# Patient Record
Sex: FEMALE | Race: WHITE | NOT HISPANIC OR LATINO | Employment: FULL TIME | URBAN - METROPOLITAN AREA
[De-identification: names, ages, dates, MRNs, and addresses within clinical notes are randomized per-mention and may not be internally consistent; named-entity substitution may affect disease eponyms.]

---

## 2019-01-09 ENCOUNTER — TELEPHONE (OUTPATIENT)
Dept: INTERNAL MEDICINE | Facility: CLINIC | Age: 39
End: 2019-01-09

## 2019-01-09 ENCOUNTER — CLINICAL SUPPORT (OUTPATIENT)
Dept: INTERNAL MEDICINE | Facility: CLINIC | Age: 39
End: 2019-01-09

## 2019-01-09 ENCOUNTER — OFFICE VISIT (OUTPATIENT)
Dept: INTERNAL MEDICINE | Facility: CLINIC | Age: 39
End: 2019-01-09
Payer: COMMERCIAL

## 2019-01-09 VITALS
HEART RATE: 75 BPM | WEIGHT: 261.44 LBS | SYSTOLIC BLOOD PRESSURE: 122 MMHG | TEMPERATURE: 98 F | HEIGHT: 72 IN | DIASTOLIC BLOOD PRESSURE: 76 MMHG | BODY MASS INDEX: 35.41 KG/M2

## 2019-01-09 DIAGNOSIS — Z00.00 ANNUAL PHYSICAL EXAM: Primary | ICD-10-CM

## 2019-01-09 DIAGNOSIS — E10.9 CONTROLLED DIABETES MELLITUS TYPE 1 WITHOUT COMPLICATIONS: ICD-10-CM

## 2019-01-09 DIAGNOSIS — Z00.00 ROUTINE GENERAL MEDICAL EXAMINATION AT A HEALTH CARE FACILITY: Primary | ICD-10-CM

## 2019-01-09 DIAGNOSIS — E03.8 OTHER SPECIFIED HYPOTHYROIDISM: ICD-10-CM

## 2019-01-09 LAB
ALBUMIN SERPL BCP-MCNC: 3.8 G/DL
ALP SERPL-CCNC: 72 U/L
ALT SERPL W/O P-5'-P-CCNC: 13 U/L
ANION GAP SERPL CALC-SCNC: 9 MMOL/L
AST SERPL-CCNC: 14 U/L
BILIRUB SERPL-MCNC: 0.6 MG/DL
BUN SERPL-MCNC: 11 MG/DL
CALCIUM SERPL-MCNC: 9.6 MG/DL
CHLORIDE SERPL-SCNC: 108 MMOL/L
CHOLEST SERPL-MCNC: 178 MG/DL
CHOLEST/HDLC SERPL: 2.4 {RATIO}
CO2 SERPL-SCNC: 25 MMOL/L
CREAT SERPL-MCNC: 0.8 MG/DL
ERYTHROCYTE [DISTWIDTH] IN BLOOD BY AUTOMATED COUNT: 12.9 %
EST. GFR  (AFRICAN AMERICAN): >60 ML/MIN/1.73 M^2
EST. GFR  (NON AFRICAN AMERICAN): >60 ML/MIN/1.73 M^2
ESTIMATED AVG GLUCOSE: 137 MG/DL
GLUCOSE SERPL-MCNC: 85 MG/DL
HBA1C MFR BLD HPLC: 6.4 %
HCT VFR BLD AUTO: 42 %
HDLC SERPL-MCNC: 75 MG/DL
HDLC SERPL: 42.1 %
HGB BLD-MCNC: 13.2 G/DL
LDLC SERPL CALC-MCNC: 94.2 MG/DL
MCH RBC QN AUTO: 27.8 PG
MCHC RBC AUTO-ENTMCNC: 31.4 G/DL
MCV RBC AUTO: 89 FL
NONHDLC SERPL-MCNC: 103 MG/DL
PLATELET # BLD AUTO: 211 K/UL
PMV BLD AUTO: 12.9 FL
POTASSIUM SERPL-SCNC: 3.8 MMOL/L
PROT SERPL-MCNC: 7.5 G/DL
RBC # BLD AUTO: 4.74 M/UL
SODIUM SERPL-SCNC: 142 MMOL/L
T4 FREE SERPL-MCNC: 1.48 NG/DL
TRIGL SERPL-MCNC: 44 MG/DL
TSH SERPL DL<=0.005 MIU/L-ACNC: 0.39 UIU/ML
WBC # BLD AUTO: 5.55 K/UL

## 2019-01-09 PROCEDURE — 99999 PR PBB SHADOW E&M-NEW PATIENT-LVL III: CPT | Mod: PBBFAC,,, | Performed by: INTERNAL MEDICINE

## 2019-01-09 PROCEDURE — 83036 HEMOGLOBIN GLYCOSYLATED A1C: CPT

## 2019-01-09 PROCEDURE — 80053 COMPREHEN METABOLIC PANEL: CPT

## 2019-01-09 PROCEDURE — 99999 PR PBB SHADOW E&M-NEW PATIENT-LVL III: ICD-10-PCS | Mod: PBBFAC,,, | Performed by: INTERNAL MEDICINE

## 2019-01-09 PROCEDURE — 84439 ASSAY OF FREE THYROXINE: CPT

## 2019-01-09 PROCEDURE — 99385 PREV VISIT NEW AGE 18-39: CPT | Mod: S$GLB,,, | Performed by: INTERNAL MEDICINE

## 2019-01-09 PROCEDURE — 85027 COMPLETE CBC AUTOMATED: CPT

## 2019-01-09 PROCEDURE — 84443 ASSAY THYROID STIM HORMONE: CPT

## 2019-01-09 PROCEDURE — 80061 LIPID PANEL: CPT

## 2019-01-09 PROCEDURE — 99385 PR PREVENTIVE VISIT,NEW,18-39: ICD-10-PCS | Mod: S$GLB,,, | Performed by: INTERNAL MEDICINE

## 2019-01-09 RX ORDER — LEVOTHYROXINE SODIUM 150 UG/1
TABLET ORAL
Qty: 90 TABLET | Refills: 3
Start: 2019-01-09 | End: 2019-01-17

## 2019-01-09 NOTE — PROGRESS NOTES
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 Just finished fellowship for epilepsy in [place]. Will be starting here at [facility].     Type 1 diabetes - dx in . Currently on insulin pump. Well controlled.   Current a1c 6.4.   Eye - 3 times during pregnancy so up to date - in April.   Foot exam - during pcP EXAMS.     Hypothyroidism - synthroid 150mcg daily (reports levothyroxine was taken off the market at a time but has been on brand name since then).   H/o goiter w/ removal in [facility].     Had Pap in August in [place] and normal.     Past Medical History:  Past Medical History:   Diagnosis Date    Diabetes mellitus type I     Hypothyroidism        Past Surgical History:  Past Surgical History:   Procedure Laterality Date     SECTION      x2       Allergies:  Review of patient's allergies indicates:  No Known Allergies    Home Medications:  Prior to Admission medications    Medication Sig Start Date End Date Taking? Authorizing Provider   ACCU-CHEK LONNIE PLUS Strp  13  Yes Historical Provider, MD   NOVOLOG 100 unit/mL injection  13  Yes Historical Provider, MD   SYNTHROID 150 mcg tablet  13  Yes Historical Provider, MD       Family History:  Family History   Problem Relation Age of Onset    No Known Problems Mother     Cancer Father 79        colon CA    No Known Problems Brother     No Known Problems Daughter     No Known Problems Son        Social History:  Social History     Tobacco Use    Smoking status: Never Smoker    Smokeless tobacco: Never Used   Substance Use Topics    Alcohol use: No     Frequency: Never    Drug use: No       Review of Systems:  Review of Systems Comprehensive review of systems otherwise negative. See history/subjective section for more details.    Health Maintainence:   Reviewed.      PHYSICAL EXAM     /76 (BP Location: Left arm, Patient Position: Sitting, BP Method: Large (Manual))   Pulse 75   Temp 97.7 °F (36.5 °C)   Ht 6' (1.829 m)   Wt 118.6 kg (261 lb 7.5 oz)   LMP 10/09/2017   BMI 35.46 kg/m²     GEN - A+OX4, NAD   HEENT - PERRL, EOMI, OP clear. MMM.   Neck - No thyromegaly or cervical LAD. No thyroid masses felt.  CV - RRR, no m/r   Chest - CTAB, no wheezing or rhonchi  Abd - S/NT/ND/+BS. Insulin pump in place.  Ext - 2+BDP and radial pulses. No LE edema.   Neuro - 5/5 BUE and BLE strength. Sensation to light touch intact throughout. 2+ DTRs. Normal gait.   MSK - No spinal tenderness to palpation. Normal gait.   Skin - No rash.    LABS     Previous labs reviewed.    ASSESSMENT/PLAN     Thao Ybarra is a 38 y.o. female with  Thao was seen today for [facility].    Diagnoses and all orders for this visit:    Annual physical exam - pt UTD on her vaccines and HM.    Controlled diabetes mellitus type 1 without complications - cont insulin pump. Will email Dr. Saini to see if we can facilitate endo appt.  -     Ambulatory Referral to Endocrinology    Other specified hypothyroidism  -     TSH; Future; Expected date: 01/09/2019      RTC in 12 months, sooner if needed and depending on labs.    Juana Lizama MD  Department of Internal Medicine - [facility]  10:32 AM

## 2019-01-09 NOTE — LETTER
2019    Thao Ybarra  [address] [address]             [facility] - Internal Medicine  [address]  [facility] [address]  Phone: [phone]  Fax: [phone] Dear Dr. Ybarra:    Thank you for allowing me to serve you and perform your Executive Health exam on 2019.  This letter will serve a brief summary of the history, physical findings, and laboratory/studies performed and recommendations at that time.    Reason for Visit: Executive Health Preventive Physical Examination    Past Medical History:   Diagnosis Date    Diabetes mellitus type I     Hypothyroidism        Past Surgical History:   Procedure Laterality Date     SECTION      x2       Family History   Problem Relation Age of Onset    No Known Problems Mother     Cancer Father 79        colon CA    No Known Problems Brother     No Known Problems Daughter     No Known Problems Son        Social History     Socioeconomic History    Marital status:      Spouse name: Not on file    Number of children: Not on file    Years of education: Not on file    Highest education level: Not on file   Social Needs    Financial resource strain: Not on file    Food insecurity - worry: Not on file    Food insecurity - inability: Not on file    Transportation needs - medical: Not on file    Transportation needs - non-medical: Not on file   Occupational History    Occupation: EPILEPSY MD   Tobacco Use    Smoking status: Never Smoker    Smokeless tobacco: Never Used   Substance and Sexual Activity    Alcohol use: No     Frequency: Never    Drug use: No    Sexual activity: Not on file   Other Topics Concern    Not on file   Social History Narrative    Currently breastfeeding but weaning.         Review of patient's allergies indicates:  No Known Allergies      Current Outpatient Medications:     ACCU-CHEK LONNIE PLUS Strp, , Disp: , Rfl:     NOVOLOG 100 unit/mL injection, , Disp: , Rfl:     SYNTHROID 150  mcg tablet, , Disp: , Rfl:      Review of Systems  Review of Systems - Negative     Physical Exam:  General: General appearance: alert, well appearing, and in no distress.   Skin: Skin exam - normal coloration and turgor, no rashes, no suspicious skin lesions noted.  HEENT: Ears - bilateral TM's and external ear canals normal. , ENT exam reveals - ENT exam normal, no neck nodes or sinus tenderness.   Lungs: Chest: clear to auscultation, no wheezes, rales or rhonchi, symmetric air entry.   Heart: CVS exam: normal rate, regular rhythm, normal S1, S2, no murmurs, rubs, clicks or gallops.   Extremities: Exam of extremities: peripheral pulses normal, no pedal edema, no clubbing or cyanosis    Labs:  Results for orders placed or performed in visit on 01/09/19   Comprehensive metabolic panel   Result Value Ref Range    Sodium 142 136 - 145 mmol/L    Potassium 3.8 3.5 - 5.1 mmol/L    Chloride 108 95 - 110 mmol/L    CO2 25 23 - 29 mmol/L    Glucose 85 70 - 110 mg/dL    BUN, Bld 11 6 - 20 mg/dL    Creatinine 0.8 0.5 - 1.4 mg/dL    Calcium 9.6 8.7 - 10.5 mg/dL    Total Protein 7.5 6.0 - 8.4 g/dL    Albumin 3.8 3.5 - 5.2 g/dL    Total Bilirubin 0.6 0.1 - 1.0 mg/dL    Alkaline Phosphatase 72 55 - 135 U/L    AST 14 10 - 40 U/L    ALT 13 10 - 44 U/L    Anion Gap 9 8 - 16 mmol/L    eGFR if African American >60.0 >60 mL/min/1.73 m^2    eGFR if non African American >60.0 >60 mL/min/1.73 m^2   CBC Without Differential   Result Value Ref Range    WBC 5.55 3.90 - 12.70 K/uL    RBC 4.74 4.00 - 5.40 M/uL    Hemoglobin 13.2 12.0 - 16.0 g/dL    Hematocrit 42.0 37.0 - 48.5 %    MCV 89 82 - 98 fL    MCH 27.8 27.0 - 31.0 pg    MCHC 31.4 (L) 32.0 - 36.0 g/dL    RDW 12.9 11.5 - 14.5 %    Platelets 211 150 - 350 K/uL    MPV 12.9 9.2 - 12.9 fL   Lipid panel   Result Value Ref Range    Cholesterol 178 120 - 199 mg/dL    Triglycerides 44 30 - 150 mg/dL    HDL 75 40 - 75 mg/dL    LDL Cholesterol 94.2 63.0 - 159.0 mg/dL    HDL/Chol Ratio 42.1 20.0 -  50.0 %    Total Cholesterol/HDL Ratio 2.4 2.0 - 5.0    Non-HDL Cholesterol 103 mg/dL   Hemoglobin A1c   Result Value Ref Range    Hemoglobin A1C 6.4 (H) 4.0 - 5.6 %    Estimated Avg Glucose 137 (H) 68 - 131 mg/dL        Assessment/Recommendations:  Routine Health Maintenance    At this time, you appear to be in good medical condition.  Your diabetes is well controlled. I'll let you know if the thyroid test is abnormal.     If you have any questions or concerns, please don't hesitate to call.    Sincerely,        Juana Lizama MD

## 2019-01-09 NOTE — Clinical Note
Dr. Saini,I just saw Dr. Ybarra today and she'll be starting in neurology soon. She just moved from [place] and has DM1 and hypothyroidism. I know if she goes through the regular channels, it'll take a few months to get her in. Is it possible to facilitate an appointment with joseph? Thanks!Juana

## 2019-01-09 NOTE — TELEPHONE ENCOUNTER
Can you call to let Dr. Ybarra know that her TSH is borderline low, which means her synthroid dose may be too high. I recommend taking synthroid 150 daily except cut Sunday's dosage in half. Keep with this dosage until she gets in to see endocrinology.

## 2019-01-17 ENCOUNTER — OFFICE VISIT (OUTPATIENT)
Dept: ENDOCRINOLOGY | Facility: CLINIC | Age: 39
End: 2019-01-17
Payer: COMMERCIAL

## 2019-01-17 VITALS
WEIGHT: 259.81 LBS | HEART RATE: 78 BPM | BODY MASS INDEX: 35.19 KG/M2 | HEIGHT: 72 IN | DIASTOLIC BLOOD PRESSURE: 80 MMHG | SYSTOLIC BLOOD PRESSURE: 122 MMHG

## 2019-01-17 DIAGNOSIS — E03.9 HYPOTHYROIDISM (ACQUIRED): ICD-10-CM

## 2019-01-17 DIAGNOSIS — E10.9 CONTROLLED DIABETES MELLITUS TYPE 1 WITHOUT COMPLICATIONS: Primary | ICD-10-CM

## 2019-01-17 LAB
ALBUMIN/CREAT UR: 4 UG/MG
CREAT UR-MCNC: 200 MG/DL
MICROALBUMIN UR DL<=1MG/L-MCNC: 8 UG/ML

## 2019-01-17 PROCEDURE — 99204 OFFICE O/P NEW MOD 45 MIN: CPT | Mod: S$GLB,,, | Performed by: INTERNAL MEDICINE

## 2019-01-17 PROCEDURE — 99204 PR OFFICE/OUTPT VISIT, NEW, LEVL IV, 45-59 MIN: ICD-10-PCS | Mod: S$GLB,,, | Performed by: INTERNAL MEDICINE

## 2019-01-17 PROCEDURE — 3008F BODY MASS INDEX DOCD: CPT | Mod: CPTII,S$GLB,, | Performed by: INTERNAL MEDICINE

## 2019-01-17 PROCEDURE — 99999 PR PBB SHADOW E&M-EST. PATIENT-LVL III: CPT | Mod: PBBFAC,,, | Performed by: INTERNAL MEDICINE

## 2019-01-17 PROCEDURE — 3044F PR MOST RECENT HEMOGLOBIN A1C LEVEL <7.0%: ICD-10-PCS | Mod: CPTII,S$GLB,, | Performed by: INTERNAL MEDICINE

## 2019-01-17 PROCEDURE — 3044F HG A1C LEVEL LT 7.0%: CPT | Mod: CPTII,S$GLB,, | Performed by: INTERNAL MEDICINE

## 2019-01-17 PROCEDURE — 3008F PR BODY MASS INDEX (BMI) DOCUMENTED: ICD-10-PCS | Mod: CPTII,S$GLB,, | Performed by: INTERNAL MEDICINE

## 2019-01-17 PROCEDURE — 82043 UR ALBUMIN QUANTITATIVE: CPT

## 2019-01-17 PROCEDURE — 99999 PR PBB SHADOW E&M-EST. PATIENT-LVL III: ICD-10-PCS | Mod: PBBFAC,,, | Performed by: INTERNAL MEDICINE

## 2019-01-17 RX ORDER — INSULIN ASPART 100 [IU]/ML
INJECTION, SOLUTION INTRAVENOUS; SUBCUTANEOUS
Qty: 30 ML | Refills: 0
Start: 2019-01-17 | End: 2020-01-13 | Stop reason: SDUPTHER

## 2019-01-17 RX ORDER — LEVOTHYROXINE SODIUM 137 UG/1
TABLET ORAL
Qty: 86 TABLET | Refills: 3 | Status: SHIPPED | OUTPATIENT
Start: 2019-01-17 | End: 2019-10-09 | Stop reason: SDUPTHER

## 2019-01-17 NOTE — PROGRESS NOTES
Subjective:      Patient ID: Thao Ybarra is a 38 y.o. female.    Chief Complaint:  Diabetes      History of Present Illness  Dr. 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     Has active history of type 1 DM and hypothyroidism.     Type 1 diabetes first diagnosed in 2001. Has been on an insulin pump since 2005.     No family history of diabetes.     Diabetes Complications:  Denies neuropathy  No retinopathy - last eye exam 4/2017  No recent urine micro on file    Current Diabetes Regimen:   Medtronic 670G:  Basal settings:  0:00-06:00 0.8 u/hr  06:00-08:00 1.15 u/hr  08:00-16:30 0.975 u/hr  16:30-MN 0.8 u/hr    Carb ratio 1:8   ISF 1:50    Does not use bolus wizard, more comfortable with manual boluses. Does not use Auto-mode.     Changes site every 3-4 days    Reports full compliance with diabetes regimen.    Has Dexcom G5.  Reviewed Dexcom report:  Avg glucose 140  Time in range 77%  Hypoglycemia 0.4%    Denies any hypoglycemia.    Eats 3 regular meals daily, eats 2 snacks between meals (KIND bars, cheese).    Has had hypothyroidism since 1994. Mother with hypothyroidism.   No overt fatigue. Wt stable. Regular bowel movements. No cold intolerance.   Recent TSH suppressed so dose decreased to 137 mcg Mon-Sat with half tab on Sun only.       Review of Systems   Constitutional: Negative for unexpected weight change.   HENT: Negative for voice change.    Eyes: Negative for visual disturbance.   Respiratory: Negative for shortness of breath.    Cardiovascular: Negative for chest pain.   Gastrointestinal: Negative for abdominal pain.   Endocrine: Negative for cold intolerance.   Genitourinary: Negative for frequency.   Musculoskeletal: Negative for myalgias.   Skin: Negative for rash.       Objective:   Physical Exam   Constitutional: She is oriented to person, place, and time. She appears well-developed and well-nourished.   HENT:   Head: Normocephalic and atraumatic.   Right Ear: External ear normal.   Left  Ear: External ear normal.   Nose: Nose normal.   Neck: No tracheal deviation present. No thyromegaly present.   Cardiovascular: Normal rate, regular rhythm and normal heart sounds.   No edema   Pulmonary/Chest: Effort normal and breath sounds normal.   Abdominal: Soft. Bowel sounds are normal. There is no tenderness.   Musculoskeletal:   Normal gait, no cyanosis or clubbing   Neurological: She is alert and oriented to person, place, and time. She has normal reflexes.   Vibration sense intact   Skin: Skin is warm and dry. No rash noted.   No nodules, no ulcers   Psychiatric: She has a normal mood and affect. Judgment normal.   Vitals reviewed.  Feet no cuts or  scratches  Shoes appropriate  sensation intact to vibration and monofilament    Lab Review:   Results for BEVERLY ALVARADO (MRN [id number]) as of 1/17/2019 07:20   Ref. Range 1/9/2019 09:29   Sodium Latest Ref Range: 136 - 145 mmol/L 142   Potassium Latest Ref Range: 3.5 - 5.1 mmol/L 3.8   Chloride Latest Ref Range: 95 - 110 mmol/L 108   CO2 Latest Ref Range: 23 - 29 mmol/L 25   Anion Gap Latest Ref Range: 8 - 16 mmol/L 9   BUN, Bld Latest Ref Range: 6 - 20 mg/dL 11   Creatinine Latest Ref Range: 0.5 - 1.4 mg/dL 0.8   eGFR if non African American Latest Ref Range: >60 mL/min/1.73 m^2 >60.0   eGFR if African American Latest Ref Range: >60 mL/min/1.73 m^2 >60.0   Glucose Latest Ref Range: 70 - 110 mg/dL 85   Calcium Latest Ref Range: 8.7 - 10.5 mg/dL 9.6   Alkaline Phosphatase Latest Ref Range: 55 - 135 U/L 72   Total Protein Latest Ref Range: 6.0 - 8.4 g/dL 7.5   Albumin Latest Ref Range: 3.5 - 5.2 g/dL 3.8   Total Bilirubin Latest Ref Range: 0.1 - 1.0 mg/dL 0.6   AST Latest Ref Range: 10 - 40 U/L 14   ALT Latest Ref Range: 10 - 44 U/L 13   Triglycerides Latest Ref Range: 30 - 150 mg/dL 44   Cholesterol Latest Ref Range: 120 - 199 mg/dL 178   HDL Latest Ref Range: 40 - 75 mg/dL 75   HDL/Chol Ratio Latest Ref Range: 20.0 - 50.0 % 42.1   LDL Cholesterol Latest Ref  Range: 63.0 - 159.0 mg/dL 94.2   Non-HDL Cholesterol Latest Units: mg/dL 103   Total Cholesterol/HDL Ratio Latest Ref Range: 2.0 - 5.0  2.4   Hemoglobin A1C External Latest Ref Range: 4.0 - 5.6 % 6.4 (H)   Estimated Avg Glucose Latest Ref Range: 68 - 131 mg/dL 137 (H)   TSH Latest Ref Range: 0.400 - 4.000 uIU/mL 0.389 (L)   Free T4 Latest Ref Range: 0.71 - 1.51 ng/dL 1.48       Assessment:     1. Controlled diabetes mellitus type 1 without complications    2. Hypothyroidism (acquired)      Plan:     --Patient with well-controlled type 1 diabetes  --A1c goal <7.0  --Last A1c at goal  --Able to maintain reasonably tight control without frequent or severe hypoglycemia  --UTD with eye exam  --Urine micro today  --Will continue current pump settings  --Due to upgrade to Dexcom G6 soon  --She is interested in being seen in pump clinic going forward  --Last TSH slightly suppressed and thyroid hormone dose slightly decreased  --Continue levothyroxine 137 mcg Mon-Sat with a half tab on Sun only    Chu Saini M.D. Staff Endocrinology

## 2019-01-31 ENCOUNTER — TELEPHONE (OUTPATIENT)
Dept: ENDOCRINOLOGY | Facility: CLINIC | Age: 39
End: 2019-01-31

## 2019-02-01 ENCOUNTER — PATIENT OUTREACH (OUTPATIENT)
Dept: DIABETES | Facility: CLINIC | Age: 39
End: 2019-02-01

## 2019-02-01 NOTE — PROGRESS NOTES
MARY sent the CMN back because the supporting clinical indications were not checked off.  Faxed CMN back to MARY with the checks and Dr. Saini's intials and todays date.

## 2019-02-05 ENCOUNTER — TELEPHONE (OUTPATIENT)
Dept: ENDOCRINOLOGY | Facility: CLINIC | Age: 39
End: 2019-02-05

## 2019-09-18 ENCOUNTER — OFFICE VISIT (OUTPATIENT)
Dept: ENDOCRINOLOGY | Facility: CLINIC | Age: 39
End: 2019-09-18
Payer: COMMERCIAL

## 2019-09-18 ENCOUNTER — PATIENT OUTREACH (OUTPATIENT)
Dept: DIABETES | Facility: CLINIC | Age: 39
End: 2019-09-18

## 2019-09-18 VITALS
DIASTOLIC BLOOD PRESSURE: 74 MMHG | BODY MASS INDEX: 36.25 KG/M2 | WEIGHT: 267.63 LBS | HEIGHT: 72 IN | HEART RATE: 78 BPM | SYSTOLIC BLOOD PRESSURE: 118 MMHG

## 2019-09-18 DIAGNOSIS — E10.9 CONTROLLED DIABETES MELLITUS TYPE 1 WITHOUT COMPLICATIONS: Primary | ICD-10-CM

## 2019-09-18 DIAGNOSIS — E03.9 HYPOTHYROIDISM (ACQUIRED): ICD-10-CM

## 2019-09-18 DIAGNOSIS — Z96.41 INSULIN PUMP STATUS: ICD-10-CM

## 2019-09-18 PROCEDURE — 95251 CONT GLUC MNTR ANALYSIS I&R: CPT | Mod: S$GLB,,, | Performed by: STUDENT IN AN ORGANIZED HEALTH CARE EDUCATION/TRAINING PROGRAM

## 2019-09-18 PROCEDURE — 99214 OFFICE O/P EST MOD 30 MIN: CPT | Mod: 25,S$GLB,, | Performed by: INTERNAL MEDICINE

## 2019-09-18 PROCEDURE — 99214 PR OFFICE/OUTPT VISIT, EST, LEVL IV, 30-39 MIN: ICD-10-PCS | Mod: 25,S$GLB,, | Performed by: INTERNAL MEDICINE

## 2019-09-18 PROCEDURE — 99999 PR PBB SHADOW E&M-EST. PATIENT-LVL IV: ICD-10-PCS | Mod: PBBFAC,,,

## 2019-09-18 PROCEDURE — 3008F BODY MASS INDEX DOCD: CPT | Mod: CPTII,S$GLB,, | Performed by: INTERNAL MEDICINE

## 2019-09-18 PROCEDURE — 3008F PR BODY MASS INDEX (BMI) DOCUMENTED: ICD-10-PCS | Mod: CPTII,S$GLB,, | Performed by: INTERNAL MEDICINE

## 2019-09-18 PROCEDURE — 3044F HG A1C LEVEL LT 7.0%: CPT | Mod: CPTII,S$GLB,, | Performed by: INTERNAL MEDICINE

## 2019-09-18 PROCEDURE — 99999 PR PBB SHADOW E&M-EST. PATIENT-LVL IV: CPT | Mod: PBBFAC,,,

## 2019-09-18 PROCEDURE — 3044F PR MOST RECENT HEMOGLOBIN A1C LEVEL <7.0%: ICD-10-PCS | Mod: CPTII,S$GLB,, | Performed by: INTERNAL MEDICINE

## 2019-09-18 PROCEDURE — 95251 PR GLUCOSE MONITOR, 72 HOUR, PHYS INTERP: ICD-10-PCS | Mod: S$GLB,,, | Performed by: STUDENT IN AN ORGANIZED HEALTH CARE EDUCATION/TRAINING PROGRAM

## 2019-09-18 RX ORDER — INSULIN ASPART 100 [IU]/ML
INJECTION, SOLUTION INTRAVENOUS; SUBCUTANEOUS
Qty: 3 ML | Refills: 1 | Status: SHIPPED | OUTPATIENT
Start: 2019-09-18 | End: 2021-04-21

## 2019-09-18 RX ORDER — INSULIN GLARGINE 100 [IU]/ML
32 INJECTION, SOLUTION SUBCUTANEOUS DAILY
Qty: 3 ML | Refills: 1 | Status: SHIPPED | OUTPATIENT
Start: 2019-09-18 | End: 2022-05-23

## 2019-09-18 NOTE — PROGRESS NOTES
Pump Clinic Return Visit    Chief Complaint   Patient presents with    Diabetes        HPI:Thao Ybarra is a 39 y.o. female with a past medical history of type 1 diabetes mellitus on an insulin pump. She is here for a follow up after seeing Dr. Saini earlier this year. 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 She does not use the bolus wizard and does the calculations herself and she also feels comfortable doing this. She reports she boluses more than 3 times a day because she eats small portions 5-6 times a day. She reports she keeps into account insulin on board time. Current pump settings and CGM report as described below.    Current diabetes regimen:  Pump: Medtronic 670 G    Pump Settings  Basal Rate  12:00AM:     1.0 U/h  5:30AM:       1.35 U/h  9:30AM:       1.20 U/h  4:30PM:       1.00 U/h    Carb Ratio  12A: 1:8    ISF  12A:1:50    Target: 100  IAT: 4h    TDD 52U  Basal 52%; Bolus 48%  Sensor wear 93% of the time  GMI: 6.5%  [email or web address]    Lab Results   Component Value Date    HGBA1C 6.4 (H) 01/09/2019       Glucose Monitoring:  Meter/CGM: DexCom G6  Sensor and pump report downloaded and reviewed, see report in media tab    Pt is monitoring blood glucose readings 4 times a day.  Needs >100 strips per month related to fluctuations with blood glucose reading, A1c trends, and activity level.    Hypoglycemic Episodes:  No    Since last visit no severe hypoglycemic episodes requiring observer intervention: No    DKA: No    Screening / DM Complications:  Last Foot Exam: Within 1 year  Neuropathy: No  Last eye exam:  April 2018, no laser surgery or DR  CVD/MI: No    Lab Results   Component Value Date    TSH 0.389 (L) 01/09/2019     No results found for: TTGIGA    Review of Systems   Constitutional: Negative for unexpected weight change.   Eyes: Negative for visual disturbance.   Respiratory: Negative for shortness of breath.    Cardiovascular: Negative for chest  "pain.   Gastrointestinal: Negative for abdominal pain.   Musculoskeletal: Negative for arthralgias.   Skin: Negative for wound.   Allergic/Immunologic: Negative for food allergies.   Neurological: Negative for headaches.   Hematological: Does not bruise/bleed easily.       Vital Signs  /74 (BP Location: Left arm, Patient Position: Sitting, BP Method: Medium (Automatic))   Pulse 78   Ht 6' 1" (1.854 m)   Wt 121.4 kg (267 lb 10.2 oz)   LMP 09/15/2019   BMI 35.31 kg/m²     Physical Exam   Constitutional: She is oriented to person, place, and time. She appears well-developed and well-nourished.   HENT:   Head: Normocephalic and atraumatic.   Neck: Neck supple. No thyromegaly present.   Cardiovascular: Normal rate, regular rhythm and normal heart sounds.   Pulmonary/Chest: Effort normal. No respiratory distress.   Abdominal: Soft. There is no tenderness.   Neurological: She is alert and oriented to person, place, and time.   Skin: Skin is warm. No rash noted.   Psychiatric: She has a normal mood and affect. Her behavior is normal.         Diabetes Foot Exam:   Protective Sensation (w/ 10 gram monofilament):  Right: Intact  Left: Intact    Visual Inspection:  Normal -  Bilateral    Pedal Pulses:   Right: Present  Left: Present    Posterior tibialis:   Right:Present  Left: Present          Chemistry        Component Value Date/Time     01/09/2019 0929    K 3.8 01/09/2019 0929     01/09/2019 0929    CO2 25 01/09/2019 0929    BUN 11 01/09/2019 0929    CREATININE 0.8 01/09/2019 0929    GLU 85 01/09/2019 0929        Component Value Date/Time    CALCIUM 9.6 01/09/2019 0929    ALKPHOS 72 01/09/2019 0929    AST 14 01/09/2019 0929    ALT 13 01/09/2019 0929    BILITOT 0.6 01/09/2019 0929    ESTGFRAFRICA >60.0 01/09/2019 0929    EGFRNONAA >60.0 01/09/2019 0929           Lab Results   Component Value Date    CHOL 178 01/09/2019     Lab Results   Component Value Date    HDL 75 01/09/2019     Lab Results "   Component Value Date    LDLCALC 94.2 01/09/2019     Lab Results   Component Value Date    TRIG 44 01/09/2019     Lab Results   Component Value Date    CHOLHDL 42.1 01/09/2019           Assessment/Plan  Controlled diabetes mellitus type 1 without complications  Very well controlled with a GMI of 6.5% with BG at goal 71% of the time. She wants to continue same settings which is reasonable given adequate control. She wants to continue to calculate boluses herself. She was instructed to keep in mind insulin on board when using corrections.     Plan:  - Continue Medtronic 670 G with following settings:    Basal Rate  12:00AM:     1.0 U/h  5:30AM:       1.35 U/h  9:30AM:       1.20 U/h  4:30PM:       1.00 U/h     Carb Ratio  12A: 1:8     ISF  12A:1:50     - Will check A1c today  - Will send referral to Ophthalmology for annual eye exam  - RTC in 6 months    Hypothyroidism (acquired)  Levothyroxine was adjusted earlier in the year to 137 mcg daily Mon-Sat and half a tablet on Sundays. Will check thyroid function today and adjust as needed.    Plan:  - Check TSH today, adjust levothyroxine as needed        Glucagon kit: no, will Rx    FOLLOW UP  Follow up in about 6 months (around 3/18/2020).       Pump backup plan  If the insulin pump is non functional and discontinued for anticipated more than 20 hours, please give daily injections of:  Long acting insulin 32 units daily  Short acting insulin for meals according to carb ratios and sensitivity factor in the pump.    When the insulin pump is restarted, do not restart basal rates until at least 22 hours after the last long acting insulin injection. You can set a 0% temporary basal setting that will last until this time and use your pump to bolus for meals and correction.    For any technical insulin pump issues, please contact the insulin pump company; the toll free number is printed on the label on the back of the insulin pump.

## 2019-09-18 NOTE — ASSESSMENT & PLAN NOTE
Very well controlled with a GMI of 6.5% with BG at goal 71% of the time. She wants to continue same settings which is reasonable given adequate control. She wants to continue to calculate boluses herself. She was instructed to keep in mind insulin on board when using corrections.     Plan:  - Continue Medtronic 670 G with following settings:    Basal Rate  12:00AM:     1.0 U/h  5:30AM:       1.35 U/h  9:30AM:       1.20 U/h  4:30PM:       1.00 U/h     Carb Ratio  12A: 1:8     ISF  12A:1:50     - Will check A1c today  - Will send referral to Ophthalmology for annual eye exam  - RTC in 6 months

## 2019-09-18 NOTE — ASSESSMENT & PLAN NOTE
Levothyroxine was adjusted earlier in the year to 137 mcg daily Mon-Sat and half a tablet on Sundays. Will check thyroid function today and adjust as needed.    Plan:  - Check TSH today, adjust levothyroxine as needed

## 2019-09-19 ENCOUNTER — PATIENT MESSAGE (OUTPATIENT)
Dept: ENDOCRINOLOGY | Facility: CLINIC | Age: 39
End: 2019-09-19

## 2019-09-19 ENCOUNTER — LAB VISIT (OUTPATIENT)
Dept: LAB | Facility: HOSPITAL | Age: 39
End: 2019-09-19
Payer: COMMERCIAL

## 2019-09-19 DIAGNOSIS — E03.9 HYPOTHYROIDISM (ACQUIRED): ICD-10-CM

## 2019-09-19 LAB
ESTIMATED AVG GLUCOSE: 120 MG/DL (ref 68–131)
HBA1C MFR BLD HPLC: 5.8 % (ref 4–5.6)
TSH SERPL DL<=0.005 MIU/L-ACNC: 2.07 UIU/ML (ref 0.4–4)

## 2019-09-19 PROCEDURE — 83036 HEMOGLOBIN GLYCOSYLATED A1C: CPT

## 2019-09-19 PROCEDURE — 84443 ASSAY THYROID STIM HORMONE: CPT

## 2019-09-19 PROCEDURE — 36415 COLL VENOUS BLD VENIPUNCTURE: CPT

## 2019-09-22 NOTE — PROGRESS NOTES
I have reviewed and concur with Dr. Oh's history, physical, assessment, and plan.  I have personally interviewed and examined the patient.    T1DM with A1c at goal. Prefers to not use bolus wizard but very appropriately dosing insuln based on CR, ISF and takes into account insulin on board so reasonable to continue as she is doing  Repeat A1c today  Check TSH with titration of LT4 pending result    Caridad Aponte MD

## 2019-10-09 DIAGNOSIS — E10.9 CONTROLLED DIABETES MELLITUS TYPE 1 WITHOUT COMPLICATIONS: Primary | ICD-10-CM

## 2019-10-09 DIAGNOSIS — E03.9 HYPOTHYROIDISM (ACQUIRED): ICD-10-CM

## 2019-10-09 RX ORDER — LEVOTHYROXINE SODIUM 137 UG/1
TABLET ORAL
Qty: 86 TABLET | Refills: 3 | Status: SHIPPED | OUTPATIENT
Start: 2019-10-09 | End: 2020-04-13 | Stop reason: SDUPTHER

## 2019-10-14 NOTE — PROGRESS NOTES
Medtronic download reviewed with Arnulfo Martinez; general recommendations discussed with MDs and pt.

## 2019-11-15 DIAGNOSIS — E11.9 TYPE 2 DIABETES MELLITUS WITHOUT COMPLICATION, UNSPECIFIED WHETHER LONG TERM INSULIN USE: ICD-10-CM

## 2019-11-25 ENCOUNTER — OFFICE VISIT (OUTPATIENT)
Dept: PRIMARY CARE CLINIC | Facility: CLINIC | Age: 39
End: 2019-11-25
Payer: COMMERCIAL

## 2019-11-25 ENCOUNTER — LAB VISIT (OUTPATIENT)
Dept: LAB | Facility: HOSPITAL | Age: 39
End: 2019-11-25
Attending: FAMILY MEDICINE
Payer: COMMERCIAL

## 2019-11-25 VITALS
SYSTOLIC BLOOD PRESSURE: 110 MMHG | OXYGEN SATURATION: 99 % | HEART RATE: 73 BPM | DIASTOLIC BLOOD PRESSURE: 62 MMHG | HEIGHT: 72 IN | TEMPERATURE: 98 F | BODY MASS INDEX: 36.07 KG/M2 | WEIGHT: 266.31 LBS

## 2019-11-25 DIAGNOSIS — Z00.00 ROUTINE MEDICAL EXAM: ICD-10-CM

## 2019-11-25 DIAGNOSIS — F43.22 ADJUSTMENT REACTION WITH ANXIETY: ICD-10-CM

## 2019-11-25 DIAGNOSIS — Z12.83 SKIN CANCER SCREENING: ICD-10-CM

## 2019-11-25 DIAGNOSIS — E10.9 CONTROLLED DIABETES MELLITUS TYPE 1 WITHOUT COMPLICATIONS: ICD-10-CM

## 2019-11-25 DIAGNOSIS — Z00.00 ROUTINE MEDICAL EXAM: Primary | ICD-10-CM

## 2019-11-25 DIAGNOSIS — E03.9 HYPOTHYROIDISM (ACQUIRED): ICD-10-CM

## 2019-11-25 LAB
25(OH)D3+25(OH)D2 SERPL-MCNC: 27 NG/ML (ref 30–96)
ALBUMIN SERPL BCP-MCNC: 3.9 G/DL (ref 3.5–5.2)
ALP SERPL-CCNC: 56 U/L (ref 55–135)
ALT SERPL W/O P-5'-P-CCNC: 12 U/L (ref 10–44)
ANION GAP SERPL CALC-SCNC: 5 MMOL/L (ref 8–16)
AST SERPL-CCNC: 15 U/L (ref 10–40)
BASOPHILS # BLD AUTO: 0.03 K/UL (ref 0–0.2)
BASOPHILS NFR BLD: 0.7 % (ref 0–1.9)
BILIRUB SERPL-MCNC: 0.5 MG/DL (ref 0.1–1)
BUN SERPL-MCNC: 14 MG/DL (ref 6–20)
CALCIUM SERPL-MCNC: 9.1 MG/DL (ref 8.7–10.5)
CHLORIDE SERPL-SCNC: 108 MMOL/L (ref 95–110)
CHOLEST SERPL-MCNC: 167 MG/DL (ref 120–199)
CHOLEST/HDLC SERPL: 2.5 {RATIO} (ref 2–5)
CO2 SERPL-SCNC: 27 MMOL/L (ref 23–29)
CREAT SERPL-MCNC: 0.8 MG/DL (ref 0.5–1.4)
DIFFERENTIAL METHOD: ABNORMAL
EOSINOPHIL # BLD AUTO: 0.1 K/UL (ref 0–0.5)
EOSINOPHIL NFR BLD: 2.2 % (ref 0–8)
ERYTHROCYTE [DISTWIDTH] IN BLOOD BY AUTOMATED COUNT: 14.5 % (ref 11.5–14.5)
EST. GFR  (AFRICAN AMERICAN): >60 ML/MIN/1.73 M^2
EST. GFR  (NON AFRICAN AMERICAN): >60 ML/MIN/1.73 M^2
GLUCOSE SERPL-MCNC: 71 MG/DL (ref 70–110)
HCT VFR BLD AUTO: 40.3 % (ref 37–48.5)
HDLC SERPL-MCNC: 67 MG/DL (ref 40–75)
HDLC SERPL: 40.1 % (ref 20–50)
HGB BLD-MCNC: 11.4 G/DL (ref 12–16)
IMM GRANULOCYTES # BLD AUTO: 0.01 K/UL (ref 0–0.04)
IMM GRANULOCYTES NFR BLD AUTO: 0.2 % (ref 0–0.5)
LDLC SERPL CALC-MCNC: 92.8 MG/DL (ref 63–159)
LYMPHOCYTES # BLD AUTO: 1.4 K/UL (ref 1–4.8)
LYMPHOCYTES NFR BLD: 35.3 % (ref 18–48)
MCH RBC QN AUTO: 25.1 PG (ref 27–31)
MCHC RBC AUTO-ENTMCNC: 28.3 G/DL (ref 32–36)
MCV RBC AUTO: 89 FL (ref 82–98)
MONOCYTES # BLD AUTO: 0.4 K/UL (ref 0.3–1)
MONOCYTES NFR BLD: 9.1 % (ref 4–15)
NEUTROPHILS # BLD AUTO: 2.1 K/UL (ref 1.8–7.7)
NEUTROPHILS NFR BLD: 52.5 % (ref 38–73)
NONHDLC SERPL-MCNC: 100 MG/DL
NRBC BLD-RTO: 0 /100 WBC
PLATELET # BLD AUTO: 177 K/UL (ref 150–350)
PMV BLD AUTO: ABNORMAL FL (ref 9.2–12.9)
POTASSIUM SERPL-SCNC: 4.1 MMOL/L (ref 3.5–5.1)
PROT SERPL-MCNC: 7.1 G/DL (ref 6–8.4)
RBC # BLD AUTO: 4.54 M/UL (ref 4–5.4)
SODIUM SERPL-SCNC: 140 MMOL/L (ref 136–145)
TRIGL SERPL-MCNC: 36 MG/DL (ref 30–150)
VIT B12 SERPL-MCNC: 697 PG/ML (ref 210–950)
WBC # BLD AUTO: 4.05 K/UL (ref 3.9–12.7)

## 2019-11-25 PROCEDURE — 99395 PR PREVENTIVE VISIT,EST,18-39: ICD-10-PCS | Mod: S$GLB,,, | Performed by: FAMILY MEDICINE

## 2019-11-25 PROCEDURE — 80061 LIPID PANEL: CPT

## 2019-11-25 PROCEDURE — 3044F HG A1C LEVEL LT 7.0%: CPT | Mod: CPTII,S$GLB,, | Performed by: FAMILY MEDICINE

## 2019-11-25 PROCEDURE — 82306 VITAMIN D 25 HYDROXY: CPT

## 2019-11-25 PROCEDURE — 85025 COMPLETE CBC W/AUTO DIFF WBC: CPT

## 2019-11-25 PROCEDURE — 99395 PREV VISIT EST AGE 18-39: CPT | Mod: S$GLB,,, | Performed by: FAMILY MEDICINE

## 2019-11-25 PROCEDURE — 3044F PR MOST RECENT HEMOGLOBIN A1C LEVEL <7.0%: ICD-10-PCS | Mod: CPTII,S$GLB,, | Performed by: FAMILY MEDICINE

## 2019-11-25 PROCEDURE — 82607 VITAMIN B-12: CPT

## 2019-11-25 PROCEDURE — 99999 PR PBB SHADOW E&M-EST. PATIENT-LVL III: CPT | Mod: PBBFAC,,, | Performed by: FAMILY MEDICINE

## 2019-11-25 PROCEDURE — 80053 COMPREHEN METABOLIC PANEL: CPT

## 2019-11-25 PROCEDURE — 99999 PR PBB SHADOW E&M-EST. PATIENT-LVL III: ICD-10-PCS | Mod: PBBFAC,,, | Performed by: FAMILY MEDICINE

## 2019-11-25 PROCEDURE — 36415 COLL VENOUS BLD VENIPUNCTURE: CPT | Mod: PN

## 2019-11-25 RX ORDER — SERTRALINE HYDROCHLORIDE 50 MG/1
50 TABLET, FILM COATED ORAL DAILY
Qty: 90 TABLET | Refills: 3 | Status: SHIPPED | OUTPATIENT
Start: 2019-11-25 | End: 2020-11-23 | Stop reason: SDUPTHER

## 2019-11-25 NOTE — PROGRESS NOTES
Subjective:   Patient ID: Thao Ybarra is a 39 y.o. female.    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 Pt has type 1 diabetes mellitus, sees endocrinology and is on insulin pump and doing very well. Last HgA1c 2 mo ago was 5.8%.    Patient queried and denies any further complaints.      ALLERGIES AND MEDICATIONS: updated and reviewed.  Review of patient's allergies indicates:  No Known Allergies    Current Outpatient Medications:     ACCU-CHEK LONNIE PLUS Strp, , Disp: , Rfl:     blood-glucose meter (CONTOUR LINK MISC), by Misc.(Non-Drug; Combo Route) route., Disp: , Rfl:     glucagon, human recombinant, (GLUCAGON EMERGENCY KIT, HUMAN,) 1 mg SolR, Inject 1 mg into the muscle as needed., Disp: 1 each, Rfl: 1    insulin (LANTUS SOLOSTAR U-100 INSULIN) glargine 100 units/mL (3mL) SubQ pen, Inject 32 Units into the skin once daily. In case insulin pump fails, Disp: 3 mL, Rfl: 1    insulin aspart U-100 (NOVOLOG FLEXPEN U-100 INSULIN) 100 unit/mL (3 mL) InPn pen, Use based on current insulin to carb ratio only in case insulin pump fails. Max daily dose: 50 units, Disp: 3 mL, Rfl: 1    insulin aspart U-100 (NOVOLOG U-100 INSULIN ASPART) 100 unit/mL injection, For use in insulin pump, max  units per day, Disp: 30 mL, Rfl: 0    SYNTHROID 137 mcg Tab tablet, Take 1 tablet by mouth Mon-Sat and a half tablet on Sunday only, Disp: 86 tablet, Rfl: 3    sertraline (ZOLOFT) 50 MG tablet, Take 1 tablet (50 mg total) by mouth once daily., Disp: 90 tablet, Rfl: 3    Review of Systems   Constitutional: Negative for activity change and unexpected weight change.   HENT: Negative for hearing loss, rhinorrhea and trouble swallowing.    Eyes: Negative for discharge and visual disturbance.   Respiratory: Negative for chest tightness and wheezing.    Cardiovascular: Negative for chest pain and palpitations.    Gastrointestinal: Negative for blood in stool, constipation, diarrhea and vomiting.   Endocrine: Negative for polydipsia and polyuria.   Genitourinary: Negative for difficulty urinating, dysuria, hematuria and menstrual problem.   Musculoskeletal: Negative for arthralgias, joint swelling and neck pain.   Neurological: Negative for weakness and headaches.   Psychiatric/Behavioral: Negative for confusion and dysphoric mood.       Objective:     Vitals:    11/25/19 0753   BP: 110/62   Pulse: 73   Temp: 97.8 °F (36.6 °C)   TempSrc: Oral   SpO2: 99%   Weight: 120.8 kg (266 lb 5.1 oz)   Height: 6' (1.829 m)   PainSc: 0-No pain     Body mass index is 36.12 kg/m².    Physical Exam   Constitutional: She appears well-developed and well-nourished.   HENT:   Head: Normocephalic and atraumatic.   Right Ear: External ear normal.   Left Ear: External ear normal.   Mouth/Throat: Oropharynx is clear and moist. No oropharyngeal exudate.   Eyes: Conjunctivae are normal. Right eye exhibits no discharge. Left eye exhibits no discharge.   Neck: No JVD present. No thyromegaly present.   Cardiovascular: Normal rate, regular rhythm and normal heart sounds.   No murmur heard.  Pulmonary/Chest: Effort normal and breath sounds normal. No respiratory distress. She has no wheezes. She has no rales.   Abdominal: Soft. Bowel sounds are normal. She exhibits no distension. There is no tenderness.   Lymphadenopathy:     She has no cervical adenopathy.   Neurological: She is alert.   Skin: Skin is warm and dry.   Psychiatric: She has a normal mood and affect. Her behavior is normal.   Nursing note and vitals reviewed.      Assessment and Plan:   Thao was seen today for annual exam.    Diagnoses and all orders for this visit:    Routine medical exam  -     CBC auto differential; Future  -     Comprehensive metabolic panel; Future  -     Lipid panel; Future  -     Vitamin D; Future  -     Vitamin B12; Future    Skin cancer screening  -      Ambulatory referral to Dermatology    Controlled diabetes mellitus type 1 without complications    Hypothyroidism (acquired)    Adjustment reaction with anxiety    Other orders  -     sertraline (ZOLOFT) 50 MG tablet; Take 1 tablet (50 mg total) by mouth once daily.        Follow up in about 6 months (around 5/25/2020).    THIS NOTE WILL BE SHARED WITH THE PATIENT.

## 2019-12-30 ENCOUNTER — OFFICE VISIT (OUTPATIENT)
Dept: OPTOMETRY | Facility: CLINIC | Age: 39
End: 2019-12-30
Payer: COMMERCIAL

## 2019-12-30 DIAGNOSIS — H52.13 MYOPIA WITH ASTIGMATISM, BILATERAL: ICD-10-CM

## 2019-12-30 DIAGNOSIS — Z01.00 ROUTINE EYE EXAM: Primary | ICD-10-CM

## 2019-12-30 DIAGNOSIS — E10.9 TYPE 1 DIABETES MELLITUS WITHOUT RETINOPATHY: ICD-10-CM

## 2019-12-30 DIAGNOSIS — H52.203 MYOPIA WITH ASTIGMATISM, BILATERAL: ICD-10-CM

## 2019-12-30 PROCEDURE — 99999 PR PBB SHADOW E&M-EST. PATIENT-LVL II: ICD-10-PCS | Mod: PBBFAC,,, | Performed by: OPTOMETRIST

## 2019-12-30 PROCEDURE — 92004 COMPRE OPH EXAM NEW PT 1/>: CPT | Mod: S$GLB,,, | Performed by: OPTOMETRIST

## 2019-12-30 PROCEDURE — 92004 PR EYE EXAM, NEW PATIENT,COMPREHESV: ICD-10-PCS | Mod: S$GLB,,, | Performed by: OPTOMETRIST

## 2019-12-30 PROCEDURE — 92015 DETERMINE REFRACTIVE STATE: CPT | Mod: S$GLB,,, | Performed by: OPTOMETRIST

## 2019-12-30 PROCEDURE — 99999 PR PBB SHADOW E&M-EST. PATIENT-LVL II: CPT | Mod: PBBFAC,,, | Performed by: OPTOMETRIST

## 2019-12-30 PROCEDURE — 92015 PR REFRACTION: ICD-10-PCS | Mod: S$GLB,,, | Performed by: OPTOMETRIST

## 2019-12-30 NOTE — LETTER
December 31, 2019      Tyron Spencer MD  [address]  [facility] [address]           [facility]  [address]  [facility] [address]  Phone: [phone]  Fax: [phone]          Patient: Thao Ybarra   MR Number: [id number]   YOB: 1980   Date of Visit: 12/30/2019       Dear Dr. Tyron Spencer:    Thank you for referring Thao Ybarra to me for evaluation. Attached you will find relevant portions of my assessment and plan of care.    If you have questions, please do not hesitate to call me. I look forward to following Thao Ybarra along with you.    Sincerely,    Loulou Milan, OD    Enclosure  CC:  No Recipients    If you would like to receive this communication electronically, please contact [email or web address] or [phone] to request more information on [employer] Link access.    For providers and/or their staff who would like to refer a patient to [facility], please contact us through our one-stop-shop provider referral line, [facility], at [phone].    If you feel you have received this communication in error or would no longer like to receive these types of communications, please e-mail [email or web address]

## 2019-12-30 NOTE — PROGRESS NOTES
HPI     Annual Exam      Additional comments: New Patient               Comments     MAURY: April 2018 in [place]     Patient here for her yearly eye exam, she had moved away to [place] for a   while and she is now back in [place] and is reestablishing care here at   [facility]. She reports no visual acuity changes since her last exam. No pain   or discomfort.      Hemoglobin A1C       Date                     Value               Ref Range             Status                09/19/2019               5.8 (H)             4.0 - 5.6 %           Final              Comment:    ADA Screening Guidelines:  5.7-6.4%  Consistent with   prediabetes  >or=6.5%  Consistent with diabetes  High levels of fetal   hemoglobin interfere with the HbA1C  assay. Heterozygous hemoglobin   variants (HbS, HgC, etc)do  not significantly interfere with this assay.     However, presence of multiple variants may affect accuracy.         01/09/2019               6.4 (H)             4.0 - 5.6 %           Final              Comment:    ADA Screening Guidelines:  5.7-6.4%  Consistent with   prediabetes  >or=6.5%  Consistent with diabetes  High levels of fetal   hemoglobin interfere with the HbA1C  assay. Heterozygous hemoglobin   variants (HbS, HgC, etc)do  not significantly interfere with this assay.     However, presence of multiple variants may affect accuracy.    ----------            Last edited by Meliton Shannon on 12/30/2019  2:44 PM. (History)            Assessment /Plan     For exam results, see Encounter Report.    Routine eye exam    Myopia with astigmatism, bilateral    Type 1 diabetes mellitus without retinopathy          1-2.  Glasses rx given.  3.  No retinopathy--monitor yearly.  BS control.  Eye health normal OU.

## 2019-12-31 ENCOUNTER — INITIAL CONSULT (OUTPATIENT)
Dept: DERMATOLOGY | Facility: CLINIC | Age: 39
End: 2019-12-31
Payer: COMMERCIAL

## 2019-12-31 DIAGNOSIS — D22.9 MULTIPLE BENIGN NEVI: Primary | ICD-10-CM

## 2019-12-31 DIAGNOSIS — Z12.83 SCREENING EXAM FOR SKIN CANCER: ICD-10-CM

## 2019-12-31 DIAGNOSIS — B35.3 TINEA PEDIS OF LEFT FOOT: ICD-10-CM

## 2019-12-31 DIAGNOSIS — L70.0 ACNE VULGARIS: ICD-10-CM

## 2019-12-31 PROCEDURE — 99203 PR OFFICE/OUTPT VISIT, NEW, LEVL III, 30-44 MIN: ICD-10-PCS | Mod: S$GLB,,, | Performed by: DERMATOLOGY

## 2019-12-31 PROCEDURE — 99203 OFFICE O/P NEW LOW 30 MIN: CPT | Mod: S$GLB,,, | Performed by: DERMATOLOGY

## 2019-12-31 PROCEDURE — 99999 PR PBB SHADOW E&M-EST. PATIENT-LVL II: CPT | Mod: PBBFAC,,, | Performed by: DERMATOLOGY

## 2019-12-31 PROCEDURE — 99999 PR PBB SHADOW E&M-EST. PATIENT-LVL II: ICD-10-PCS | Mod: PBBFAC,,, | Performed by: DERMATOLOGY

## 2019-12-31 NOTE — PROGRESS NOTES
"  Subjective:       Patient ID:  Thao Ybarra is a 39 y.o. female who presents for   Chief Complaint   Patient presents with    Skin Check     tbse     Patient is a 38 yo woman present for tbse. Patient has no history of any skin cancers    Patient is here today for a "mole" check.   Pt has a history of  moderate sun exposure in the past.   Pt recalls several blistering sunburns in the past- no  Pt has history of tanning bed use- no  Pt has  had moles removed in the past- no  Pt has history of melanoma in first degree relatives-  no    Review of Systems   Skin: Positive for activity-related sunscreen use and wears hat. Negative for daily sunscreen use and recent sunburn.   Hematologic/Lymphatic: Does not bruise/bleed easily.        Objective:    Physical Exam   Constitutional: She appears well-developed and well-nourished.   Neurological: She is alert and oriented to person, place, and time.   Psychiatric: She has a normal mood and affect.   Skin:   Areas Examined (abnormalities noted in diagram):   Scalp / Hair Palpated and Inspected  Head / Face Inspection Performed  Neck Inspection Performed  Chest / Axilla Inspection Performed  Abdomen Inspection Performed  Genitals / Buttocks / Groin Inspection Performed  Back Inspection Performed  RUE Inspected  LUE Inspection Performed  RLE Inspected  LLE Inspection Performed  Nails and Digits Inspection Performed                   Diagram Legend     Erythematous scaling macule/papule c/w actinic keratosis       Vascular papule c/w angioma      Pigmented verrucoid papule/plaque c/w seborrheic keratosis      Yellow umbilicated papule c/w sebaceous hyperplasia      Irregularly shaped tan macule c/w lentigo     1-2 mm smooth white papules consistent with Milia      Movable subcutaneous cyst with punctum c/w epidermal inclusion cyst      Subcutaneous movable cyst c/w pilar cyst      Firm pink to brown papule c/w dermatofibroma      Pedunculated fleshy papule(s) c/w skin tag(s) "      Evenly pigmented macule c/w junctional nevus     Mildly variegated pigmented, slightly irregular-bordered macule c/w mildly atypical nevus      Flesh colored to evenly pigmented papule c/w intradermal nevus       Pink pearly papule/plaque c/w basal cell carcinoma      Erythematous hyperkeratotic cursted plaque c/w SCC      Surgical scar with no sign of skin cancer recurrence      Open and closed comedones      Inflammatory papules and pustules      Verrucoid papule consistent consistent with wart     Erythematous eczematous patches and plaques     Dystrophic onycholytic nail with subungual debris c/w onychomycosis     Umbilicated papule    Erythematous-base heme-crusted tan verrucoid plaque consistent with inflamed seborrheic keratosis     Erythematous Silvery Scaling Plaque c/w Psoriasis     See annotation      Assessment / Plan:        Multiple benign nevi  Discussed ABCDE's of nevi.  Monitor for new mole or moles that are becoming bigger, darker, irritated, or developing irregular borders.     Screening exam for skin cancer  Total body skin examination performed today including at least 12 points as noted in physical examination. No lesions suspicious for malignancy noted.    Acne vulgaris  otc BP wash or salicylic acid    Tinea pedis of left foot  The patient was instructed to start dilute vinegar soaks, with 1 part white household vinegar mixed with 4 parts lukewarm water, to the affected areas for 10-15 minutes three times a week.  otc antifungal cream           Follow up if symptoms worsen or fail to improve, for for TBSE.

## 2019-12-31 NOTE — LETTER
December 31, 2019      Danny Harrington MD  [address]  [facility] [address]           [facility] - Dermatology  [address] [facility] [address]  Phone: [phone]  Fax: [phone]          Patient: Thao Ybarra   MR Number: [id number]   YOB: 1980   Date of Visit: 12/31/2019       Dear Dr. Danny Harrington:    Thank you for referring Thao Ybarra to me for evaluation. Attached you will find relevant portions of my assessment and plan of care.    If you have questions, please do not hesitate to call me. I look forward to following Thao Ybarra along with you.    Sincerely,    Sofia Gaxiola MD    Enclosure  CC:  No Recipients    If you would like to receive this communication electronically, please contact [email or web address] or [phone] to request more information on [employer] Link access.    For providers and/or their staff who would like to refer a patient to [facility], please contact us through our one-stop-shop provider referral line, [facility], at [phone].    If you feel you have received this communication in error or would no longer like to receive these types of communications, please e-mail [email or web address]

## 2020-01-13 DIAGNOSIS — E10.9 CONTROLLED DIABETES MELLITUS TYPE 1 WITHOUT COMPLICATIONS: Primary | ICD-10-CM

## 2020-01-13 RX ORDER — INSULIN ASPART 100 [IU]/ML
INJECTION, SOLUTION INTRAVENOUS; SUBCUTANEOUS
Qty: 30 ML | Refills: 3 | Status: SHIPPED | OUTPATIENT
Start: 2020-01-13 | End: 2020-04-13 | Stop reason: SDUPTHER

## 2020-04-13 ENCOUNTER — TELEPHONE (OUTPATIENT)
Dept: ENDOCRINOLOGY | Facility: CLINIC | Age: 40
End: 2020-04-13

## 2020-04-13 ENCOUNTER — OFFICE VISIT (OUTPATIENT)
Dept: ENDOCRINOLOGY | Facility: CLINIC | Age: 40
End: 2020-04-13
Payer: COMMERCIAL

## 2020-04-13 DIAGNOSIS — E03.9 HYPOTHYROIDISM (ACQUIRED): ICD-10-CM

## 2020-04-13 DIAGNOSIS — E66.9 OBESITY (BMI 35.0-39.9 WITHOUT COMORBIDITY): ICD-10-CM

## 2020-04-13 DIAGNOSIS — E10.9 CONTROLLED DIABETES MELLITUS TYPE 1 WITHOUT COMPLICATIONS: Primary | ICD-10-CM

## 2020-04-13 PROCEDURE — 99214 PR OFFICE/OUTPT VISIT, EST, LEVL IV, 30-39 MIN: ICD-10-PCS | Mod: 95,,, | Performed by: INTERNAL MEDICINE

## 2020-04-13 PROCEDURE — 99214 OFFICE O/P EST MOD 30 MIN: CPT | Mod: 95,,, | Performed by: INTERNAL MEDICINE

## 2020-04-13 PROCEDURE — 3044F PR MOST RECENT HEMOGLOBIN A1C LEVEL <7.0%: ICD-10-PCS | Mod: CPTII,,, | Performed by: INTERNAL MEDICINE

## 2020-04-13 PROCEDURE — 3044F HG A1C LEVEL LT 7.0%: CPT | Mod: CPTII,,, | Performed by: INTERNAL MEDICINE

## 2020-04-13 RX ORDER — INSULIN ASPART 100 [IU]/ML
INJECTION, SOLUTION INTRAVENOUS; SUBCUTANEOUS
Qty: 10 VIAL | Refills: 3 | Status: SHIPPED | OUTPATIENT
Start: 2020-04-13 | End: 2021-04-21

## 2020-04-13 RX ORDER — LEVOTHYROXINE SODIUM 137 UG/1
137 TABLET ORAL
Qty: 90 TABLET | Refills: 3 | Status: SHIPPED | OUTPATIENT
Start: 2020-04-13 | End: 2021-03-19 | Stop reason: SDUPTHER

## 2020-04-13 NOTE — PROGRESS NOTES
Pump Clinic Return Visit    Chief Complaint   Patient presents with    Diabetes        HPI:Thao Ybarra is a 39 y.o. female with a past medical history of type 1 diabetes mellitus on an insulin pump.     The patient location is: home  The chief complaint leading to consultation is: T1DM  Visit type: audiovisual  Total time spent with patient: 30 min  Each patient to whom he or she provides medical services by telemedicine is:  (1) informed of the relationship between the physician and patient and the respective role of any other health care provider with respect to management of the patient; and (2) notified that he or she may decline to receive medical services by telemedicine and may withdraw from such care at any time.    Since her last visit she had her  has started doing the Atkins diet and had significant improvement in glycemic variability with this.  She feels this is a sustainable plan and feels overall better with less glycemic variability.    She has lost some weight with change to diet.  Not her primary goal but nice benefit    Has not had significant issues with hypoglycemia    Current diabetes regimen:  Pump: Medtronic 670 G    Pump Settings  Basal Rate  12:00AM:     0.9 U/h  6:00AM:       1.05 U/h  9:30AM:       1.0 U/h  4:30PM:       1.00 U/h    Does not use wizard but settings as below    Carb Ratio  12A: 1:8    ISF  12A:1:50    Target: 100  IAT: 4h      Lab Results   Component Value Date    HGBA1C 5.8 (H) 09/19/2019       Glucose Monitoring:  Meter/CGM: DexCom G6  Significant for 3% mild hypoglycemia, 78% time in range, 19% above goal    Pt is monitoring blood glucose readings 4 times a day.  Needs >100 strips per month related to fluctuations with blood glucose reading, A1c trends, and activity level.    Hypoglycemic Episodes:  No severe episodes    Since last visit no severe hypoglycemic episodes requiring observer intervention: No    DKA: No    Screening / DM Complications:  Last Foot  Exam: Within 1 year  Neuropathy: No  Last eye exam:  12/2019, no laser surgery or DR  CVD/MI: No    Lab Results   Component Value Date    TSH 2.068 09/19/2019     No results found for: TTGIGA    Review of Systems   Constitutional: Negative for unexpected weight change.   Eyes: Negative for visual disturbance.   Respiratory: Negative for shortness of breath.    Cardiovascular: Negative for chest pain.   Gastrointestinal: Negative for abdominal pain.   Musculoskeletal: Negative for arthralgias.   Skin: Negative for wound.   Allergic/Immunologic: Negative for food allergies.   Neurological: Negative for headaches.   Hematological: Does not bruise/bleed easily.         Chemistry        Component Value Date/Time     11/25/2019 0844    K 4.1 11/25/2019 0844     11/25/2019 0844    CO2 27 11/25/2019 0844    BUN 14 11/25/2019 0844    CREATININE 0.8 11/25/2019 0844    GLU 71 11/25/2019 0844        Component Value Date/Time    CALCIUM 9.1 11/25/2019 0844    ALKPHOS 56 11/25/2019 0844    AST 15 11/25/2019 0844    ALT 12 11/25/2019 0844    BILITOT 0.5 11/25/2019 0844    ESTGFRAFRICA >60.0 11/25/2019 0844    EGFRNONAA >60.0 11/25/2019 0844           Lab Results   Component Value Date    CHOL 167 11/25/2019    CHOL 178 01/09/2019     Lab Results   Component Value Date    HDL 67 11/25/2019    HDL 75 01/09/2019     Lab Results   Component Value Date    LDLCALC 92.8 11/25/2019    LDLCALC 94.2 01/09/2019     Lab Results   Component Value Date    TRIG 36 11/25/2019    TRIG 44 01/09/2019     Lab Results   Component Value Date    CHOLHDL 40.1 11/25/2019    CHOLHDL 42.1 01/09/2019           Assessment/Plan  Controlled diabetes mellitus type 1 without complications  Pump download not available today but CGM reviewed and significant for excellent control meeting glycemic targets  Very little glycemic variability  Will continue pump settings as she is doing  She has adequate supplies for both pump, insulin including insulin  pens  Will check A1c in the next few weeks  Continue annual eye exams    Hypothyroidism (acquired)  Clinically and biochemically euthyroid.  TSH with next labs  Continue levothyroxine 137 mcg daily with titration pending lab results    Obesity (BMI 35.0-39.9 without comorbidity)  Some recent weight loss with dietary change  Encouraged her to continue        Glucagon kit:  Yes    FOLLOW UP  Follow up in about 6 months (around 10/13/2020).       Pump backup plan  If the insulin pump is non functional and discontinued for anticipated more than 20 hours, please give daily injections of:  Long acting insulin 32 units daily  Short acting insulin for meals according to carb ratios and sensitivity factor in the pump.    When the insulin pump is restarted, do not restart basal rates until at least 22 hours after the last long acting insulin injection. You can set a 0% temporary basal setting that will last until this time and use your pump to bolus for meals and correction.    For any technical insulin pump issues, please contact the insulin pump company; the toll free number is printed on the label on the back of the insulin pump.

## 2020-04-14 PROBLEM — E66.9 OBESITY (BMI 35.0-39.9 WITHOUT COMORBIDITY): Status: ACTIVE | Noted: 2020-04-14

## 2020-04-14 NOTE — ASSESSMENT & PLAN NOTE
Pump download not available today but CGM reviewed and significant for excellent control meeting glycemic targets  Very little glycemic variability  Will continue pump settings as she is doing  She has adequate supplies for both pump, insulin including insulin pens  Will check A1c in the next few weeks  Continue annual eye exams

## 2020-04-14 NOTE — ASSESSMENT & PLAN NOTE
Clinically and biochemically euthyroid.  TSH with next labs  Continue levothyroxine 137 mcg daily with titration pending lab results

## 2020-04-21 DIAGNOSIS — Z01.84 ANTIBODY RESPONSE EXAMINATION: ICD-10-CM

## 2020-04-22 ENCOUNTER — LAB VISIT (OUTPATIENT)
Dept: LAB | Facility: HOSPITAL | Age: 40
End: 2020-04-22
Attending: INTERNAL MEDICINE
Payer: COMMERCIAL

## 2020-04-22 DIAGNOSIS — E03.9 HYPOTHYROIDISM (ACQUIRED): ICD-10-CM

## 2020-04-22 DIAGNOSIS — Z01.84 ANTIBODY RESPONSE EXAMINATION: ICD-10-CM

## 2020-04-22 DIAGNOSIS — E10.9 CONTROLLED DIABETES MELLITUS TYPE 1 WITHOUT COMPLICATIONS: ICD-10-CM

## 2020-04-22 LAB
ESTIMATED AVG GLUCOSE: 128 MG/DL (ref 68–131)
HBA1C MFR BLD HPLC: 6.1 % (ref 4–5.6)
SARS-COV-2 IGG SERPL QL IA: NEGATIVE
TSH SERPL DL<=0.005 MIU/L-ACNC: 1.85 UIU/ML (ref 0.4–4)

## 2020-04-22 PROCEDURE — 84443 ASSAY THYROID STIM HORMONE: CPT

## 2020-04-22 PROCEDURE — 86769 SARS-COV-2 COVID-19 ANTIBODY: CPT

## 2020-04-22 PROCEDURE — 36415 COLL VENOUS BLD VENIPUNCTURE: CPT

## 2020-04-22 PROCEDURE — 83036 HEMOGLOBIN GLYCOSYLATED A1C: CPT

## 2020-12-16 ENCOUNTER — IMMUNIZATION (OUTPATIENT)
Dept: INTERNAL MEDICINE | Facility: CLINIC | Age: 40
End: 2020-12-16
Payer: COMMERCIAL

## 2020-12-16 DIAGNOSIS — Z23 NEED FOR VACCINATION: ICD-10-CM

## 2020-12-16 PROCEDURE — 91300 COVID-19, MRNA, LNP-S, PF, 30 MCG/0.3 ML DOSE VACCINE: CPT | Mod: ,,, | Performed by: INTERNAL MEDICINE

## 2020-12-16 PROCEDURE — 0001A COVID-19, MRNA, LNP-S, PF, 30 MCG/0.3 ML DOSE VACCINE: CPT | Mod: CV19,,, | Performed by: INTERNAL MEDICINE

## 2020-12-16 PROCEDURE — 0001A COVID-19, MRNA, LNP-S, PF, 30 MCG/0.3 ML DOSE VACCINE: ICD-10-PCS | Mod: CV19,,, | Performed by: INTERNAL MEDICINE

## 2020-12-16 PROCEDURE — 91300 COVID-19, MRNA, LNP-S, PF, 30 MCG/0.3 ML DOSE VACCINE: ICD-10-PCS | Mod: ,,, | Performed by: INTERNAL MEDICINE

## 2021-01-07 ENCOUNTER — IMMUNIZATION (OUTPATIENT)
Dept: INTERNAL MEDICINE | Facility: CLINIC | Age: 41
End: 2021-01-07
Payer: COMMERCIAL

## 2021-01-07 DIAGNOSIS — Z23 NEED FOR VACCINATION: ICD-10-CM

## 2021-01-07 PROCEDURE — 0002A COVID-19, MRNA, LNP-S, PF, 30 MCG/0.3 ML DOSE VACCINE: CPT | Mod: PBBFAC | Performed by: INTERNAL MEDICINE

## 2021-01-07 PROCEDURE — 91300 COVID-19, MRNA, LNP-S, PF, 30 MCG/0.3 ML DOSE VACCINE: CPT | Mod: PBBFAC | Performed by: INTERNAL MEDICINE

## 2021-03-19 ENCOUNTER — OFFICE VISIT (OUTPATIENT)
Dept: PRIMARY CARE CLINIC | Facility: CLINIC | Age: 41
End: 2021-03-19
Payer: COMMERCIAL

## 2021-03-19 VITALS
DIASTOLIC BLOOD PRESSURE: 70 MMHG | WEIGHT: 250.69 LBS | HEART RATE: 72 BPM | SYSTOLIC BLOOD PRESSURE: 128 MMHG | HEIGHT: 72 IN | TEMPERATURE: 98 F | OXYGEN SATURATION: 98 % | BODY MASS INDEX: 33.95 KG/M2

## 2021-03-19 DIAGNOSIS — Z12.39 ENCOUNTER FOR SCREENING FOR MALIGNANT NEOPLASM OF BREAST, UNSPECIFIED SCREENING MODALITY: ICD-10-CM

## 2021-03-19 DIAGNOSIS — Z79.899 ON STATIN THERAPY: ICD-10-CM

## 2021-03-19 DIAGNOSIS — E10.9 CONTROLLED DIABETES MELLITUS TYPE 1 WITHOUT COMPLICATIONS: ICD-10-CM

## 2021-03-19 DIAGNOSIS — E03.9 HYPOTHYROIDISM (ACQUIRED): ICD-10-CM

## 2021-03-19 DIAGNOSIS — Z23 NEED FOR PNEUMOCOCCAL VACCINATION: ICD-10-CM

## 2021-03-19 DIAGNOSIS — Z00.00 ROUTINE MEDICAL EXAM: Primary | ICD-10-CM

## 2021-03-19 PROCEDURE — 1126F AMNT PAIN NOTED NONE PRSNT: CPT | Mod: S$GLB,,, | Performed by: FAMILY MEDICINE

## 2021-03-19 PROCEDURE — 90471 IMMUNIZATION ADMIN: CPT | Mod: S$GLB,,, | Performed by: FAMILY MEDICINE

## 2021-03-19 PROCEDURE — 90732 PPSV23 VACC 2 YRS+ SUBQ/IM: CPT | Mod: S$GLB,,, | Performed by: FAMILY MEDICINE

## 2021-03-19 PROCEDURE — 1126F PR PAIN SEVERITY QUANTIFIED, NO PAIN PRESENT: ICD-10-PCS | Mod: S$GLB,,, | Performed by: FAMILY MEDICINE

## 2021-03-19 PROCEDURE — 99396 PR PREVENTIVE VISIT,EST,40-64: ICD-10-PCS | Mod: 25,S$GLB,, | Performed by: FAMILY MEDICINE

## 2021-03-19 PROCEDURE — 99396 PREV VISIT EST AGE 40-64: CPT | Mod: 25,S$GLB,, | Performed by: FAMILY MEDICINE

## 2021-03-19 PROCEDURE — 3044F HG A1C LEVEL LT 7.0%: CPT | Mod: CPTII,S$GLB,, | Performed by: FAMILY MEDICINE

## 2021-03-19 PROCEDURE — 3008F BODY MASS INDEX DOCD: CPT | Mod: CPTII,S$GLB,, | Performed by: FAMILY MEDICINE

## 2021-03-19 PROCEDURE — 99999 PR PBB SHADOW E&M-EST. PATIENT-LVL IV: CPT | Mod: PBBFAC,,, | Performed by: FAMILY MEDICINE

## 2021-03-19 PROCEDURE — 3044F PR MOST RECENT HEMOGLOBIN A1C LEVEL <7.0%: ICD-10-PCS | Mod: CPTII,S$GLB,, | Performed by: FAMILY MEDICINE

## 2021-03-19 PROCEDURE — 90732 PNEUMOCOCCAL POLYSACCHARIDE VACCINE 23-VALENT =>2YO SQ IM: ICD-10-PCS | Mod: S$GLB,,, | Performed by: FAMILY MEDICINE

## 2021-03-19 PROCEDURE — 3008F PR BODY MASS INDEX (BMI) DOCUMENTED: ICD-10-PCS | Mod: CPTII,S$GLB,, | Performed by: FAMILY MEDICINE

## 2021-03-19 PROCEDURE — 90471 PNEUMOCOCCAL POLYSACCHARIDE VACCINE 23-VALENT =>2YO SQ IM: ICD-10-PCS | Mod: S$GLB,,, | Performed by: FAMILY MEDICINE

## 2021-03-19 PROCEDURE — 99999 PR PBB SHADOW E&M-EST. PATIENT-LVL IV: ICD-10-PCS | Mod: PBBFAC,,, | Performed by: FAMILY MEDICINE

## 2021-03-19 RX ORDER — ROSUVASTATIN CALCIUM 5 MG/1
5 TABLET, COATED ORAL DAILY
Qty: 90 TABLET | Refills: 3 | Status: SHIPPED | OUTPATIENT
Start: 2021-03-19 | End: 2022-03-19

## 2021-03-19 RX ORDER — LEVOTHYROXINE SODIUM 137 UG/1
137 TABLET ORAL
Qty: 90 TABLET | Refills: 3 | Status: SHIPPED | OUTPATIENT
Start: 2021-03-19 | End: 2022-05-09

## 2021-03-19 RX ORDER — SERTRALINE HYDROCHLORIDE 50 MG/1
50 TABLET, FILM COATED ORAL DAILY
Qty: 90 TABLET | Refills: 3 | Status: SHIPPED | OUTPATIENT
Start: 2021-03-19 | End: 2022-05-09

## 2021-03-22 ENCOUNTER — HOSPITAL ENCOUNTER (OUTPATIENT)
Dept: RADIOLOGY | Facility: HOSPITAL | Age: 41
Discharge: HOME | End: 2021-03-22
Attending: FAMILY MEDICINE
Payer: COMMERCIAL

## 2021-03-22 DIAGNOSIS — Z12.39 ENCOUNTER FOR SCREENING FOR MALIGNANT NEOPLASM OF BREAST, UNSPECIFIED SCREENING MODALITY: ICD-10-CM

## 2021-03-22 PROBLEM — E66.9 OBESITY (BMI 35.0-39.9 WITHOUT COMORBIDITY): Status: RESOLVED | Noted: 2020-04-14 | Resolved: 2021-03-22

## 2021-03-22 PROCEDURE — 77063 MAMMO DIGITAL SCREENING BILAT WITH TOMO: ICD-10-PCS | Mod: 26,,, | Performed by: RADIOLOGY

## 2021-03-22 PROCEDURE — 77067 MAMMO DIGITAL SCREENING BILAT WITH TOMO: ICD-10-PCS | Mod: 26,,, | Performed by: RADIOLOGY

## 2021-03-22 PROCEDURE — 77063 BREAST TOMOSYNTHESIS BI: CPT | Mod: 26,,, | Performed by: RADIOLOGY

## 2021-03-22 PROCEDURE — 77067 SCR MAMMO BI INCL CAD: CPT | Mod: 26,,, | Performed by: RADIOLOGY

## 2021-03-22 PROCEDURE — 77067 SCR MAMMO BI INCL CAD: CPT | Mod: TC,PN

## 2021-04-05 ENCOUNTER — PATIENT MESSAGE (OUTPATIENT)
Dept: ADMINISTRATIVE | Facility: HOSPITAL | Age: 41
End: 2021-04-05

## 2021-04-19 ENCOUNTER — PATIENT MESSAGE (OUTPATIENT)
Dept: PRIMARY CARE CLINIC | Facility: CLINIC | Age: 41
End: 2021-04-19

## 2021-04-20 ENCOUNTER — PATIENT MESSAGE (OUTPATIENT)
Dept: PRIMARY CARE CLINIC | Facility: CLINIC | Age: 41
End: 2021-04-20

## 2021-04-20 ENCOUNTER — PATIENT OUTREACH (OUTPATIENT)
Dept: ADMINISTRATIVE | Facility: OTHER | Age: 41
End: 2021-04-20

## 2021-04-20 DIAGNOSIS — E11.9 TYPE 2 DIABETES MELLITUS WITHOUT COMPLICATION, UNSPECIFIED WHETHER LONG TERM INSULIN USE: Primary | ICD-10-CM

## 2021-04-20 RX ORDER — CICLOPIROX 80 MG/ML
SOLUTION TOPICAL NIGHTLY
Qty: 6.6 ML | Refills: 11 | Status: SHIPPED | OUTPATIENT
Start: 2021-04-20 | End: 2022-05-23

## 2021-04-21 ENCOUNTER — OFFICE VISIT (OUTPATIENT)
Dept: ENDOCRINOLOGY | Facility: CLINIC | Age: 41
End: 2021-04-21
Payer: COMMERCIAL

## 2021-04-21 DIAGNOSIS — E10.9 CONTROLLED DIABETES MELLITUS TYPE 1 WITHOUT COMPLICATIONS: ICD-10-CM

## 2021-04-21 DIAGNOSIS — E03.9 HYPOTHYROIDISM (ACQUIRED): ICD-10-CM

## 2021-04-21 DIAGNOSIS — Z96.41 INSULIN PUMP STATUS: ICD-10-CM

## 2021-04-21 PROCEDURE — 99214 OFFICE O/P EST MOD 30 MIN: CPT | Mod: 95,,, | Performed by: INTERNAL MEDICINE

## 2021-04-21 PROCEDURE — 99214 PR OFFICE/OUTPT VISIT, EST, LEVL IV, 30-39 MIN: ICD-10-PCS | Mod: 95,,, | Performed by: INTERNAL MEDICINE

## 2021-04-21 PROCEDURE — 3044F PR MOST RECENT HEMOGLOBIN A1C LEVEL <7.0%: ICD-10-PCS | Mod: CPTII,,, | Performed by: INTERNAL MEDICINE

## 2021-04-21 PROCEDURE — 3044F HG A1C LEVEL LT 7.0%: CPT | Mod: CPTII,,, | Performed by: INTERNAL MEDICINE

## 2021-04-21 RX ORDER — INSULIN ASPART 100 [IU]/ML
INJECTION, SOLUTION INTRAVENOUS; SUBCUTANEOUS
Qty: 10 VIAL | Refills: 3 | Status: SHIPPED | OUTPATIENT
Start: 2021-04-21 | End: 2022-02-16 | Stop reason: SDUPTHER

## 2021-07-07 ENCOUNTER — PATIENT MESSAGE (OUTPATIENT)
Dept: ADMINISTRATIVE | Facility: HOSPITAL | Age: 41
End: 2021-07-07

## 2021-09-28 ENCOUNTER — IMMUNIZATION (OUTPATIENT)
Dept: INTERNAL MEDICINE | Facility: CLINIC | Age: 41
End: 2021-09-28
Payer: COMMERCIAL

## 2021-09-28 DIAGNOSIS — Z23 NEED FOR VACCINATION: Primary | ICD-10-CM

## 2021-09-28 PROCEDURE — 0003A COVID-19, MRNA, LNP-S, PF, 30 MCG/0.3 ML DOSE VACCINE: CPT | Mod: CV19,PBBFAC | Performed by: INTERNAL MEDICINE

## 2021-09-28 PROCEDURE — 91300 COVID-19, MRNA, LNP-S, PF, 30 MCG/0.3 ML DOSE VACCINE: CPT | Mod: PBBFAC | Performed by: INTERNAL MEDICINE

## 2021-10-05 ENCOUNTER — PATIENT MESSAGE (OUTPATIENT)
Dept: ADMINISTRATIVE | Facility: HOSPITAL | Age: 41
End: 2021-10-05

## 2021-12-01 ENCOUNTER — PATIENT MESSAGE (OUTPATIENT)
Dept: ENDOCRINOLOGY | Facility: CLINIC | Age: 41
End: 2021-12-01
Payer: COMMERCIAL

## 2021-12-01 ENCOUNTER — TELEPHONE (OUTPATIENT)
Dept: ENDOCRINOLOGY | Facility: CLINIC | Age: 41
End: 2021-12-01
Payer: COMMERCIAL

## 2021-12-01 RX ORDER — BLOOD-GLUCOSE TRANSMITTER
1 EACH MISCELLANEOUS
Qty: 1 EACH | Refills: 3 | Status: SHIPPED | OUTPATIENT
Start: 2021-12-01 | End: 2023-01-30 | Stop reason: SDUPTHER

## 2021-12-01 RX ORDER — BLOOD-GLUCOSE SENSOR
1 EACH MISCELLANEOUS
Qty: 9 EACH | Refills: 3 | Status: SHIPPED | OUTPATIENT
Start: 2021-12-01 | End: 2023-01-30 | Stop reason: SDUPTHER

## 2021-12-17 ENCOUNTER — OFFICE VISIT (OUTPATIENT)
Dept: OPTOMETRY | Facility: CLINIC | Age: 41
End: 2021-12-17
Payer: COMMERCIAL

## 2021-12-17 DIAGNOSIS — H52.203 MYOPIA WITH ASTIGMATISM, BILATERAL: ICD-10-CM

## 2021-12-17 DIAGNOSIS — E10.9 TYPE 1 DIABETES MELLITUS WITHOUT RETINOPATHY: ICD-10-CM

## 2021-12-17 DIAGNOSIS — H52.13 MYOPIA WITH ASTIGMATISM, BILATERAL: ICD-10-CM

## 2021-12-17 DIAGNOSIS — Z01.00 ROUTINE EYE EXAM: Primary | ICD-10-CM

## 2021-12-17 PROCEDURE — 92014 PR EYE EXAM, EST PATIENT,COMPREHESV: ICD-10-PCS | Mod: S$GLB,,, | Performed by: OPTOMETRIST

## 2021-12-17 PROCEDURE — 99999 PR PBB SHADOW E&M-EST. PATIENT-LVL III: CPT | Mod: PBBFAC,,, | Performed by: OPTOMETRIST

## 2021-12-17 PROCEDURE — 92014 COMPRE OPH EXAM EST PT 1/>: CPT | Mod: S$GLB,,, | Performed by: OPTOMETRIST

## 2021-12-17 PROCEDURE — 99999 PR PBB SHADOW E&M-EST. PATIENT-LVL III: ICD-10-PCS | Mod: PBBFAC,,, | Performed by: OPTOMETRIST

## 2021-12-17 PROCEDURE — 92015 DETERMINE REFRACTIVE STATE: CPT | Mod: S$GLB,,, | Performed by: OPTOMETRIST

## 2021-12-17 PROCEDURE — 92015 PR REFRACTION: ICD-10-PCS | Mod: S$GLB,,, | Performed by: OPTOMETRIST

## 2021-12-21 ENCOUNTER — PATIENT MESSAGE (OUTPATIENT)
Dept: ADMINISTRATIVE | Facility: HOSPITAL | Age: 41
End: 2021-12-21
Payer: COMMERCIAL

## 2022-01-18 ENCOUNTER — PATIENT MESSAGE (OUTPATIENT)
Dept: ADMINISTRATIVE | Facility: HOSPITAL | Age: 42
End: 2022-01-18
Payer: COMMERCIAL

## 2022-01-19 ENCOUNTER — PATIENT MESSAGE (OUTPATIENT)
Dept: ADMINISTRATIVE | Facility: HOSPITAL | Age: 42
End: 2022-01-19
Payer: COMMERCIAL

## 2022-02-11 ENCOUNTER — TELEPHONE (OUTPATIENT)
Dept: ENDOCRINOLOGY | Facility: CLINIC | Age: 42
End: 2022-02-11
Payer: COMMERCIAL

## 2022-02-16 ENCOUNTER — OFFICE VISIT (OUTPATIENT)
Dept: ENDOCRINOLOGY | Facility: CLINIC | Age: 42
End: 2022-02-16
Payer: COMMERCIAL

## 2022-02-16 ENCOUNTER — PATIENT MESSAGE (OUTPATIENT)
Dept: ENDOCRINOLOGY | Facility: CLINIC | Age: 42
End: 2022-02-16

## 2022-02-16 DIAGNOSIS — E10.9 CONTROLLED DIABETES MELLITUS TYPE 1 WITHOUT COMPLICATIONS: Primary | ICD-10-CM

## 2022-02-16 DIAGNOSIS — Z96.41 INSULIN PUMP STATUS: ICD-10-CM

## 2022-02-16 DIAGNOSIS — E03.9 HYPOTHYROIDISM (ACQUIRED): ICD-10-CM

## 2022-02-16 PROCEDURE — 1159F PR MEDICATION LIST DOCUMENTED IN MEDICAL RECORD: ICD-10-PCS | Mod: CPTII,95,, | Performed by: INTERNAL MEDICINE

## 2022-02-16 PROCEDURE — 3072F PR LOW RISK FOR RETINOPATHY: ICD-10-PCS | Mod: CPTII,95,, | Performed by: INTERNAL MEDICINE

## 2022-02-16 PROCEDURE — 99214 OFFICE O/P EST MOD 30 MIN: CPT | Mod: 95,,, | Performed by: INTERNAL MEDICINE

## 2022-02-16 PROCEDURE — 99214 PR OFFICE/OUTPT VISIT, EST, LEVL IV, 30-39 MIN: ICD-10-PCS | Mod: 95,,, | Performed by: INTERNAL MEDICINE

## 2022-02-16 PROCEDURE — 1160F RVW MEDS BY RX/DR IN RCRD: CPT | Mod: CPTII,95,, | Performed by: INTERNAL MEDICINE

## 2022-02-16 PROCEDURE — 1159F MED LIST DOCD IN RCRD: CPT | Mod: CPTII,95,, | Performed by: INTERNAL MEDICINE

## 2022-02-16 PROCEDURE — 3072F LOW RISK FOR RETINOPATHY: CPT | Mod: CPTII,95,, | Performed by: INTERNAL MEDICINE

## 2022-02-16 PROCEDURE — 1160F PR REVIEW ALL MEDS BY PRESCRIBER/CLIN PHARMACIST DOCUMENTED: ICD-10-PCS | Mod: CPTII,95,, | Performed by: INTERNAL MEDICINE

## 2022-02-16 RX ORDER — INSULIN ASPART 100 [IU]/ML
INJECTION, SOLUTION INTRAVENOUS; SUBCUTANEOUS
Qty: 10 EACH | Refills: 3 | Status: SHIPPED | OUTPATIENT
Start: 2022-02-16 | End: 2023-10-16 | Stop reason: SDUPTHER

## 2022-02-16 NOTE — ASSESSMENT & PLAN NOTE
Dexcom reviewed and significant for hyperglycemia with lunch and dinner followed by lows related to correcting/stacking  Due for pump upgrade and discussed options. She would like to change to TSlim with Control [employer], will send paperwork    Will make the following changes:  Basal Rate  12:00AM: 0.9 U/h  6:00AM:       1.0 U/h  9:30AM:       1.2 u/hr  12:30 PM 1.2  8: PM 1.1    Carb ratio 1:8  ISF 1:40 >120    She will try bolus wizard when gets new pump    Due for labs    In  Person visit next time for foot exam

## 2022-02-16 NOTE — Clinical Note
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

## 2022-02-16 NOTE — PROGRESS NOTES
Pump Clinic Return Visit    Chief Complaint   Patient presents with    Diabetes    Hypothyroidism       The patient location is: [place]  The chief complaint leading to consultation is: T1DM    Visit type: audiovisual    Face to Face time with patient: 25  35 minutes of total time spent on the encounter, which includes face to face time and non-face to face time preparing to see the patient (eg, review of tests), Obtaining and/or reviewing separately obtained history, Documenting clinical information in the electronic or other health record, Independently interpreting results (not separately reported) and communicating results to the patient/family/caregiver, or Care coordination (not separately reported).         Each patient to whom he or she provides medical services by telemedicine is:  (1) informed of the relationship between the physician and patient and the respective role of any other health care provider with respect to management of the patient; and (2) notified that he or she may decline to receive medical services by telemedicine and may withdraw from such care at any time.       HPI:Thao Ybarra is a 41 y.o. female with a past medical history of type 1 diabetes mellitus on an insulin pump.     Since last visit has been doing ok. Life has been busy and leads to variability in sugars  Some highs during day and then lows at night  Thinks due for pump upgrade now    Current diabetes regimen:  Pump: Medtronic 670 G    Pump Settings  Basal Rate  12:00AM: 0.9 U/h  6:00AM:  1.0 U/h  9:30AM: 1.2 U/h  12:30 PM 1.2  8:00 PM 1.1    Does not use wizard but settings as below    Carb Ratio  12A: 1:10    ISF  12A:1:50    Target: 100  IAT: 4h      Lab Results   Component Value Date    HGBA1C 6.1 (H) 03/22/2021       Glucose Monitoring:  Meter/CGM: Dexcom G6        Pt is monitoring blood glucose readings 4 times a day.  Needs >100 strips per month related to fluctuations with blood glucose reading, A1c trends, and activity  level.    Hypoglycemic Episodes:  No severe episodes    Since last visit no severe hypoglycemic episodes requiring observer intervention    DKA: No    Screening / DM Complications:    Nephropathy:  ACEi/ARB: not taking  Lab Results   Component Value Date    MICALBCREAT 4.0 01/17/2019       Last Lipid Panel:  Statin: Taking  Lab Results   Component Value Date    LDLCALC 92.8 11/25/2019       Last foot exam : 09/18/2019  Last eye exam : 12/17/2021;  no laser surgery or DR    Hypothyroidism  Taking LT4 137 mcg daily. Stable dose for many years  Lab Results   Component Value Date    TSH 1.833 03/22/2021       No results found for: TTGIGA    Aspirin: no        Current Outpatient Medications:     blood sugar diagnostic Strp, To check BG 4 times daily, to use with insurance preferred meter., Disp: 200 strip, Rfl: 3    blood-glucose meter (CONTOUR LINK MISC), by Misc.(Non-Drug; Combo Route) route., Disp: , Rfl:     blood-glucose sensor (DEXCOM G6 SENSOR) Crystal, 1 Device by Misc.(Non-Drug; Combo Route) route every 10 days., Disp: 9 each, Rfl: 3    blood-glucose transmitter (DEXCOM G6 TRANSMITTER) Crystal, 1 Device by Misc.(Non-Drug; Combo Route) route every 3 (three) months., Disp: 1 each, Rfl: 3    ciclopirox (PENLAC) 8 % Soln, Apply topically nightly. Apply greater than 8 hr before washing; clean toenail with alcohol Q 7 days.  Maximum therapy of 48 weeks, Disp: 6.6 mL, Rfl: 11    glucagon, human recombinant, (GLUCAGON EMERGENCY KIT, HUMAN,) 1 mg SolR, Inject 1 mg into the muscle as needed., Disp: 1 each, Rfl: 1    insulin (LANTUS SOLOSTAR U-100 INSULIN) glargine 100 units/mL (3mL) SubQ pen, Inject 32 Units into the skin once daily. In case insulin pump fails, Disp: 3 mL, Rfl: 1    insulin aspart U-100 (NOVOLOG U-100 INSULIN ASPART) 100 unit/mL injection, For use in insulin pump, max  units per day, Disp: 10 each, Rfl: 3    levothyroxine (SYNTHROID) 137 MCG Tab tablet, Take 1 tablet (137 mcg total) by mouth  before breakfast., Disp: 90 tablet, Rfl: 3    rosuvastatin (CRESTOR) 5 MG tablet, Take 1 tablet (5 mg total) by mouth once daily., Disp: 90 tablet, Rfl: 3    sertraline (ZOLOFT) 50 MG tablet, Take 1 tablet (50 mg total) by mouth once daily., Disp: 90 tablet, Rfl: 3        ROS as above      Chemistry        Component Value Date/Time     03/22/2021 1130    K 4.6 03/22/2021 1130     03/22/2021 1130    CO2 25 03/22/2021 1130    BUN 10 03/22/2021 1130    CREATININE 0.9 03/22/2021 1130     (H) 03/22/2021 1130        Component Value Date/Time    CALCIUM 9.1 03/22/2021 1130    ALKPHOS 48 (L) 03/22/2021 1130    AST 23 03/22/2021 1130    ALT 17 03/22/2021 1130    BILITOT 0.4 03/22/2021 1130    ESTGFRAFRICA >60.0 03/22/2021 1130    EGFRNONAA >60.0 03/22/2021 1130           Lab Results   Component Value Date    CHOL 167 11/25/2019    CHOL 178 01/09/2019     Lab Results   Component Value Date    HDL 67 11/25/2019    HDL 75 01/09/2019     Lab Results   Component Value Date    LDLCALC 92.8 11/25/2019    LDLCALC 94.2 01/09/2019     Lab Results   Component Value Date    TRIG 36 11/25/2019    TRIG 44 01/09/2019     Lab Results   Component Value Date    CHOLHDL 40.1 11/25/2019    CHOLHDL 42.1 01/09/2019           Assessment/Plan  Controlled diabetes mellitus type 1 without complications  Dexcom reviewed and significant for hyperglycemia with lunch and dinner followed by lows related to correcting/stacking  Due for pump upgrade and discussed options. She would like to change to TSlim with Control IQ, will send paperwork    Will make the following changes:  Basal Rate  12:00AM: 0.9 U/h  6:00AM:       1.0 U/h  9:30AM:       1.2 u/hr  12:30 PM 1.2  8: PM 1.1    Carb ratio 1:8  ISF 1:40 >120    She will try bolus wizard when gets new pump    Due for labs    In  Person visit next time for foot exam    Hypothyroidism (acquired)  Check TSH with next labs  Cont LT4 137 mcg daily with titration pending labs    Insulin pump  status  See above      LT4 refill after labs    Glucagon kit:  Yes    FOLLOW UP  No follow-ups on file.       Pump backup plan    If the insulin pump is non functional and discontinued for anticipated more than 20 hours, please give daily injections of:  Long acting insulin: lantus 32 units daily  Short acting insulin:  novolog with carb ratio of 1 unit per 8 grams and correction 1 unit per 40 above 120    When the insulin pump is restarted, do not restart basal rates until at least 22 hours after the last long acting insulin injection. You can set a 0% temporary basal setting that will last until this time and use your pump to bolus for meals and correction.    For any technical insulin pump issues, please contact the insulin pump company; the toll free number is printed on the label on the back of the insulin pump.

## 2022-02-16 NOTE — PATIENT INSTRUCTIONS
Send me new Dexcom Share code    Please change carb ratio to 1:8 and correction to 1:40    I will send paperwork for TSlim pump. Let me know once you get it and I will set you up to see DM education    Labs next few weeks (fasting so we can get lipids)

## 2022-02-22 ENCOUNTER — LAB VISIT (OUTPATIENT)
Dept: LAB | Facility: HOSPITAL | Age: 42
End: 2022-02-22
Attending: INTERNAL MEDICINE
Payer: COMMERCIAL

## 2022-02-22 DIAGNOSIS — E03.9 HYPOTHYROIDISM (ACQUIRED): ICD-10-CM

## 2022-02-22 DIAGNOSIS — E10.9 CONTROLLED DIABETES MELLITUS TYPE 1 WITHOUT COMPLICATIONS: ICD-10-CM

## 2022-02-22 LAB
ALBUMIN SERPL BCP-MCNC: 3.7 G/DL (ref 3.5–5.2)
ALP SERPL-CCNC: 57 U/L (ref 55–135)
ALT SERPL W/O P-5'-P-CCNC: 18 U/L (ref 10–44)
ANION GAP SERPL CALC-SCNC: 10 MMOL/L (ref 8–16)
AST SERPL-CCNC: 21 U/L (ref 10–40)
BILIRUB SERPL-MCNC: 0.5 MG/DL (ref 0.1–1)
BUN SERPL-MCNC: 10 MG/DL (ref 6–20)
CALCIUM SERPL-MCNC: 9.3 MG/DL (ref 8.7–10.5)
CHLORIDE SERPL-SCNC: 105 MMOL/L (ref 95–110)
CHOLEST SERPL-MCNC: 135 MG/DL (ref 120–199)
CHOLEST/HDLC SERPL: 2 {RATIO} (ref 2–5)
CO2 SERPL-SCNC: 28 MMOL/L (ref 23–29)
CREAT SERPL-MCNC: 0.7 MG/DL (ref 0.5–1.4)
EST. GFR  (AFRICAN AMERICAN): >60 ML/MIN/1.73 M^2
EST. GFR  (NON AFRICAN AMERICAN): >60 ML/MIN/1.73 M^2
ESTIMATED AVG GLUCOSE: 131 MG/DL (ref 68–131)
GLUCOSE SERPL-MCNC: 128 MG/DL (ref 70–110)
HBA1C MFR BLD: 6.2 % (ref 4–5.6)
HDLC SERPL-MCNC: 67 MG/DL (ref 40–75)
HDLC SERPL: 49.6 % (ref 20–50)
LDLC SERPL CALC-MCNC: 58 MG/DL (ref 63–159)
NONHDLC SERPL-MCNC: 68 MG/DL
POTASSIUM SERPL-SCNC: 4.2 MMOL/L (ref 3.5–5.1)
PROT SERPL-MCNC: 6.8 G/DL (ref 6–8.4)
SODIUM SERPL-SCNC: 143 MMOL/L (ref 136–145)
TRIGL SERPL-MCNC: 50 MG/DL (ref 30–150)
TSH SERPL DL<=0.005 MIU/L-ACNC: 2.39 UIU/ML (ref 0.4–4)

## 2022-02-22 PROCEDURE — 83036 HEMOGLOBIN GLYCOSYLATED A1C: CPT | Performed by: INTERNAL MEDICINE

## 2022-02-22 PROCEDURE — 84443 ASSAY THYROID STIM HORMONE: CPT | Performed by: INTERNAL MEDICINE

## 2022-02-22 PROCEDURE — 36415 COLL VENOUS BLD VENIPUNCTURE: CPT | Performed by: INTERNAL MEDICINE

## 2022-02-22 PROCEDURE — 80053 COMPREHEN METABOLIC PANEL: CPT | Performed by: INTERNAL MEDICINE

## 2022-02-22 PROCEDURE — 80061 LIPID PANEL: CPT | Performed by: INTERNAL MEDICINE

## 2022-03-16 NOTE — TELEPHONE ENCOUNTER
Care Due:                  Date            Visit Type   Department     Provider  --------------------------------------------------------------------------------                                MYCHART                              FOLLOWUP/OF  [facility]  Last Visit: 03-      FICE VISIT   PETER Harrington  Next Visit: None Scheduled  None         None Found                                                            Last  Test          Frequency    Reason                     Performed    Due Date  --------------------------------------------------------------------------------    Office Visit  12 months..  levothyroxine,             03- 03-                             rosuvastatin, sertraline.    Powered by [employer]. Reference number: [id number].   3/16/2022 12:04:57 AM CDT

## 2022-03-19 RX ORDER — ROSUVASTATIN CALCIUM 5 MG/1
TABLET, COATED ORAL
Qty: 90 TABLET | Refills: 0 | Status: SHIPPED | OUTPATIENT
Start: 2022-03-19 | End: 2022-05-23 | Stop reason: SDUPTHER

## 2022-03-19 NOTE — TELEPHONE ENCOUNTER
Refill Authorization Note   Thao Ybarra  is requesting a refill authorization.  Brief Assessment and Rationale for Refill:  Approve    -Medication-Related Problems Identified: Requires appointment  Medication Therapy Plan:       Medication Reconciliation Completed: No   Comments:   --->Care Gap information included below if applicable.       Requested Prescriptions   Pending Prescriptions Disp Refills    rosuvastatin (CRESTOR) 5 MG tablet [Pharmacy Med Name: ROSUVASTATIN CALCIUM 5 MG TAB] 90 tablet 0     Sig: TAKE 1 TABLET BY MOUTH EVERY DAY       Cardiovascular:  Antilipid - Statins Passed - 3/19/2022 11:34 AM        Passed - Patient is at least 18 years old        Passed - Negative Pregnancy Status Check        Passed - Valid encounter within last 15 months     Recent Visits  Date Type Provider Dept   03/19/21 Office Visit Danny Harrington MD [facility]   Showing recent visits within past 720 days and meeting all other requirements  Future Appointments  No visits were found meeting these conditions.  Showing future appointments within next 150 days and meeting all other requirements      Future Appointments              In 2 months MD [facility] - Ob/Gyn 5th Fl, [facility]    In 3 months [facility]MP [facility], [facility] - Endo Diabetes 6th Fl, [facility]                Passed - ALT is 131 or below and within 360 days     ALT   Date Value Ref Range Status   02/22/2022 18 10 - 44 U/L Final   03/22/2021 17 10 - 44 U/L Final   11/25/2019 12 10 - 44 U/L Final              Passed - AST is 119 or below and within 360 days     AST   Date Value Ref Range Status   02/22/2022 21 10 - 40 U/L Final   03/22/2021 23 10 - 40 U/L Final   11/25/2019 15 10 - 40 U/L Final              Passed - Total Cholesterol within 360 days     Lab Results   Component Value Date    CHOL 135 02/22/2022    CHOL 167 11/25/2019    CHOL 178 01/09/2019              Passed - LDL within 360 days     LDL Cholesterol   Date Value Ref  Range Status   02/22/2022 58.0 (L) 63.0 - 159.0 mg/dL Final     Comment:     The National Cholesterol Education Program (NCEP) has set the  following guidelines (reference values) for LDL Cholesterol:  Optimal.......................<130 mg/dL  Borderline High...............130-159 mg/dL  High..........................160-189 mg/dL  Very High.....................>190 mg/dL              Passed - HDL within 360 days     HDL   Date Value Ref Range Status   02/22/2022 67 40 - 75 mg/dL Final     Comment:     The National Cholesterol Education Program (NCEP) has set the  following guidelines (reference values) for HDL Cholesterol:  Low...............<40 mg/dL  Optimal...........>60 mg/dL              Passed - Triglycerides within 360 days     Lab Results   Component Value Date    TRIG 50 02/22/2022    TRIG 36 11/25/2019    TRIG 44 01/09/2019                  Appointments  past 12m or future 3m with PCP    Date Provider   Last Visit   3/19/2021 Danny Harrington MD   Next Visit   Visit date not found Danny Harrington MD   ED visits in past 90 days: 0     Note composed:11:35 AM 03/19/2022

## 2022-03-23 DIAGNOSIS — E10.9 CONTROLLED DIABETES MELLITUS TYPE 1 WITHOUT COMPLICATIONS: Primary | ICD-10-CM

## 2022-03-29 ENCOUNTER — CLINICAL SUPPORT (OUTPATIENT)
Dept: DIABETES | Facility: CLINIC | Age: 42
End: 2022-03-29
Payer: COMMERCIAL

## 2022-03-29 VITALS — BODY MASS INDEX: 34.68 KG/M2 | WEIGHT: 255.75 LBS

## 2022-03-29 DIAGNOSIS — E10.9 CONTROLLED DIABETES MELLITUS TYPE 1 WITHOUT COMPLICATIONS: ICD-10-CM

## 2022-03-29 PROCEDURE — 99999 PR PBB SHADOW E&M-EST. PATIENT-LVL II: ICD-10-PCS | Mod: PBBFAC,,,

## 2022-03-29 PROCEDURE — G0108 PR DIAB MANAGE TRN  PER INDIV: ICD-10-PCS | Mod: S$GLB,,, | Performed by: INTERNAL MEDICINE

## 2022-03-29 PROCEDURE — 99999 PR PBB SHADOW E&M-EST. PATIENT-LVL II: CPT | Mod: PBBFAC,,,

## 2022-03-29 PROCEDURE — G0108 DIAB MANAGE TRN  PER INDIV: HCPCS | Mod: S$GLB,,, | Performed by: INTERNAL MEDICINE

## 2022-03-29 NOTE — PROGRESS NOTES
Diabetes Care Specialist Progress Note  Author: Hue García RN, CDE  Date: 3/29/2022    Program Intake  Reason for Diabetes Program Visit:: Intervention  Type of Intervention:: Individual  Individual: Device Training  Device Training: Insulin Pump Upgrade (Medtronic to t slim training)  Current diabetes risk level:: moderate  In the last 12 months, have you:: none    Lab Results   Component Value Date    HGBA1C 6.2 (H) 02/22/2022     Additional Social History    Support  Does anyone support you with your diabetes care?: yes  Who supports you?: spouse  Who takes you to your medical appointments?: self  Does the current support meet the patient's needs?: Yes  Is Support an area impacting ability to self-manage diabetes?: No    Access to Mass Media & Technology  Does the patient have access to any of the following devices or technologies?: Smart phone, Tablet, Home computer, Internet Access  Media or technology needs impacting ability to self-manage diabetes?: No    Cognitive/Behavioral Health  Alert and Oriented: Yes  Difficulty Thinking: No  Requires Prompting: No  Requires assistance for routine expression?: No  Cognitive or behavioral barriers impacting ability to self-manage diabetes?: No    Communication  Language preference: English  Hearing Problems: No  Vision Problems: No    Health Literacy  Preferred Learning Method: Face to Face, Hands On, Reading Materials  How often do you need to have someone help you read instructions, pamphlets, or written material from your doctor or pharmacy?: Never  Health literacy needs impacting ability to self-manage diabetes?: No      Diabetes Self-Management Skills Assessment    Diabetes Disease Process/Treatment Options  Patient/caregiver able to state what happens when someone has diabetes.: yes  Patient/caregiver knows what type of diabetes they have.: yes  Diabetes Type : Type I  Diabetes Disease Process/Treatment Options: Skills Assessment Completed: Yes  Assessment  indicates:: Adequate understanding  Area of need?: No    Nutrition/Healthy Eating  Method of carbohydrate measurement:: carb counting/reading labels  Patient can identify foods that impact blood sugar.: yes  Patient-identified foods:: fruit/fruit juice, milk, soda, starchy vegetables (corn, peas, beans), starches (bread, pasta, rice, cereal), sweets, yogurt  Nutrition/Healthy Eating Skills Assessment Completed:: Yes  Assessment indicates:: Adequate understanding  Area of need?: No    Physical Activity/Exercise  Patient's daily activity level:: constantly moving  Patient formally exercises outside of work.: yes  Intensity: Low  Patient can identify reasons why exercise/physical activity is important in diabetes management.: yes  Identified reasons:: helps insulin work better  Physical Activity/Exercise Skills Assessment Completed: : Yes  Assessment indicates:: Adequate understanding  Area of need?: No    Medications  Patient is able to describe current diabetes management routine.: yes  Diabetes management routine:: insulin pump  Patient is able to identify current diabetes medications, dosages, and appropriate timing of medications.: yes  Patient understands the purpose of the medications taken for diabetes.: yes  Patient reports problems or concerns with current medication regimen.: no  Medication Skills Assessment Completed:: Yes  Assessment indicates:: Adequate understanding  Area of need?: No    Home Blood Glucose Monitoring  Patient states that blood sugar is checked at home daily.: yes  Monitoring Method:: personal continuous glucose monitor  Personal CGM type:: Dexcom G6  Patient is able to use personal CGM appropriately.: yes  CGM Report reviewed?: yes  Home Blood Glucose Monitoring Skills Assessment Completed: : Yes  Assessment indicates:: Adequate understanding  Area of need?: No    Acute Complications  Patient is able to identify types of acute complications: Yes  Patient Identified:: Hypoglycemia  Acute  Complications Skills Assessment Completed: : Yes  Assessment indicates:: Adequate understanding  Area of need?: No    Chronic Complications  Patient can identify major chronic complications of diabetes.: yes  Chronic Complications Skills Assessment Completed: : Yes  Assessment indicates:: Adequate understanding  Area of need?: No    Psychosocial/Coping  Patient can identify ways of coping with chronic disease.: yes  Patient-stated ways of coping with chronic disease:: support from loved ones  Psychosocial/Coping Skills Assessment Completed: : Yes  Assessment indicates:: Adequate understanding      Assessment Summary and Plan    Based on today's diabetes care assessment, the following areas of need were identified:      Social 3/29/2022   Support No   Access to [employer] No   Cognitive/Behavioral Health No   Health Literacy No              Diabetes Self-Management Skills 3/29/2022   Diabetes Disease Process/Treatment Options No   Nutrition/Healthy Eating No   Physical Activity/Exercise No   Medication No   Home Blood Glucose Monitoring No   Acute Complications No   Chronic Complications No          Today's interventions were provided through individual discussion, instruction, and written materials were provided.      Patient verbalized understanding of instruction and written materials.  Pt was able to return back demonstration of instructions today. Patient understood key points, needs reinforcement and further instruction.     Diabetes Self-Management Care Plan:    Today's Diabetes Self-Management Care Plan was developed with Thao's input. Thao has agreed to work toward the following goal(s) to improve his/her overall diabetes control.      Care Plan: Diabetes Management   Updates made since 2/27/2022 12:00 AM      Problem: Medications       Long-Range Goal: Patient Agrees to take Diabetes Medication(s) in insulin pump as prescribed.    Start Date: 3/29/2022   Priority: High   Barriers: No Barriers  Identified   Note:    TANDEM T-SLIM:X2 with Control IQ (Upgrade from previous pump)    Patient here today to upgrade pump software of her T-Slim:X2 insulin pump to include Dexcom G6 integration with Control IQ features. Pump training was provided per Tandem Training checklist. She currently transitioning from a [id number]     Details of Control IQ feature were covered with the patient:   - uses predicted CGM values to adjust delivery rates  - increases, decreases, or stops basal insulin delivery when sensor glucose predicted to be above/below pre-defined thresholds  - automatically delivers correction boluses up to once every 60 min     Reviewed all Control IQ icons and visual indicators including: control IQ zeferino icon, pump status icon, suspend bar, and activity icon.      Discussed activity features: sleep and exercise. Instructed how to set up sleep schedules based on current sleep habits.     Control IQ turned ON today in clinic.      Patient demonstrated the ability to fill and load t-slim cartridge, fill tubing, insert infusion set and fill canula adequately per sterile technique.  Patient inserted the infusion set with aseptic technique to abdomin.   Reviewed site selection and rotation. Change cartridge, infusion set, and site every three days.   Discussed storage of insulin and back-up plan if pump is broken or fails.     Reviewed  treatment of hypoglycemia and hyperglycemia, and troubleshooting of pump.     Programming insulin pump settings from previous pump [id number]  Dexcom transmitter ID [id number]  Code [id number] entered into pump.    INITIAL SETTINGS per current pump :     Basal rate: 12 am 0.8 units/hr; 7 am 1.2 units/hr; 9:30 am 1.15 units/hr; 8 pm 1.1 units/hr     Bolus settings :  ISF: 40  CHO RATIO: 8  Blood glucose goal: 110mg/dL  Active insulin time: 5 hrs     Max Bolus 20 units   Max basalP 3.00 units/hr or preset in pump to = twice the basal profile but will not exceed 15 units   Auto off: off      Tandem T-slim 24 hour support line provided.    T-slim mobile adrien was set up and pump.  Pump sync'ed with clinic account    Understanding was verbalized.          Task: Discussed guidelines for preventing, detecting and treating hypoglycemia and hyperglycemia and reviewed the importance of meal and medication timing with diabetes mediations for prevention of hypoglycemia and maximum drug benefit. Completed 3/29/2022          Follow Up Plan     Plan: review download on clinic account on Friday 4/1/2022    Today's care plan and follow up schedule was discussed with patient.  Thao verbalized understanding of the care plan, goals, and agrees to follow up plan.        The patient was encouraged to communicate with his/her health care provider/physician and care team regarding his/her condition(s) and treatment.  I provided the patient with my contact information today and encouraged to contact me via phone or [facility] Patient Portal as needed.     Length of Visit   Total Time: 90 Minutes

## 2022-04-20 DIAGNOSIS — Z12.31 OTHER SCREENING MAMMOGRAM: ICD-10-CM

## 2022-05-08 DIAGNOSIS — E03.9 HYPOTHYROIDISM (ACQUIRED): ICD-10-CM

## 2022-05-08 NOTE — TELEPHONE ENCOUNTER
No new care gaps identified.  [facility] Embedded Care Gaps. Reference number: [id number]. 5/08/2022   7:23:08 AM CDT

## 2022-05-09 RX ORDER — SERTRALINE HYDROCHLORIDE 50 MG/1
TABLET, FILM COATED ORAL
Qty: 90 TABLET | Refills: 0 | Status: SHIPPED | OUTPATIENT
Start: 2022-05-09 | End: 2022-05-23 | Stop reason: SDUPTHER

## 2022-05-09 RX ORDER — LEVOTHYROXINE SODIUM 137 UG/1
137 TABLET ORAL
Qty: 90 TABLET | Refills: 0 | Status: SHIPPED | OUTPATIENT
Start: 2022-05-09 | End: 2022-08-04

## 2022-05-09 NOTE — TELEPHONE ENCOUNTER
Refill Authorization Note   Thao Ybarra  is requesting a refill authorization.  Brief Assessment and Rationale for Refill:  Approve     Medication Therapy Plan:       Medication Reconciliation Completed: No   Comments:     No Care Gaps recommended.     Note composed:12:02 PM 05/09/2022

## 2022-05-19 ENCOUNTER — OFFICE VISIT (OUTPATIENT)
Dept: OBSTETRICS AND GYNECOLOGY | Facility: CLINIC | Age: 42
End: 2022-05-19
Payer: COMMERCIAL

## 2022-05-19 VITALS
DIASTOLIC BLOOD PRESSURE: 72 MMHG | SYSTOLIC BLOOD PRESSURE: 122 MMHG | HEIGHT: 72 IN | BODY MASS INDEX: 32.88 KG/M2 | WEIGHT: 242.75 LBS

## 2022-05-19 DIAGNOSIS — Z01.419 WELL WOMAN EXAM WITH ROUTINE GYNECOLOGICAL EXAM: Primary | ICD-10-CM

## 2022-05-19 DIAGNOSIS — Z80.0 FAMILY HISTORY OF COLON CANCER IN FATHER: ICD-10-CM

## 2022-05-19 PROCEDURE — 3044F PR MOST RECENT HEMOGLOBIN A1C LEVEL <7.0%: ICD-10-PCS | Mod: CPTII,S$GLB,, | Performed by: OBSTETRICS & GYNECOLOGY

## 2022-05-19 PROCEDURE — 3044F HG A1C LEVEL LT 7.0%: CPT | Mod: CPTII,S$GLB,, | Performed by: OBSTETRICS & GYNECOLOGY

## 2022-05-19 PROCEDURE — 3008F PR BODY MASS INDEX (BMI) DOCUMENTED: ICD-10-PCS | Mod: CPTII,S$GLB,, | Performed by: OBSTETRICS & GYNECOLOGY

## 2022-05-19 PROCEDURE — 3074F SYST BP LT 130 MM HG: CPT | Mod: CPTII,S$GLB,, | Performed by: OBSTETRICS & GYNECOLOGY

## 2022-05-19 PROCEDURE — 88175 CYTOPATH C/V AUTO FLUID REDO: CPT | Performed by: OBSTETRICS & GYNECOLOGY

## 2022-05-19 PROCEDURE — 3072F PR LOW RISK FOR RETINOPATHY: ICD-10-PCS | Mod: CPTII,S$GLB,, | Performed by: OBSTETRICS & GYNECOLOGY

## 2022-05-19 PROCEDURE — 87624 HPV HI-RISK TYP POOLED RSLT: CPT | Performed by: OBSTETRICS & GYNECOLOGY

## 2022-05-19 PROCEDURE — 3078F PR MOST RECENT DIASTOLIC BLOOD PRESSURE < 80 MM HG: ICD-10-PCS | Mod: CPTII,S$GLB,, | Performed by: OBSTETRICS & GYNECOLOGY

## 2022-05-19 PROCEDURE — 99999 PR PBB SHADOW E&M-EST. PATIENT-LVL III: ICD-10-PCS | Mod: PBBFAC,,, | Performed by: OBSTETRICS & GYNECOLOGY

## 2022-05-19 PROCEDURE — 1159F PR MEDICATION LIST DOCUMENTED IN MEDICAL RECORD: ICD-10-PCS | Mod: CPTII,S$GLB,, | Performed by: OBSTETRICS & GYNECOLOGY

## 2022-05-19 PROCEDURE — 1159F MED LIST DOCD IN RCRD: CPT | Mod: CPTII,S$GLB,, | Performed by: OBSTETRICS & GYNECOLOGY

## 2022-05-19 PROCEDURE — 3008F BODY MASS INDEX DOCD: CPT | Mod: CPTII,S$GLB,, | Performed by: OBSTETRICS & GYNECOLOGY

## 2022-05-19 PROCEDURE — 99999 PR PBB SHADOW E&M-EST. PATIENT-LVL III: CPT | Mod: PBBFAC,,, | Performed by: OBSTETRICS & GYNECOLOGY

## 2022-05-19 PROCEDURE — 3072F LOW RISK FOR RETINOPATHY: CPT | Mod: CPTII,S$GLB,, | Performed by: OBSTETRICS & GYNECOLOGY

## 2022-05-19 PROCEDURE — 3078F DIAST BP <80 MM HG: CPT | Mod: CPTII,S$GLB,, | Performed by: OBSTETRICS & GYNECOLOGY

## 2022-05-19 PROCEDURE — 3074F PR MOST RECENT SYSTOLIC BLOOD PRESSURE < 130 MM HG: ICD-10-PCS | Mod: CPTII,S$GLB,, | Performed by: OBSTETRICS & GYNECOLOGY

## 2022-05-19 PROCEDURE — 99386 PR PREVENTIVE VISIT,NEW,40-64: ICD-10-PCS | Mod: S$GLB,,, | Performed by: OBSTETRICS & GYNECOLOGY

## 2022-05-19 PROCEDURE — 99386 PREV VISIT NEW AGE 40-64: CPT | Mod: S$GLB,,, | Performed by: OBSTETRICS & GYNECOLOGY

## 2022-05-19 NOTE — PROGRESS NOTES
History & Physical  Gynecology      SUBJECTIVE:     Chief Complaint: Annual Exam       History of Present Illness:  Annual Exam-Premenopausal  Patient presents for annual exam. The patient has no complaints today. Menstrual cycles are monthly, normal.  The patient is sexually active. GYN screening history: last pap: approximate date 2022 and was normal. The patient participates in regular exercise: yes.  Smoking status:  no    Contraception: none; had to have IVF for pregnancy    FH:  Breast cancer: neg  Colon cancer: father- 70's  Ovarian cancer: neg    Review of patient's allergies indicates:  No Known Allergies    Past Medical History:   Diagnosis Date    Diabetes mellitus type I     Hypothyroidism      Past Surgical History:   Procedure Laterality Date     SECTION      x2     OB History        2    Para   2    Term   2            AB        Living           SAB        IAB        Ectopic        Multiple        Live Births                   Family History   Problem Relation Age of Onset    No Known Problems Mother     Cancer Father 79        colon CA    No Known Problems Brother     No Known Problems Daughter     No Known Problems Son     Diabetes Neg Hx     Ovarian cancer Neg Hx     Colon cancer Neg Hx     Breast cancer Neg Hx      Social History     Tobacco Use    Smoking status: Never Smoker    Smokeless tobacco: Never Used   Substance Use Topics    Alcohol use: No    Drug use: No       Current Outpatient Medications   Medication Sig    blood sugar diagnostic Strp To check BG 4 times daily, to use with insurance preferred meter.    blood-glucose meter (CONTOUR LINK MISC) by Misc.(Non-Drug; Combo Route) route.    blood-glucose sensor (DEXCOM G6 SENSOR) Crystal 1 Device by Misc.(Non-Drug; Combo Route) route every 10 days.    blood-glucose transmitter (DEXCOM G6 TRANSMITTER) Crystal 1 Device by Misc.(Non-Drug; Combo Route) route every 3 (three) months.    ciclopirox (PENLAC) 8  % Soln Apply topically nightly. Apply greater than 8 hr before washing; clean toenail with alcohol Q 7 days.  Maximum therapy of 48 weeks    insulin aspart U-100 (NOVOLOG U-100 INSULIN ASPART) 100 unit/mL injection For use in insulin pump, max  units per day    levothyroxine (SYNTHROID) 137 MCG Tab tablet TAKE 1 TABLET (137 MCG TOTAL) BY MOUTH BEFORE BREAKFAST.    rosuvastatin (CRESTOR) 5 MG tablet TAKE 1 TABLET BY MOUTH EVERY DAY    sertraline (ZOLOFT) 50 MG tablet TAKE 1 TABLET BY MOUTH EVERY DAY    glucagon, human recombinant, (GLUCAGON EMERGENCY KIT, HUMAN,) 1 mg SolR Inject 1 mg into the muscle as needed.    insulin (LANTUS SOLOSTAR U-100 INSULIN) glargine 100 units/mL (3mL) SubQ pen Inject 32 Units into the skin once daily. In case insulin pump fails     No current facility-administered medications for this visit.         Review of Systems:  Review of Systems   Constitutional: Negative for appetite change, fever and unexpected weight change.   Respiratory: Negative for shortness of breath.    Cardiovascular: Negative for chest pain.   Gastrointestinal: Negative for nausea and vomiting.   Genitourinary: Negative for menorrhagia, menstrual problem, pelvic pain, vaginal bleeding, vaginal discharge and vaginal pain.   Integumentary:  Negative for breast mass.   Breast: Negative for lump and mass       OBJECTIVE:     Physical Exam:  Physical Exam  Vitals and nursing note reviewed.   Constitutional:       Appearance: She is well-developed.   Neck:      Thyroid: No thyromegaly.      Trachea: No tracheal deviation.   Cardiovascular:      Rate and Rhythm: Normal rate and regular rhythm.      Heart sounds: Normal heart sounds.   Pulmonary:      Effort: Pulmonary effort is normal.      Breath sounds: Normal breath sounds.   Chest:   Breasts: Breasts are symmetrical.      Right: No inverted nipple, mass, nipple discharge, skin change or tenderness.      Left: No inverted nipple, mass, nipple discharge, skin  change or tenderness.       Abdominal:      Palpations: Abdomen is soft.   Genitourinary:     General: Normal vulva.      Labia:         Right: No rash, tenderness, lesion or injury.         Left: No rash, tenderness, lesion or injury.       Urethra: No prolapse, urethral pain, urethral swelling or urethral lesion.      Vagina: Normal. No signs of injury and foreign body. No vaginal discharge, erythema, tenderness or bleeding.      Cervix: No cervical motion tenderness, discharge or friability.      Uterus: Not deviated, not enlarged, not fixed and not tender.       Adnexa:         Right: No mass, tenderness or fullness.          Left: No mass, tenderness or fullness.        Rectum: No anal fissure or external hemorrhoid.      Comments: Urethral meatus: normal size, anterior vaginal wall with no prolapse, no lesions  Bladder: no fullness, masses or tenderness  Cervix: nabothian cyst present  Musculoskeletal:      Cervical back: Normal range of motion and neck supple.   Neurological:      Mental Status: She is alert and oriented to person, place, and time.   Psychiatric:         Behavior: Behavior normal.         Thought Content: Thought content normal.         Judgment: Judgment normal.         Chaperoned by: Sonali    ASSESSMENT:       ICD-10-CM ICD-9-CM    1. Well woman exam with routine gynecological exam  Z01.419 V72.31 Liquid-Based Pap Smear, Screening      HPV High Risk Genotypes, PCR   2. Family history of colon cancer in father  Z80.0 V16.0 Case Request Endoscopy: COLONOSCOPY          Plan:      Thao was seen today for annual exam.    Diagnoses and all orders for this visit:    Well woman exam with routine gynecological exam  -     Liquid-Based Pap Smear, Screening  -     HPV High Risk Genotypes, PCR    Family history of colon cancer in father  -     Case Request Endoscopy: COLONOSCOPY        Orders Placed This Encounter   Procedures    HPV High Risk Genotypes, PCR       Well Woman:  - Pap smear and hpv  today  - Birth control: none desired  - GC/CT:n/a  - Mammogram: scheduled  - Smoking cessation: n/a  - Labs: with pcp   - Vaccines: s/p hpv vaccine  - Exercise counseling  - recommended colonoscopy given FH; ordered, scheduled    Follow up in  one year for annual or prn.    Lara Cespedes

## 2022-05-23 ENCOUNTER — LAB VISIT (OUTPATIENT)
Dept: LAB | Facility: HOSPITAL | Age: 42
End: 2022-05-23
Attending: FAMILY MEDICINE
Payer: COMMERCIAL

## 2022-05-23 ENCOUNTER — OFFICE VISIT (OUTPATIENT)
Dept: PRIMARY CARE CLINIC | Facility: CLINIC | Age: 42
End: 2022-05-23
Payer: COMMERCIAL

## 2022-05-23 VITALS
TEMPERATURE: 98 F | OXYGEN SATURATION: 98 % | DIASTOLIC BLOOD PRESSURE: 68 MMHG | HEIGHT: 72 IN | HEART RATE: 65 BPM | WEIGHT: 246.5 LBS | SYSTOLIC BLOOD PRESSURE: 118 MMHG | BODY MASS INDEX: 33.39 KG/M2

## 2022-05-23 DIAGNOSIS — Z96.41 INSULIN PUMP STATUS: ICD-10-CM

## 2022-05-23 DIAGNOSIS — E55.9 VITAMIN D DEFICIENCY: ICD-10-CM

## 2022-05-23 DIAGNOSIS — Z12.83 SKIN CANCER SCREENING: ICD-10-CM

## 2022-05-23 DIAGNOSIS — Z00.00 GENERAL MEDICAL EXAM: ICD-10-CM

## 2022-05-23 DIAGNOSIS — E03.9 HYPOTHYROIDISM (ACQUIRED): ICD-10-CM

## 2022-05-23 DIAGNOSIS — Z00.00 GENERAL MEDICAL EXAM: Primary | ICD-10-CM

## 2022-05-23 DIAGNOSIS — E10.9 CONTROLLED DIABETES MELLITUS TYPE 1 WITHOUT COMPLICATIONS: ICD-10-CM

## 2022-05-23 LAB
25(OH)D3+25(OH)D2 SERPL-MCNC: 13 NG/ML (ref 30–96)
ALBUMIN SERPL BCP-MCNC: 3.7 G/DL (ref 3.5–5.2)
ALP SERPL-CCNC: 90 U/L (ref 55–135)
ALT SERPL W/O P-5'-P-CCNC: 214 U/L (ref 10–44)
ANION GAP SERPL CALC-SCNC: 9 MMOL/L (ref 8–16)
AST SERPL-CCNC: 165 U/L (ref 10–40)
BILIRUB SERPL-MCNC: 0.5 MG/DL (ref 0.1–1)
BUN SERPL-MCNC: 8 MG/DL (ref 6–20)
CALCIUM SERPL-MCNC: 9.3 MG/DL (ref 8.7–10.5)
CHLORIDE SERPL-SCNC: 108 MMOL/L (ref 95–110)
CO2 SERPL-SCNC: 26 MMOL/L (ref 23–29)
CREAT SERPL-MCNC: 0.7 MG/DL (ref 0.5–1.4)
EST. GFR  (AFRICAN AMERICAN): >60 ML/MIN/1.73 M^2
EST. GFR  (NON AFRICAN AMERICAN): >60 ML/MIN/1.73 M^2
ESTIMATED AVG GLUCOSE: 123 MG/DL (ref 68–131)
FERRITIN SERPL-MCNC: 10 NG/ML (ref 20–300)
FOLATE SERPL-MCNC: 6.4 NG/ML (ref 4–24)
GLUCOSE SERPL-MCNC: 61 MG/DL (ref 70–110)
HBA1C MFR BLD: 5.9 % (ref 4–5.6)
HPV HR 12 DNA SPEC QL NAA+PROBE: NEGATIVE
HPV16 AG SPEC QL: NEGATIVE
HPV18 DNA SPEC QL NAA+PROBE: NEGATIVE
IRON SERPL-MCNC: 16 UG/DL (ref 30–160)
POTASSIUM SERPL-SCNC: 4.7 MMOL/L (ref 3.5–5.1)
PROT SERPL-MCNC: 6.5 G/DL (ref 6–8.4)
SATURATED IRON: 4 % (ref 20–50)
SODIUM SERPL-SCNC: 143 MMOL/L (ref 136–145)
TOTAL IRON BINDING CAPACITY: 450 UG/DL (ref 250–450)
TRANSFERRIN SERPL-MCNC: 304 MG/DL (ref 200–375)
VIT B12 SERPL-MCNC: 1722 PG/ML (ref 210–950)

## 2022-05-23 PROCEDURE — 1160F PR REVIEW ALL MEDS BY PRESCRIBER/CLIN PHARMACIST DOCUMENTED: ICD-10-PCS | Mod: CPTII,S$GLB,, | Performed by: FAMILY MEDICINE

## 2022-05-23 PROCEDURE — 80053 COMPREHEN METABOLIC PANEL: CPT | Performed by: FAMILY MEDICINE

## 2022-05-23 PROCEDURE — 1159F PR MEDICATION LIST DOCUMENTED IN MEDICAL RECORD: ICD-10-PCS | Mod: CPTII,S$GLB,, | Performed by: FAMILY MEDICINE

## 2022-05-23 PROCEDURE — 99999 PR PBB SHADOW E&M-EST. PATIENT-LVL IV: CPT | Mod: PBBFAC,,, | Performed by: FAMILY MEDICINE

## 2022-05-23 PROCEDURE — 82306 VITAMIN D 25 HYDROXY: CPT | Performed by: FAMILY MEDICINE

## 2022-05-23 PROCEDURE — 3044F PR MOST RECENT HEMOGLOBIN A1C LEVEL <7.0%: ICD-10-PCS | Mod: CPTII,S$GLB,, | Performed by: FAMILY MEDICINE

## 2022-05-23 PROCEDURE — 3078F DIAST BP <80 MM HG: CPT | Mod: CPTII,S$GLB,, | Performed by: FAMILY MEDICINE

## 2022-05-23 PROCEDURE — 84466 ASSAY OF TRANSFERRIN: CPT | Performed by: FAMILY MEDICINE

## 2022-05-23 PROCEDURE — 82607 VITAMIN B-12: CPT | Performed by: FAMILY MEDICINE

## 2022-05-23 PROCEDURE — 82728 ASSAY OF FERRITIN: CPT | Performed by: FAMILY MEDICINE

## 2022-05-23 PROCEDURE — 3074F SYST BP LT 130 MM HG: CPT | Mod: CPTII,S$GLB,, | Performed by: FAMILY MEDICINE

## 2022-05-23 PROCEDURE — 3008F BODY MASS INDEX DOCD: CPT | Mod: CPTII,S$GLB,, | Performed by: FAMILY MEDICINE

## 2022-05-23 PROCEDURE — 85025 COMPLETE CBC W/AUTO DIFF WBC: CPT | Performed by: FAMILY MEDICINE

## 2022-05-23 PROCEDURE — 3072F PR LOW RISK FOR RETINOPATHY: ICD-10-PCS | Mod: CPTII,S$GLB,, | Performed by: FAMILY MEDICINE

## 2022-05-23 PROCEDURE — 3072F LOW RISK FOR RETINOPATHY: CPT | Mod: CPTII,S$GLB,, | Performed by: FAMILY MEDICINE

## 2022-05-23 PROCEDURE — 99999 PR PBB SHADOW E&M-EST. PATIENT-LVL IV: ICD-10-PCS | Mod: PBBFAC,,, | Performed by: FAMILY MEDICINE

## 2022-05-23 PROCEDURE — 1160F RVW MEDS BY RX/DR IN RCRD: CPT | Mod: CPTII,S$GLB,, | Performed by: FAMILY MEDICINE

## 2022-05-23 PROCEDURE — 1159F MED LIST DOCD IN RCRD: CPT | Mod: CPTII,S$GLB,, | Performed by: FAMILY MEDICINE

## 2022-05-23 PROCEDURE — 3074F PR MOST RECENT SYSTOLIC BLOOD PRESSURE < 130 MM HG: ICD-10-PCS | Mod: CPTII,S$GLB,, | Performed by: FAMILY MEDICINE

## 2022-05-23 PROCEDURE — 83036 HEMOGLOBIN GLYCOSYLATED A1C: CPT | Performed by: FAMILY MEDICINE

## 2022-05-23 PROCEDURE — 99396 PR PREVENTIVE VISIT,EST,40-64: ICD-10-PCS | Mod: S$GLB,,, | Performed by: FAMILY MEDICINE

## 2022-05-23 PROCEDURE — 36415 COLL VENOUS BLD VENIPUNCTURE: CPT | Mod: PN | Performed by: FAMILY MEDICINE

## 2022-05-23 PROCEDURE — 99396 PREV VISIT EST AGE 40-64: CPT | Mod: S$GLB,,, | Performed by: FAMILY MEDICINE

## 2022-05-23 PROCEDURE — 3044F HG A1C LEVEL LT 7.0%: CPT | Mod: CPTII,S$GLB,, | Performed by: FAMILY MEDICINE

## 2022-05-23 PROCEDURE — 3008F PR BODY MASS INDEX (BMI) DOCUMENTED: ICD-10-PCS | Mod: CPTII,S$GLB,, | Performed by: FAMILY MEDICINE

## 2022-05-23 PROCEDURE — 82746 ASSAY OF FOLIC ACID SERUM: CPT | Performed by: FAMILY MEDICINE

## 2022-05-23 PROCEDURE — 3078F PR MOST RECENT DIASTOLIC BLOOD PRESSURE < 80 MM HG: ICD-10-PCS | Mod: CPTII,S$GLB,, | Performed by: FAMILY MEDICINE

## 2022-05-23 RX ORDER — ROSUVASTATIN CALCIUM 5 MG/1
5 TABLET, COATED ORAL DAILY
Qty: 90 TABLET | Refills: 3 | Status: SHIPPED | OUTPATIENT
Start: 2022-05-23 | End: 2023-05-22

## 2022-05-23 RX ORDER — SERTRALINE HYDROCHLORIDE 50 MG/1
50 TABLET, FILM COATED ORAL DAILY
Qty: 90 TABLET | Refills: 3 | Status: SHIPPED | OUTPATIENT
Start: 2022-05-23 | End: 2023-07-06 | Stop reason: SDUPTHER

## 2022-05-23 NOTE — PROGRESS NOTES
Subjective:   Patient ID: Thao Ybarra is a 41 y.o. female.    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  Has an insulin pump      Of note, regarding family history--  Dad with colon ca at age 70    Patient without complaints today      Patient queried and denies any further complaints.    ALLERGIES AND MEDICATIONS: updated and reviewed.  Review of patient's allergies indicates:  No Known Allergies    Current Outpatient Medications:     blood sugar diagnostic Strp, To check BG 4 times daily, to use with insurance preferred meter., Disp: 200 strip, Rfl: 3    blood-glucose meter (CONTOUR LINK MISC), by Misc.(Non-Drug; Combo Route) route., Disp: , Rfl:     blood-glucose sensor (DEXCOM G6 SENSOR) Crystal, 1 Device by Misc.(Non-Drug; Combo Route) route every 10 days., Disp: 9 each, Rfl: 3    blood-glucose transmitter (DEXCOM G6 TRANSMITTER) Crystal, 1 Device by Misc.(Non-Drug; Combo Route) route every 3 (three) months., Disp: 1 each, Rfl: 3    insulin (LANTUS SOLOSTAR U-100 INSULIN) glargine 100 units/mL (3mL) SubQ pen, Inject 32 Units into the skin once daily. In case insulin pump fails, Disp: 3 mL, Rfl: 1    insulin aspart U-100 (NOVOLOG U-100 INSULIN ASPART) 100 unit/mL injection, For use in insulin pump, max  units per day, Disp: 10 each, Rfl: 3    levothyroxine (SYNTHROID) 137 MCG Tab tablet, TAKE 1 TABLET (137 MCG TOTAL) BY MOUTH BEFORE BREAKFAST., Disp: 90 tablet, Rfl: 0    glucagon, human recombinant, (GLUCAGON EMERGENCY KIT, HUMAN,) 1 mg SolR, Inject 1 mg into the muscle as needed., Disp: 1 each, Rfl: 1    rosuvastatin (CRESTOR) 5 MG tablet, Take 1 tablet (5 mg total) by mouth once daily., Disp: 90 tablet, Rfl: 3    sertraline (ZOLOFT) 50 MG tablet, Take 1 tablet (50 mg total) by mouth once daily., Disp: 90 tablet, Rfl: 3    Review of Systems    Constitutional: Negative for activity change, appetite change, chills, diaphoresis, fatigue, fever and unexpected weight change.   HENT: Negative for congestion, ear discharge, ear pain, facial swelling, hearing loss, nosebleeds, postnasal drip, rhinorrhea, sinus pressure, sneezing, sore throat, tinnitus, trouble swallowing and voice change.    Eyes: Negative for photophobia, pain, discharge, redness, itching and visual disturbance.   Respiratory: Negative for cough, chest tightness, shortness of breath and wheezing.    Cardiovascular: Negative for chest pain, palpitations and leg swelling.   Gastrointestinal: Negative for abdominal distention, abdominal pain, anal bleeding, blood in stool, constipation, diarrhea, nausea, rectal pain and vomiting.   Endocrine: Negative for cold intolerance, heat intolerance, polydipsia, polyphagia and polyuria.   Genitourinary: Negative for difficulty urinating, dysuria and flank pain.   Musculoskeletal: Negative for arthralgias, back pain, joint swelling, myalgias and neck pain.   Skin: Negative for rash.   Neurological: Negative for dizziness, tremors, seizures, syncope, speech difficulty, weakness, light-headedness, numbness and headaches.   Psychiatric/Behavioral: Negative for behavioral problems, confusion, decreased concentration, dysphoric mood, sleep disturbance and suicidal ideas. The patient is not nervous/anxious and is not hyperactive.        Lab Results   Component Value Date    WBC 4.48 05/23/2022    HGB 10.3 (L) 05/23/2022    HCT 37.8 05/23/2022    MCV 83 05/23/2022     05/23/2022         CMP  Sodium   Date Value Ref Range Status   05/23/2022 143 136 - 145 mmol/L Final     Potassium   Date Value Ref Range Status   05/23/2022 4.7 3.5 - 5.1 mmol/L Final     Chloride   Date Value Ref Range Status   05/23/2022 108 95 - 110 mmol/L Final     CO2   Date Value Ref Range Status   05/23/2022 26 23 - 29 mmol/L Final     Glucose   Date Value Ref Range Status   05/23/2022  61 (L) 70 - 110 mg/dL Final     BUN   Date Value Ref Range Status   05/23/2022 8 6 - 20 mg/dL Final     Creatinine   Date Value Ref Range Status   05/23/2022 0.7 0.5 - 1.4 mg/dL Final     Calcium   Date Value Ref Range Status   05/23/2022 9.3 8.7 - 10.5 mg/dL Final     Total Protein   Date Value Ref Range Status   05/25/2022 7.1 6.0 - 8.4 g/dL Final     Albumin   Date Value Ref Range Status   05/25/2022 3.8 3.5 - 5.2 g/dL Final     Total Bilirubin   Date Value Ref Range Status   05/25/2022 0.9 0.1 - 1.0 mg/dL Final     Comment:     For infants and newborns, interpretation of results should be based  on gestational age, weight and in agreement with clinical  observations.    Premature Infant recommended reference ranges:  Up to 24 hours.............<8.0 mg/dL  Up to 48 hours............<12.0 mg/dL  3-5 days..................<15.0 mg/dL  6-29 days.................<15.0 mg/dL       Alkaline Phosphatase   Date Value Ref Range Status   05/25/2022 98 55 - 135 U/L Final     AST   Date Value Ref Range Status   05/25/2022 215 (H) 10 - 40 U/L Final     ALT   Date Value Ref Range Status   05/25/2022 246 (H) 10 - 44 U/L Final     Anion Gap   Date Value Ref Range Status   05/23/2022 9 8 - 16 mmol/L Final     eGFR if    Date Value Ref Range Status   05/23/2022 >60.0 >60 mL/min/1.73 m^2 Final     eGFR if non    Date Value Ref Range Status   05/23/2022 >60.0 >60 mL/min/1.73 m^2 Final     Comment:     Calculation used to obtain the estimated glomerular filtration  rate (eGFR) is the CKD-EPI equation.           Lab Results   Component Value Date    HGBA1C 5.9 (H) 05/23/2022        Objective:     Vitals:    05/23/22 1412   BP: 118/68   Pulse: 65   Temp: 98.1 °F (36.7 °C)   TempSrc: Oral   SpO2: 98%   Weight: 111.8 kg (246 lb 7.6 oz)   Height: 6' (1.829 m)   PainSc: 0-No pain     Body mass index is 33.43 kg/m².    Physical Exam  Vitals and nursing note reviewed.   Constitutional:       General: She is  not in acute distress.     Appearance: Normal appearance. She is well-developed. She is not toxic-appearing or diaphoretic.   HENT:      Head: Normocephalic and atraumatic.      Right Ear: Hearing, tympanic membrane, ear canal and external ear normal. No tenderness.      Left Ear: Hearing, tympanic membrane, ear canal and external ear normal. No tenderness.   Eyes:      General: Lids are normal. No scleral icterus.        Right eye: No discharge.         Left eye: No discharge.      Extraocular Movements:      Right eye: Normal extraocular motion.      Left eye: Normal extraocular motion.      Conjunctiva/sclera: Conjunctivae normal.      Right eye: Right conjunctiva is not injected.      Left eye: Left conjunctiva is not injected.   Neck:      Thyroid: No thyromegaly.      Vascular: No carotid bruit or JVD.      Trachea: No tracheal deviation.   Cardiovascular:      Rate and Rhythm: Normal rate and regular rhythm.      Pulses: Normal pulses.      Heart sounds: Normal heart sounds. No murmur heard.    No friction rub.   Pulmonary:      Effort: Pulmonary effort is normal. No accessory muscle usage or respiratory distress.      Breath sounds: Normal breath sounds. No wheezing, rhonchi or rales.   Abdominal:      General: Bowel sounds are normal. There is no distension or abdominal bruit.      Palpations: Abdomen is soft. There is no mass or pulsatile mass.      Tenderness: There is no abdominal tenderness. There is no guarding or rebound. Negative signs include Jackson's sign and McBurney's sign.   Musculoskeletal:      Cervical back: Normal range of motion and neck supple. No edema.   Lymphadenopathy:      Head:      Right side of head: No submandibular, preauricular or posterior auricular adenopathy.      Left side of head: No submandibular, preauricular or posterior auricular adenopathy.      Cervical: No cervical adenopathy.   Skin:     General: Skin is warm and dry.      Findings: No ecchymosis or erythema. Rash  is not urticarial.      Nails: There is no clubbing.   Neurological:      Mental Status: She is alert.      GCS: GCS eye subscore is 4. GCS verbal subscore is 5. GCS motor subscore is 6.   Psychiatric:         Mood and Affect: Mood normal. Mood is not anxious or depressed. Affect is not angry or inappropriate.         Speech: Speech normal.         Behavior: Behavior normal. Behavior is cooperative.         Thought Content: Thought content normal.         Assessment and Plan:   Thao was seen today for annual exam.    Diagnoses and all orders for this visit:    General medical exam  -     CBC Auto Differential; Future  -     Folate; Future  -     Ferritin; Future  -     Iron and TIBC; Future  -     Vitamin B12; Future  -     Hemoglobin A1C; Future  -     Comprehensive Metabolic Panel; Future    Skin cancer screening  -     Ambulatory referral/consult to Dermatology; Future    Vitamin D deficiency  -     Vitamin D; Future    Insulin pump status    Hypothyroidism (acquired)    Controlled diabetes mellitus type 1 without complications    BMI 33.0-33.9,adult    Other orders  -     sertraline (ZOLOFT) 50 MG tablet; Take 1 tablet (50 mg total) by mouth once daily.  -     rosuvastatin (CRESTOR) 5 MG tablet; Take 1 tablet (5 mg total) by mouth once daily.        No follow-ups on file.    THIS NOTE WILL BE SHARED WITH THE PATIENT.

## 2022-05-24 ENCOUNTER — PATIENT MESSAGE (OUTPATIENT)
Dept: PRIMARY CARE CLINIC | Facility: CLINIC | Age: 42
End: 2022-05-24
Payer: COMMERCIAL

## 2022-05-24 DIAGNOSIS — R79.89 ELEVATED LFTS: Primary | ICD-10-CM

## 2022-05-24 LAB
BASOPHILS # BLD AUTO: 0.04 K/UL (ref 0–0.2)
BASOPHILS NFR BLD: 0.9 % (ref 0–1.9)
DIFFERENTIAL METHOD: ABNORMAL
EOSINOPHIL # BLD AUTO: 0.1 K/UL (ref 0–0.5)
EOSINOPHIL NFR BLD: 1.8 % (ref 0–8)
ERYTHROCYTE [DISTWIDTH] IN BLOOD BY AUTOMATED COUNT: 16 % (ref 11.5–14.5)
FINAL PATHOLOGIC DIAGNOSIS: NORMAL
HCT VFR BLD AUTO: 37.8 % (ref 37–48.5)
HGB BLD-MCNC: 10.3 G/DL (ref 12–16)
IMM GRANULOCYTES # BLD AUTO: 0 K/UL (ref 0–0.04)
IMM GRANULOCYTES NFR BLD AUTO: 0 % (ref 0–0.5)
LYMPHOCYTES # BLD AUTO: 1.5 K/UL (ref 1–4.8)
LYMPHOCYTES NFR BLD: 32.4 % (ref 18–48)
Lab: NORMAL
MCH RBC QN AUTO: 22.7 PG (ref 27–31)
MCHC RBC AUTO-ENTMCNC: 27.2 G/DL (ref 32–36)
MCV RBC AUTO: 83 FL (ref 82–98)
MONOCYTES # BLD AUTO: 0.5 K/UL (ref 0.3–1)
MONOCYTES NFR BLD: 10.9 % (ref 4–15)
NEUTROPHILS # BLD AUTO: 2.4 K/UL (ref 1.8–7.7)
NEUTROPHILS NFR BLD: 54 % (ref 38–73)
NRBC BLD-RTO: 0 /100 WBC
PLATELET # BLD AUTO: 172 K/UL (ref 150–450)
PMV BLD AUTO: ABNORMAL FL (ref 9.2–12.9)
RBC # BLD AUTO: 4.54 M/UL (ref 4–5.4)
WBC # BLD AUTO: 4.48 K/UL (ref 3.9–12.7)

## 2022-05-25 ENCOUNTER — LAB VISIT (OUTPATIENT)
Dept: LAB | Facility: HOSPITAL | Age: 42
End: 2022-05-25
Attending: FAMILY MEDICINE
Payer: COMMERCIAL

## 2022-05-25 DIAGNOSIS — R79.89 ELEVATED LFTS: ICD-10-CM

## 2022-05-25 LAB
ALBUMIN SERPL BCP-MCNC: 3.8 G/DL (ref 3.5–5.2)
ALP SERPL-CCNC: 98 U/L (ref 55–135)
ALT SERPL W/O P-5'-P-CCNC: 246 U/L (ref 10–44)
AST SERPL-CCNC: 215 U/L (ref 10–40)
BILIRUB DIRECT SERPL-MCNC: 0.4 MG/DL (ref 0.1–0.3)
BILIRUB SERPL-MCNC: 0.9 MG/DL (ref 0.1–1)
PROT SERPL-MCNC: 7.1 G/DL (ref 6–8.4)

## 2022-05-25 PROCEDURE — 86790 VIRUS ANTIBODY NOS: CPT | Performed by: FAMILY MEDICINE

## 2022-05-25 PROCEDURE — 86709 HEPATITIS A IGM ANTIBODY: CPT | Performed by: FAMILY MEDICINE

## 2022-05-25 PROCEDURE — 87340 HEPATITIS B SURFACE AG IA: CPT | Performed by: FAMILY MEDICINE

## 2022-05-25 PROCEDURE — 86706 HEP B SURFACE ANTIBODY: CPT | Performed by: FAMILY MEDICINE

## 2022-05-25 PROCEDURE — 86803 HEPATITIS C AB TEST: CPT | Performed by: FAMILY MEDICINE

## 2022-05-25 PROCEDURE — 86704 HEP B CORE ANTIBODY TOTAL: CPT | Performed by: FAMILY MEDICINE

## 2022-05-25 PROCEDURE — 36415 COLL VENOUS BLD VENIPUNCTURE: CPT | Performed by: FAMILY MEDICINE

## 2022-05-25 PROCEDURE — 80076 HEPATIC FUNCTION PANEL: CPT | Performed by: FAMILY MEDICINE

## 2022-05-26 LAB
HAV IGM SERPL QL IA: NEGATIVE
HBV CORE AB SERPL QL IA: NEGATIVE
HBV SURFACE AB SER-ACNC: NORMAL M[IU]/ML
HBV SURFACE AG SERPL QL IA: NEGATIVE
HCV AB SERPL QL IA: NEGATIVE

## 2022-05-27 PROBLEM — E55.9 VITAMIN D DEFICIENCY: Status: ACTIVE | Noted: 2022-05-27

## 2022-05-30 ENCOUNTER — PATIENT MESSAGE (OUTPATIENT)
Dept: ADMINISTRATIVE | Facility: HOSPITAL | Age: 42
End: 2022-05-30
Payer: COMMERCIAL

## 2022-06-01 LAB — HEV IGM SER QL: NOT DETECTED

## 2022-06-03 DIAGNOSIS — E55.9 VITAMIN D DEFICIENCY: Primary | ICD-10-CM

## 2022-06-03 DIAGNOSIS — R79.89 ELEVATED LFTS: ICD-10-CM

## 2022-06-03 RX ORDER — ERGOCALCIFEROL 1.25 MG/1
50000 CAPSULE ORAL
Qty: 8 CAPSULE | Refills: 0 | Status: SHIPPED | OUTPATIENT
Start: 2022-06-03 | End: 2022-07-23

## 2022-06-06 ENCOUNTER — APPOINTMENT (OUTPATIENT)
Dept: RADIOLOGY | Facility: OTHER | Age: 42
End: 2022-06-06
Attending: FAMILY MEDICINE
Payer: COMMERCIAL

## 2022-06-06 VITALS — WEIGHT: 246.5 LBS | HEIGHT: 72 IN | BODY MASS INDEX: 33.39 KG/M2

## 2022-06-06 DIAGNOSIS — Z12.31 OTHER SCREENING MAMMOGRAM: ICD-10-CM

## 2022-06-06 PROCEDURE — 77063 BREAST TOMOSYNTHESIS BI: CPT | Mod: TC,PN

## 2022-06-06 PROCEDURE — 77067 SCR MAMMO BI INCL CAD: CPT | Mod: 26,,, | Performed by: INTERNAL MEDICINE

## 2022-06-06 PROCEDURE — 77063 MAMMO DIGITAL SCREENING BILAT WITH TOMO: ICD-10-PCS | Mod: 26,,, | Performed by: INTERNAL MEDICINE

## 2022-06-06 PROCEDURE — 77063 BREAST TOMOSYNTHESIS BI: CPT | Mod: 26,,, | Performed by: INTERNAL MEDICINE

## 2022-06-06 PROCEDURE — 77067 MAMMO DIGITAL SCREENING BILAT WITH TOMO: ICD-10-PCS | Mod: 26,,, | Performed by: INTERNAL MEDICINE

## 2022-06-06 RX ORDER — ERGOCALCIFEROL 1.25 MG/1
50000 CAPSULE ORAL
Qty: 8 CAPSULE | Refills: 0 | Status: SHIPPED | OUTPATIENT
Start: 2022-06-06 | End: 2023-07-06

## 2022-06-14 ENCOUNTER — LAB VISIT (OUTPATIENT)
Dept: LAB | Facility: HOSPITAL | Age: 42
End: 2022-06-14
Attending: FAMILY MEDICINE
Payer: COMMERCIAL

## 2022-06-14 DIAGNOSIS — R79.89 ELEVATED LFTS: ICD-10-CM

## 2022-06-14 LAB
ALBUMIN SERPL BCP-MCNC: 3.6 G/DL (ref 3.5–5.2)
ALP SERPL-CCNC: 74 U/L (ref 55–135)
ALT SERPL W/O P-5'-P-CCNC: 32 U/L (ref 10–44)
ANION GAP SERPL CALC-SCNC: 9 MMOL/L (ref 8–16)
AST SERPL-CCNC: 23 U/L (ref 10–40)
BILIRUB SERPL-MCNC: 0.8 MG/DL (ref 0.1–1)
BUN SERPL-MCNC: 10 MG/DL (ref 6–20)
CALCIUM SERPL-MCNC: 9.2 MG/DL (ref 8.7–10.5)
CHLORIDE SERPL-SCNC: 103 MMOL/L (ref 95–110)
CO2 SERPL-SCNC: 26 MMOL/L (ref 23–29)
CREAT SERPL-MCNC: 0.7 MG/DL (ref 0.5–1.4)
EST. GFR  (AFRICAN AMERICAN): >60 ML/MIN/1.73 M^2
EST. GFR  (NON AFRICAN AMERICAN): >60 ML/MIN/1.73 M^2
GLUCOSE SERPL-MCNC: 146 MG/DL (ref 70–110)
POTASSIUM SERPL-SCNC: 3.9 MMOL/L (ref 3.5–5.1)
PROT SERPL-MCNC: 6.4 G/DL (ref 6–8.4)
SODIUM SERPL-SCNC: 138 MMOL/L (ref 136–145)

## 2022-06-14 PROCEDURE — 36415 COLL VENOUS BLD VENIPUNCTURE: CPT | Performed by: FAMILY MEDICINE

## 2022-06-14 PROCEDURE — 80053 COMPREHEN METABOLIC PANEL: CPT | Performed by: FAMILY MEDICINE

## 2022-08-04 DIAGNOSIS — E03.9 HYPOTHYROIDISM (ACQUIRED): ICD-10-CM

## 2022-08-04 RX ORDER — LEVOTHYROXINE SODIUM 137 UG/1
TABLET ORAL
Qty: 90 TABLET | Refills: 2 | Status: SHIPPED | OUTPATIENT
Start: 2022-08-04 | End: 2023-04-28

## 2022-08-04 NOTE — TELEPHONE ENCOUNTER
Refill Decision Note   Thao Ybarra  is requesting a refill authorization.  Brief Assessment and Rationale for Refill:  Approve     Medication Therapy Plan:       Medication Reconciliation Completed: No   Comments:     No Care Gaps recommended.     Note composed:12:40 PM 08/04/2022

## 2022-08-04 NOTE — TELEPHONE ENCOUNTER
No new care gaps identified.  [facility] Embedded Care Gaps. Reference number: [id number]. 8/04/2022   12:05:12 AM CDT

## 2022-08-18 ENCOUNTER — TELEPHONE (OUTPATIENT)
Dept: ENDOSCOPY | Facility: HOSPITAL | Age: 42
End: 2022-08-18
Payer: COMMERCIAL

## 2022-08-18 NOTE — TELEPHONE ENCOUNTER
Called pt to schedule colonoscopy, no answer. Left voicemail for pt to call the Endoscopy Scheduling Dept. [phone]

## 2022-08-19 ENCOUNTER — PATIENT MESSAGE (OUTPATIENT)
Dept: ENDOSCOPY | Facility: HOSPITAL | Age: 42
End: 2022-08-19
Payer: COMMERCIAL

## 2022-08-19 DIAGNOSIS — Z12.11 SPECIAL SCREENING FOR MALIGNANT NEOPLASMS, COLON: Primary | ICD-10-CM

## 2022-08-19 RX ORDER — POLYETHYLENE GLYCOL 3350, SODIUM SULFATE ANHYDROUS, SODIUM BICARBONATE, SODIUM CHLORIDE, POTASSIUM CHLORIDE 236; 22.74; 6.74; 5.86; 2.97 G/4L; G/4L; G/4L; G/4L; G/4L
4 POWDER, FOR SOLUTION ORAL ONCE
Qty: 4000 ML | Refills: 0 | Status: SHIPPED | OUTPATIENT
Start: 2022-08-19 | End: 2022-08-19

## 2022-08-21 DIAGNOSIS — E55.9 VITAMIN D DEFICIENCY, UNSPECIFIED: ICD-10-CM

## 2022-08-22 RX ORDER — ERGOCALCIFEROL 1.25 MG/1
CAPSULE ORAL
Qty: 8 CAPSULE | Refills: 0 | OUTPATIENT
Start: 2022-08-22

## 2022-09-19 ENCOUNTER — PATIENT MESSAGE (OUTPATIENT)
Dept: RESEARCH | Facility: HOSPITAL | Age: 42
End: 2022-09-19
Payer: COMMERCIAL

## 2022-09-26 ENCOUNTER — ANESTHESIA (OUTPATIENT)
Dept: ENDOSCOPY | Facility: HOSPITAL | Age: 42
End: 2022-09-26
Payer: COMMERCIAL

## 2022-09-26 ENCOUNTER — HOSPITAL ENCOUNTER (OUTPATIENT)
Facility: HOSPITAL | Age: 42
Discharge: HOME | End: 2022-09-26
Attending: INTERNAL MEDICINE | Admitting: INTERNAL MEDICINE
Payer: COMMERCIAL

## 2022-09-26 ENCOUNTER — ANESTHESIA EVENT (OUTPATIENT)
Dept: ENDOSCOPY | Facility: HOSPITAL | Age: 42
End: 2022-09-26

## 2022-09-26 VITALS
SYSTOLIC BLOOD PRESSURE: 116 MMHG | WEIGHT: 220 LBS | BODY MASS INDEX: 29.8 KG/M2 | OXYGEN SATURATION: 96 % | HEIGHT: 72 IN | HEART RATE: 57 BPM | RESPIRATION RATE: 16 BRPM | TEMPERATURE: 98 F | DIASTOLIC BLOOD PRESSURE: 69 MMHG

## 2022-09-26 DIAGNOSIS — Z80.0 FAMILY HISTORY OF COLON CANCER: ICD-10-CM

## 2022-09-26 DIAGNOSIS — Z12.11 COLON CANCER SCREENING: Primary | ICD-10-CM

## 2022-09-26 LAB
B-HCG UR QL: NEGATIVE
CTP QC/QA: YES

## 2022-09-26 PROCEDURE — 63600175 PHARM REV CODE 636 W HCPCS: Performed by: NURSE ANESTHETIST, CERTIFIED REGISTERED

## 2022-09-26 PROCEDURE — 25000003 PHARM REV CODE 250: Performed by: NURSE ANESTHETIST, CERTIFIED REGISTERED

## 2022-09-26 PROCEDURE — 45385 COLONOSCOPY W/LESION REMOVAL: CPT | Mod: 33,,, | Performed by: INTERNAL MEDICINE

## 2022-09-26 PROCEDURE — 37000008 HC ANESTHESIA 1ST 15 MINUTES: Performed by: INTERNAL MEDICINE

## 2022-09-26 PROCEDURE — 25000003 PHARM REV CODE 250: Performed by: INTERNAL MEDICINE

## 2022-09-26 PROCEDURE — 81025 URINE PREGNANCY TEST: CPT | Performed by: INTERNAL MEDICINE

## 2022-09-26 PROCEDURE — 37000009 HC ANESTHESIA EA ADD 15 MINS: Performed by: INTERNAL MEDICINE

## 2022-09-26 PROCEDURE — 27201089 HC SNARE, DISP (ANY): Performed by: INTERNAL MEDICINE

## 2022-09-26 PROCEDURE — 45385 PR COLONOSCOPY,REMV LESN,SNARE: ICD-10-PCS | Mod: 33,,, | Performed by: INTERNAL MEDICINE

## 2022-09-26 PROCEDURE — E9220 PRA ENDO ANESTHESIA: HCPCS | Mod: 33,,, | Performed by: NURSE ANESTHETIST, CERTIFIED REGISTERED

## 2022-09-26 PROCEDURE — 88305 TISSUE EXAM BY PATHOLOGIST: CPT | Performed by: STUDENT IN AN ORGANIZED HEALTH CARE EDUCATION/TRAINING PROGRAM

## 2022-09-26 PROCEDURE — 88305 TISSUE EXAM BY PATHOLOGIST: ICD-10-PCS | Mod: 26,,, | Performed by: STUDENT IN AN ORGANIZED HEALTH CARE EDUCATION/TRAINING PROGRAM

## 2022-09-26 PROCEDURE — 45385 COLONOSCOPY W/LESION REMOVAL: CPT | Mod: PT | Performed by: INTERNAL MEDICINE

## 2022-09-26 PROCEDURE — 88305 TISSUE EXAM BY PATHOLOGIST: CPT | Mod: 26,,, | Performed by: STUDENT IN AN ORGANIZED HEALTH CARE EDUCATION/TRAINING PROGRAM

## 2022-09-26 PROCEDURE — E9220 PRA ENDO ANESTHESIA: ICD-10-PCS | Mod: 33,,, | Performed by: NURSE ANESTHETIST, CERTIFIED REGISTERED

## 2022-09-26 RX ORDER — SODIUM CHLORIDE 9 MG/ML
INJECTION, SOLUTION INTRAVENOUS CONTINUOUS
Status: DISCONTINUED | OUTPATIENT
Start: 2022-09-26 | End: 2022-09-26 | Stop reason: HOSPADM

## 2022-09-26 RX ORDER — PROPOFOL 10 MG/ML
VIAL (ML) INTRAVENOUS CONTINUOUS PRN
Status: DISCONTINUED | OUTPATIENT
Start: 2022-09-26 | End: 2022-09-26

## 2022-09-26 RX ORDER — LIDOCAINE HYDROCHLORIDE 20 MG/ML
INJECTION INTRAVENOUS
Status: DISCONTINUED | OUTPATIENT
Start: 2022-09-26 | End: 2022-09-26

## 2022-09-26 RX ORDER — PROPOFOL 10 MG/ML
VIAL (ML) INTRAVENOUS
Status: DISCONTINUED | OUTPATIENT
Start: 2022-09-26 | End: 2022-09-26

## 2022-09-26 RX ADMIN — SODIUM CHLORIDE: 0.9 INJECTION, SOLUTION INTRAVENOUS at 12:09

## 2022-09-26 RX ADMIN — LIDOCAINE HYDROCHLORIDE 50 MG: 20 INJECTION INTRAVENOUS at 01:09

## 2022-09-26 RX ADMIN — PROPOFOL 70 MG: 10 INJECTION, EMULSION INTRAVENOUS at 01:09

## 2022-09-26 RX ADMIN — SODIUM CHLORIDE: 0.9 INJECTION, SOLUTION INTRAVENOUS at 01:09

## 2022-09-26 RX ADMIN — PROPOFOL 150 MCG/KG/MIN: 10 INJECTION, EMULSION INTRAVENOUS at 01:09

## 2022-09-26 RX ADMIN — PROPOFOL 30 MG: 10 INJECTION, EMULSION INTRAVENOUS at 01:09

## 2022-09-26 RX ADMIN — PROPOFOL 40 MG: 10 INJECTION, EMULSION INTRAVENOUS at 01:09

## 2022-09-26 NOTE — ANESTHESIA POSTPROCEDURE EVALUATION
Anesthesia Post Evaluation    Patient: Thao Ybarra    Procedure(s) Performed: Procedure(s) (LRB):  COLONOSCOPY (N/A)    Final Anesthesia Type: general      Patient location during evaluation: PACU  Patient participation: Yes- Able to Participate  Level of consciousness: awake  Post-procedure vital signs: reviewed and stable  Pain management: adequate  Airway patency: patent    PONV status at discharge: No PONV  Anesthetic complications: no      Cardiovascular status: blood pressure returned to baseline  Respiratory status: unassisted  Hydration status: euvolemic  Follow-up not needed.          Vitals Value Taken Time   /69 09/26/22 1401   Temp 36.4 °C (97.5 °F) 09/26/22 1339   Pulse 57 09/26/22 1401   Resp 16 09/26/22 1401   SpO2 96 % 09/26/22 1401         No case tracking events are documented in the log.      Pain/Ashley Score: Ashley Score: 10 (9/26/2022  2:01 PM)

## 2022-09-26 NOTE — H&P
Short Stay Endoscopy History and Physical    PCP - Danny Harrington MD    Procedure - Colonoscopy  Sedation: GA  ASA - per anesthesia  Mallampati - per anesthesia  History of Anesthesia problems - no  Family history Anesthesia problems -  no     HPI:  This is a 42 y.o. female here for evaluation of : Screening for CRC (Father with colon cancer)    Reflux - no  Dysphagia - no  Abdominal pain - no  Diarrhea - no    ROS:  Constitutional: No fevers, chills, No weight loss  ENT: No allergies  CV: No chest pain  Pulm: No cough, No shortness of breath  Ophtho: No vision changes  GI: see HPI  Medical History:  has a past medical history of Diabetes mellitus type I and Hypothyroidism.    Surgical History:  has a past surgical history that includes  section.    Family History: family history includes Cancer (age of onset: 79) in her father; No Known Problems in her brother, daughter, mother, and son.. Otherwise no colon cancer, inflammatory bowel disease, or GI malignancies.    Social History:  reports that she has never smoked. She has never used smokeless tobacco. She reports that she does not drink alcohol and does not use drugs.    Review of patient's allergies indicates:  No Known Allergies    Medications:   Medications Prior to Admission   Medication Sig Dispense Refill Last Dose    blood sugar diagnostic Strp To check BG 4 times daily, to use with insurance preferred meter. 200 strip 3 2022    blood-glucose meter (CONTOUR LINK MISC) by Misc.(Non-Drug; Combo Route) route.   2022    blood-glucose sensor (DEXCOM G6 SENSOR) Crystal 1 Device by Misc.(Non-Drug; Combo Route) route every 10 days. 9 each 3 2022    blood-glucose transmitter (DEXCOM G6 TRANSMITTER) Crystal 1 Device by Misc.(Non-Drug; Combo Route) route every 3 (three) months. 1 each 3 2022    ergocalciferol (ERGOCALCIFEROL) 50,000 unit Cap Take 1 capsule (50,000 Units total) by mouth every 7 days. For 8 weeks only then start over-the-counter  vitamin-D 5000 international units p.o. daily . 8 capsule 0 Past Week    insulin aspart U-100 (NOVOLOG U-100 INSULIN ASPART) 100 unit/mL injection For use in insulin pump, max  units per day 10 each 3 9/25/2022    levothyroxine (SYNTHROID) 137 MCG Tab tablet TAKE 1 TABLET BY MOUTH BEFORE BREAKFAST. 90 tablet 2 9/25/2022    rosuvastatin (CRESTOR) 5 MG tablet Take 1 tablet (5 mg total) by mouth once daily. 90 tablet 3 9/25/2022    sertraline (ZOLOFT) 50 MG tablet Take 1 tablet (50 mg total) by mouth once daily. 90 tablet 3 9/25/2022    glucagon, human recombinant, (GLUCAGON EMERGENCY KIT, HUMAN,) 1 mg SolR Inject 1 mg into the muscle as needed. 1 each 1     insulin (LANTUS SOLOSTAR U-100 INSULIN) glargine 100 units/mL (3mL) SubQ pen Inject 32 Units into the skin once daily. In case insulin pump fails 3 mL 1        Objective Findings:    Vital Signs: Per nursing notes.    Physical Exam:  General Appearance: Well appearing in no acute distress  Head:   Normocephalic, without obvious abnormality  Eyes:    No scleral icterus  Airway: Open  Neck: No restriction in mobility  Lungs: CTA bilaterally in anterior and posterior fields, no wheezes, no crackles.  Heart:  Regular rate and rhythm, S1, S2 normal, no murmurs heard  Abdomen: Soft, non tender, non distended      Labs:  Lab Results   Component Value Date    WBC 4.48 05/23/2022    HGB 10.3 (L) 05/23/2022    HCT 37.8 05/23/2022     05/23/2022    CHOL 135 02/22/2022    TRIG 50 02/22/2022    HDL 67 02/22/2022    ALT 32 06/14/2022    AST 23 06/14/2022     06/14/2022    K 3.9 06/14/2022     06/14/2022    CREATININE 0.7 06/14/2022    BUN 10 06/14/2022    CO2 26 06/14/2022    TSH 2.391 02/22/2022    HGBA1C 5.9 (H) 05/23/2022         I have explained the risks and benefits of endoscopy procedures to the patient including but not limited to bleeding, perforation, infection, and death.    Thank you so much for allowing me to participate in the care of  Thao García MD

## 2022-09-26 NOTE — ANESTHESIA PREPROCEDURE EVALUATION
2022  Thao Ybarra is a 42 y.o., female.    Active Problem List with Overview Notes    Diagnosis Date Noted    Vitamin D deficiency 2022    BMI 33.0-33.9,adult 2022    Insulin pump status 2021    Controlled diabetes mellitus type 1 without complications 2019    Hypothyroidism (acquired) 2019     Past Surgical History:   Procedure Laterality Date     SECTION      x2       Pre-op Assessment    I have reviewed the Patient Summary Reports.    I have reviewed the NPO Status.   I have reviewed the Medications.     Review of Systems  Anesthesia Hx:  No problems with previous Anesthesia    Social:  Non-Smoker, Social Alcohol Use    Hematology/Oncology:  Hematology Normal   Oncology Normal     EENT/Dental:EENT/Dental Normal   Cardiovascular:  Cardiovascular Normal   Denies Angina.  Denies WHEAT.    Pulmonary:  Pulmonary Normal    Renal/:  Renal/ Normal     Hepatic/GI:  Hepatic/GI Normal    Musculoskeletal:  Musculoskeletal Normal    Neurological:  Neurology Normal    Endocrine:   Diabetes, well controlled, type 1 Hypothyroidism    Dermatological:  Skin Normal    Psych:  Psychiatric Normal           Physical Exam  General: Well nourished, Cooperative, Alert and Oriented    Airway:  Mallampati: II   Mouth Opening: Normal  TM Distance: Normal  Tongue: Normal  Neck ROM: Normal ROM    Dental:  Intact    Chest/Lungs:  Clear to auscultation, Normal Respiratory Rate    Heart:  Rate: Normal  Rhythm: Regular Rhythm        Anesthesia Plan  Type of Anesthesia, risks & benefits discussed:    Anesthesia Type: Gen Natural Airway  Intra-op Monitoring Plan: Standard ASA Monitors  Post Op Pain Control Plan: multimodal analgesia  Induction:  IV  Informed Consent: Informed consent signed with the Patient and all parties understand the risks and agree with anesthesia plan.  All questions  answered.   ASA Score: 2  Day of Surgery Review of History & Physical: H&P Update referred to the surgeon/provider.    Ready For Surgery From Anesthesia Perspective.     .

## 2022-09-26 NOTE — PROVATION PATIENT INSTRUCTIONS
Discharge Summary/Instructions after an Endoscopic Procedure  Patient Name: Thao Ybarra  Patient MRN: [id number]  Patient YOB: 1980 Monday, September 26, 2022  Conner García MD  Dear patient,  As a result of recent federal legislation (The Federal Cures Act), you may   receive lab or pathology results from your procedure in your [facility]   account before your physician is able to contact you. Your physician or   their representative will relay the results to you with their   recommendations at their soonest availability.  Thank you,  RESTRICTIONS:  During your procedure today, you received medications for sedation.  These   medications may affect your judgment, balance and coordination.  Therefore,   for 24 hours, you have the following restrictions:   - DO NOT drive a car, operate machinery, make legal/financial decisions,   sign important papers or drink alcohol.    ACTIVITY:  Today: no heavy lifting, straining or running due to procedural   sedation/anesthesia.  The following day: return to full activity including work.  DIET:  Eat and drink normally unless instructed otherwise.     TREATMENT FOR COMMON SIDE EFFECTS:  - Mild abdominal pain, nausea, belching, bloating or excessive gas:  rest,   eat lightly and use a heating pad.  - Sore Throat: treat with throat lozenges and/or gargle with warm salt   water.  - Because air was used during the procedure, expelling large amounts of air   from your rectum or belching is normal.  - If a bowel prep was taken, you may not have a bowel movement for 1-3 days.    This is normal.  SYMPTOMS TO WATCH FOR AND REPORT TO YOUR PHYSICIAN:  1. Abdominal pain or bloating, other than gas cramps.  2. Chest pain.  3. Back pain.  4. Signs of infection such as: chills or fever occurring within 24 hours   after the procedure.  5. Rectal bleeding, which would show as bright red, maroon, or black stools.   (A tablespoon of blood from the rectum is not serious, especially  if   hemorrhoids are present.)  6. Vomiting.  7. Weakness or dizziness.  GO DIRECTLY TO THE NEAREST EMERGENCY ROOM IF YOU HAVE ANY OF THE FOLLOWING:      Difficulty breathing              Chills and/or fever over 101 F   Persistent vomiting and/or vomiting blood   Severe abdominal pain   Severe chest pain   Black, tarry stools   Bleeding- more than one tablespoon   Any other symptom or condition that you feel may need urgent attention  Your doctor recommends these additional instructions:  If any biopsies were taken, your doctors clinic will contact you in 1 to 2   weeks with any results.  - Patient has a contact number available for emergencies.  The signs and   symptoms of potential delayed complications were discussed with the   patient.  Return to normal activities tomorrow.  Written discharge   instructions were provided to the patient.   - Discharge patient to home.   - Resume previous diet.   - Continue present medications.   - Await pathology results.   - Repeat colonoscopy in 5 years for surveillance.   For questions, problems or results please call your physician - Conner García MD at Work:  [phone].  [facility], EMERGENCY ROOM PHONE NUMBER: [phone]  IF A COMPLICATION OR EMERGENCY SITUATION ARISES AND YOU ARE UNABLE TO REACH   YOUR PHYSICIAN - GO DIRECTLY TO THE EMERGENCY ROOM.  Conner García MD  9/26/2022 1:36:00 PM  This report has been verified and signed electronically.  Dear patient,  As a result of recent federal legislation (The Federal Cures Act), you may   receive lab or pathology results from your procedure in your [facility]   account before your physician is able to contact you. Your physician or   their representative will relay the results to you with their   recommendations at their soonest availability.  Thank you,  PROVATION

## 2022-09-28 ENCOUNTER — OFFICE VISIT (OUTPATIENT)
Dept: ENDOCRINOLOGY | Facility: CLINIC | Age: 42
End: 2022-09-28
Payer: COMMERCIAL

## 2022-09-28 DIAGNOSIS — Z96.41 INSULIN PUMP STATUS: ICD-10-CM

## 2022-09-28 DIAGNOSIS — E10.9 CONTROLLED DIABETES MELLITUS TYPE 1 WITHOUT COMPLICATIONS: Primary | ICD-10-CM

## 2022-09-28 DIAGNOSIS — E03.9 HYPOTHYROIDISM (ACQUIRED): ICD-10-CM

## 2022-09-28 DIAGNOSIS — E78.5 HYPERLIPIDEMIA, UNSPECIFIED HYPERLIPIDEMIA TYPE: ICD-10-CM

## 2022-09-28 LAB
FINAL PATHOLOGIC DIAGNOSIS: NORMAL
GROSS: NORMAL
Lab: NORMAL

## 2022-09-28 PROCEDURE — 99214 OFFICE O/P EST MOD 30 MIN: CPT | Mod: 95,,, | Performed by: INTERNAL MEDICINE

## 2022-09-28 PROCEDURE — 3072F LOW RISK FOR RETINOPATHY: CPT | Mod: CPTII,95,, | Performed by: INTERNAL MEDICINE

## 2022-09-28 PROCEDURE — 1159F MED LIST DOCD IN RCRD: CPT | Mod: CPTII,95,, | Performed by: INTERNAL MEDICINE

## 2022-09-28 PROCEDURE — 1159F PR MEDICATION LIST DOCUMENTED IN MEDICAL RECORD: ICD-10-PCS | Mod: CPTII,95,, | Performed by: INTERNAL MEDICINE

## 2022-09-28 PROCEDURE — 3044F PR MOST RECENT HEMOGLOBIN A1C LEVEL <7.0%: ICD-10-PCS | Mod: CPTII,95,, | Performed by: INTERNAL MEDICINE

## 2022-09-28 PROCEDURE — 3044F HG A1C LEVEL LT 7.0%: CPT | Mod: CPTII,95,, | Performed by: INTERNAL MEDICINE

## 2022-09-28 PROCEDURE — 3072F PR LOW RISK FOR RETINOPATHY: ICD-10-PCS | Mod: CPTII,95,, | Performed by: INTERNAL MEDICINE

## 2022-09-28 PROCEDURE — 1160F RVW MEDS BY RX/DR IN RCRD: CPT | Mod: CPTII,95,, | Performed by: INTERNAL MEDICINE

## 2022-09-28 PROCEDURE — 99214 PR OFFICE/OUTPT VISIT, EST, LEVL IV, 30-39 MIN: ICD-10-PCS | Mod: 95,,, | Performed by: INTERNAL MEDICINE

## 2022-09-28 PROCEDURE — 1160F PR REVIEW ALL MEDS BY PRESCRIBER/CLIN PHARMACIST DOCUMENTED: ICD-10-PCS | Mod: CPTII,95,, | Performed by: INTERNAL MEDICINE

## 2022-09-28 NOTE — PROGRESS NOTES
Pump Clinic Return Visit    Chief Complaint   Patient presents with    Diabetes         The patient location is: [place]  The chief complaint leading to consultation is: T1DM    Visit type: audiovisual    Face to Face time with patient: 30  40 minutes of total time spent on the encounter, which includes face to face time and non-face to face time preparing to see the patient (eg, review of tests), Obtaining and/or reviewing separately obtained history, Documenting clinical information in the electronic or other health record, Independently interpreting results (not separately reported) and communicating results to the patient/family/caregiver, or Care coordination (not separately reported).     Each patient to whom he or she provides medical services by telemedicine is:  (1) informed of the relationship between the physician and patient and the respective role of any other health care provider with respect to management of the patient; and (2) notified that he or she may decline to receive medical services by telemedicine and may withdraw from such care at any time.       HPI:Thao Ybarra is a 42 y.o. female with a past medical history of type 1 diabetes mellitus on an insulin pump.       Since last visit has changed to Tslim with Control IQ. Has been a little bit of rough transition getting used to new system. Having to learn to trust pump, do less correcting of lows but think she is getting hang of it    Not using sleep mode    Last data from June but opened during visit so can get newer data    Not using bolus wizard much as found does not give enough insulin    Current diabetes regimen:  Pump: Tslim Control IQ    Pump Settings  Midnight 0.800 u/hr 1u:40 mg/dL 1u:8.0 g 110 mg/dL  7:00 AM 1.200 u/hr 1u:40 mg/dL 1u:8.0 g 110 mg/dL  9:30 AM 1.150 u/hr 1u:40 mg/dL 1u:8.0 g 110 mg/dL  8:00 PM 1.100 u/hr 1u:40 mg/dL 1u:8.0 g 110 mg/dL  Calculated Total Daily Basal 25.1 units  Duration of Insulin: 5:00 hours  Carbohydrates:  On    Using manual boluses usually 1:10  Correction 1:40-50      Lab Results   Component Value Date    HGBA1C 5.9 (H) 05/23/2022       Glucose Monitoring:  Meter/CGM: Dexcom G6        Hypoglycemic Episodes:  No severe episodes    Since last visit no severe hypoglycemic episodes requiring observer intervention    DKA: No    Screening / DM Complications:    Nephropathy:  ACEi/ARB: not taking  Lab Results   Component Value Date    MICALBCREAT 4.0 01/17/2019       Last Lipid Panel:  Statin: Taking  Lab Results   Component Value Date    LDLCALC 58.0 (L) 02/22/2022       Last foot exam Most Recent Foot Exam Date: Not Found  Last eye exam : 12/17/2021;  no laser surgery or DR    Hypothyroidism  Taking LT4 137 mcg daily. Stable dose for many years  Lab Results   Component Value Date    TSH 2.391 02/22/2022       No results found for: TTGIGA    Aspirin: no        Current Outpatient Medications:     blood sugar diagnostic Strp, To check BG 4 times daily, to use with insurance preferred meter., Disp: 200 strip, Rfl: 3    blood-glucose meter (CONTOUR LINK MISC), by Misc.(Non-Drug; Combo Route) route., Disp: , Rfl:     blood-glucose sensor (DEXCOM G6 SENSOR) Crystal, 1 Device by Misc.(Non-Drug; Combo Route) route every 10 days., Disp: 9 each, Rfl: 3    blood-glucose transmitter (DEXCOM G6 TRANSMITTER) Crystal, 1 Device by Misc.(Non-Drug; Combo Route) route every 3 (three) months., Disp: 1 each, Rfl: 3    ergocalciferol (ERGOCALCIFEROL) 50,000 unit Cap, Take 1 capsule (50,000 Units total) by mouth every 7 days. For 8 weeks only then start over-the-counter vitamin-D 5000 international units p.o. daily ., Disp: 8 capsule, Rfl: 0    glucagon, human recombinant, (GLUCAGON EMERGENCY KIT, HUMAN,) 1 mg SolR, Inject 1 mg into the muscle as needed., Disp: 1 each, Rfl: 1    insulin (LANTUS SOLOSTAR U-100 INSULIN) glargine 100 units/mL (3mL) SubQ pen, Inject 32 Units into the skin once daily. In case insulin pump fails, Disp: 3 mL, Rfl: 1     insulin aspart U-100 (NOVOLOG U-100 INSULIN ASPART) 100 unit/mL injection, For use in insulin pump, max  units per day, Disp: 10 each, Rfl: 3    levothyroxine (SYNTHROID) 137 MCG Tab tablet, TAKE 1 TABLET BY MOUTH BEFORE BREAKFAST., Disp: 90 tablet, Rfl: 2    rosuvastatin (CRESTOR) 5 MG tablet, Take 1 tablet (5 mg total) by mouth once daily., Disp: 90 tablet, Rfl: 3    sertraline (ZOLOFT) 50 MG tablet, Take 1 tablet (50 mg total) by mouth once daily., Disp: 90 tablet, Rfl: 3        ROS as above      Chemistry        Component Value Date/Time     06/14/2022 0846    K 3.9 06/14/2022 0846     06/14/2022 0846    CO2 26 06/14/2022 0846    BUN 10 06/14/2022 0846    CREATININE 0.7 06/14/2022 0846     (H) 06/14/2022 0846        Component Value Date/Time    CALCIUM 9.2 06/14/2022 0846    ALKPHOS 74 06/14/2022 0846    AST 23 06/14/2022 0846    ALT 32 06/14/2022 0846    BILITOT 0.8 06/14/2022 0846    ESTGFRAFRICA >60.0 06/14/2022 0846    EGFRNONAA >60.0 06/14/2022 0846           Lab Results   Component Value Date    CHOL 135 02/22/2022    CHOL 167 11/25/2019    CHOL 178 01/09/2019     Lab Results   Component Value Date    HDL 67 02/22/2022    HDL 67 11/25/2019    HDL 75 01/09/2019     Lab Results   Component Value Date    LDLCALC 58.0 (L) 02/22/2022    LDLCALC 92.8 11/25/2019    LDLCALC 94.2 01/09/2019     Lab Results   Component Value Date    TRIG 50 02/22/2022    TRIG 36 11/25/2019    TRIG 44 01/09/2019     Lab Results   Component Value Date    CHOLHDL 49.6 02/22/2022    CHOLHDL 40.1 11/25/2019    CHOLHDL 42.1 01/09/2019           Assessment/Plan  Controlled diabetes mellitus type 1 without complications  Limited data to review today but will look for pump data when uploads  Reviewed Dexcom and with variability in pattern which suggests excess basal leading to lows, late prandial dose with high and then subsequent low  Once pump download available will review and suggest changes  Will set sleep  schedule and try to let pump correct overnight. Reviewed use of exercise mode as well  May need stronger CR as well with closed loop system  Update A1c    Hypothyroidism (acquired)  Clinically and biochemically euthyroid.  Cont LT4 137 mcg daily with titration pending labs    Insulin pump status  See above    HLD (hyperlipidemia)  On statin per ADA guidelines  LDL at goal    Glucagon kit:  Yes    FOLLOW UP  No follow-ups on file.       Pump backup plan    If the insulin pump is non functional and discontinued for anticipated more than 20 hours, please give daily injections of:  Long acting insulin: lantus 32 units daily  Short acting insulin:  novolog with carb ratio of 1 unit per 8 grams and correction 1 unit per 40 above 120    When the insulin pump is restarted, do not restart basal rates until at least 22 hours after the last long acting insulin injection. You can set a 0% temporary basal setting that will last until this time and use your pump to bolus for meals and correction.    For any technical insulin pump issues, please contact the insulin pump company; the toll free number is printed on the label on the back of the insulin pump.

## 2022-09-28 NOTE — ASSESSMENT & PLAN NOTE
Limited data to review today but will look for pump data when uploads  Reviewed Dexcom and with variability in pattern which suggests excess basal leading to lows, late prandial dose with high and then subsequent low  Once pump download available will review and suggest changes  Will set sleep schedule and try to let pump correct overnight. Reviewed use of exercise mode as well  May need stronger CR as well with closed loop system  Update A1c

## 2022-09-29 ENCOUNTER — PATIENT MESSAGE (OUTPATIENT)
Dept: ENDOCRINOLOGY | Facility: CLINIC | Age: 42
End: 2022-09-29
Payer: COMMERCIAL

## 2022-09-30 ENCOUNTER — TELEPHONE (OUTPATIENT)
Dept: GASTROENTEROLOGY | Facility: CLINIC | Age: 42
End: 2022-09-30
Payer: COMMERCIAL

## 2022-10-04 ENCOUNTER — IMMUNIZATION (OUTPATIENT)
Dept: PRIMARY CARE CLINIC | Facility: CLINIC | Age: 42
End: 2022-10-04
Payer: COMMERCIAL

## 2022-10-04 DIAGNOSIS — Z23 NEED FOR VACCINATION: Primary | ICD-10-CM

## 2022-10-04 PROCEDURE — 91312 COVID-19, MRNA, LNP-S, BIVALENT BOOSTER, PF, 30 MCG/0.3 ML DOSE: CPT | Mod: PBBFAC | Performed by: INTERNAL MEDICINE

## 2022-10-04 PROCEDURE — 0124A COVID-19, MRNA, LNP-S, BIVALENT BOOSTER, PF, 30 MCG/0.3 ML DOSE: CPT | Mod: CV19,PBBFAC | Performed by: INTERNAL MEDICINE

## 2022-10-10 ENCOUNTER — PATIENT MESSAGE (OUTPATIENT)
Dept: ENDOCRINOLOGY | Facility: CLINIC | Age: 42
End: 2022-10-10
Payer: COMMERCIAL

## 2023-01-30 ENCOUNTER — PATIENT MESSAGE (OUTPATIENT)
Dept: ENDOCRINOLOGY | Facility: CLINIC | Age: 43
End: 2023-01-30
Payer: COMMERCIAL

## 2023-01-30 DIAGNOSIS — E10.9 CONTROLLED DIABETES MELLITUS TYPE 1 WITHOUT COMPLICATIONS: Primary | ICD-10-CM

## 2023-01-30 RX ORDER — BLOOD-GLUCOSE SENSOR
1 EACH MISCELLANEOUS
Qty: 9 EACH | Refills: 3 | Status: SHIPPED | OUTPATIENT
Start: 2023-01-30 | End: 2023-10-16 | Stop reason: SDUPTHER

## 2023-01-30 RX ORDER — BLOOD-GLUCOSE TRANSMITTER
1 EACH MISCELLANEOUS
Qty: 1 EACH | Refills: 3 | Status: SHIPPED | OUTPATIENT
Start: 2023-01-30 | End: 2023-10-16 | Stop reason: SDUPTHER

## 2023-02-02 ENCOUNTER — TELEPHONE (OUTPATIENT)
Dept: PHARMACY | Facility: CLINIC | Age: 43
End: 2023-02-02
Payer: COMMERCIAL

## 2023-02-21 ENCOUNTER — PATIENT MESSAGE (OUTPATIENT)
Dept: ADMINISTRATIVE | Facility: HOSPITAL | Age: 43
End: 2023-02-21
Payer: COMMERCIAL

## 2023-04-03 ENCOUNTER — PATIENT MESSAGE (OUTPATIENT)
Dept: ADMINISTRATIVE | Facility: HOSPITAL | Age: 43
End: 2023-04-03
Payer: COMMERCIAL

## 2023-05-21 NOTE — TELEPHONE ENCOUNTER
Refill Routing Note   Medication(s) are not appropriate for processing by [facility] for the following reason(s):      Required labs outdated    [facility] action(s):  Defer None identified            Appointments  past 12m or future 3m with PCP    Date Provider   Last Visit   5/23/2022 Danny Harrington MD   Next Visit   Visit date not found Danny Harrington MD   ED visits in past 90 days: 0        Note composed:1:21 PM 05/21/2023

## 2023-05-21 NOTE — TELEPHONE ENCOUNTER
No care due was identified.  Health [facility] Embedded Care Due Messages. Reference number: [id number].   5/21/2023 7:10:27 AM CDT

## 2023-05-22 RX ORDER — ROSUVASTATIN CALCIUM 5 MG/1
TABLET, COATED ORAL
Qty: 90 TABLET | Refills: 0 | Status: SHIPPED | OUTPATIENT
Start: 2023-05-22 | End: 2023-07-06 | Stop reason: SDUPTHER

## 2023-05-23 ENCOUNTER — PATIENT MESSAGE (OUTPATIENT)
Dept: ADMINISTRATIVE | Facility: HOSPITAL | Age: 43
End: 2023-05-23
Payer: COMMERCIAL

## 2023-06-22 ENCOUNTER — PATIENT OUTREACH (OUTPATIENT)
Dept: ADMINISTRATIVE | Facility: HOSPITAL | Age: 43
End: 2023-06-22
Payer: COMMERCIAL

## 2023-06-22 DIAGNOSIS — Z12.31 OTHER SCREENING MAMMOGRAM: ICD-10-CM

## 2023-06-22 NOTE — PROGRESS NOTES
Health Maintenance Due   Topic Date Due    HIV Screening  Never done    Diabetes Urine Screening  01/17/2020    Foot Exam  09/18/2020    TETANUS VACCINE  05/10/2021    Pneumococcal Vaccines (Age 0-64) (2 - PCV) 03/19/2022    Hemoglobin A1c  11/23/2022    Eye Exam  12/17/2022    Lipid Panel  02/22/2023    Mammogram  06/06/2023        Chart review done.   HM updated.   Immunizations reviewed & updated.   Care Everywhere updated.

## 2023-06-27 ENCOUNTER — PATIENT MESSAGE (OUTPATIENT)
Dept: ADMINISTRATIVE | Facility: HOSPITAL | Age: 43
End: 2023-06-27
Payer: COMMERCIAL

## 2023-07-06 ENCOUNTER — OFFICE VISIT (OUTPATIENT)
Dept: PRIMARY CARE CLINIC | Facility: CLINIC | Age: 43
End: 2023-07-06
Payer: COMMERCIAL

## 2023-07-06 ENCOUNTER — LAB VISIT (OUTPATIENT)
Dept: LAB | Facility: HOSPITAL | Age: 43
End: 2023-07-06
Attending: FAMILY MEDICINE
Payer: COMMERCIAL

## 2023-07-06 VITALS
OXYGEN SATURATION: 98 % | HEIGHT: 72 IN | HEART RATE: 67 BPM | WEIGHT: 211 LBS | RESPIRATION RATE: 20 BRPM | SYSTOLIC BLOOD PRESSURE: 114 MMHG | DIASTOLIC BLOOD PRESSURE: 70 MMHG | BODY MASS INDEX: 28.58 KG/M2 | TEMPERATURE: 98 F

## 2023-07-06 DIAGNOSIS — Z00.00 ROUTINE MEDICAL EXAM: ICD-10-CM

## 2023-07-06 DIAGNOSIS — E55.9 VITAMIN D DEFICIENCY: ICD-10-CM

## 2023-07-06 DIAGNOSIS — D64.9 ANEMIA, UNSPECIFIED TYPE: ICD-10-CM

## 2023-07-06 DIAGNOSIS — E03.9 HYPOTHYROIDISM (ACQUIRED): ICD-10-CM

## 2023-07-06 DIAGNOSIS — Z12.83 SKIN CANCER SCREENING: ICD-10-CM

## 2023-07-06 DIAGNOSIS — E78.2 MIXED HYPERLIPIDEMIA: ICD-10-CM

## 2023-07-06 DIAGNOSIS — E10.9 CONTROLLED DIABETES MELLITUS TYPE 1 WITHOUT COMPLICATIONS: ICD-10-CM

## 2023-07-06 DIAGNOSIS — Z00.00 ROUTINE MEDICAL EXAM: Primary | ICD-10-CM

## 2023-07-06 LAB
25(OH)D3+25(OH)D2 SERPL-MCNC: 20 NG/ML (ref 30–96)
ALBUMIN SERPL BCP-MCNC: 3.7 G/DL (ref 3.5–5.2)
ALP SERPL-CCNC: 72 U/L (ref 55–135)
ALT SERPL W/O P-5'-P-CCNC: 19 U/L (ref 10–44)
ANION GAP SERPL CALC-SCNC: 9 MMOL/L (ref 8–16)
AST SERPL-CCNC: 24 U/L (ref 10–40)
BILIRUB SERPL-MCNC: 0.4 MG/DL (ref 0.1–1)
BUN SERPL-MCNC: 5 MG/DL (ref 6–20)
CALCIUM SERPL-MCNC: 9 MG/DL (ref 8.7–10.5)
CHLORIDE SERPL-SCNC: 110 MMOL/L (ref 95–110)
CHOLEST SERPL-MCNC: 131 MG/DL (ref 120–199)
CHOLEST/HDLC SERPL: 2.2 {RATIO} (ref 2–5)
CO2 SERPL-SCNC: 24 MMOL/L (ref 23–29)
CREAT SERPL-MCNC: 0.8 MG/DL (ref 0.5–1.4)
ERYTHROCYTE [DISTWIDTH] IN BLOOD BY AUTOMATED COUNT: 15.9 % (ref 11.5–14.5)
EST. GFR  (NO RACE VARIABLE): >60 ML/MIN/1.73 M^2
ESTIMATED AVG GLUCOSE: 120 MG/DL (ref 68–131)
FERRITIN SERPL-MCNC: 9 NG/ML (ref 20–300)
FOLATE SERPL-MCNC: 3.8 NG/ML (ref 4–24)
GLUCOSE SERPL-MCNC: 123 MG/DL (ref 70–110)
HBA1C MFR BLD: 5.8 % (ref 4–5.6)
HCT VFR BLD AUTO: 35.2 % (ref 37–48.5)
HDLC SERPL-MCNC: 60 MG/DL (ref 40–75)
HDLC SERPL: 45.8 % (ref 20–50)
HGB BLD-MCNC: 10.3 G/DL (ref 12–16)
HIV 1+2 AB+HIV1 P24 AG SERPL QL IA: NORMAL
IRON SERPL-MCNC: 31 UG/DL (ref 30–160)
LDLC SERPL CALC-MCNC: 64.2 MG/DL (ref 63–159)
MCH RBC QN AUTO: 24.3 PG (ref 27–31)
MCHC RBC AUTO-ENTMCNC: 29.3 G/DL (ref 32–36)
MCV RBC AUTO: 83 FL (ref 82–98)
NONHDLC SERPL-MCNC: 71 MG/DL
PLATELET # BLD AUTO: 175 K/UL (ref 150–450)
PMV BLD AUTO: ABNORMAL FL (ref 9.2–12.9)
POTASSIUM SERPL-SCNC: 4 MMOL/L (ref 3.5–5.1)
PROT SERPL-MCNC: 6.5 G/DL (ref 6–8.4)
RBC # BLD AUTO: 4.23 M/UL (ref 4–5.4)
SATURATED IRON: 9 % (ref 20–50)
SODIUM SERPL-SCNC: 143 MMOL/L (ref 136–145)
T4 FREE SERPL-MCNC: 1.13 NG/DL (ref 0.71–1.51)
TOTAL IRON BINDING CAPACITY: 351 UG/DL (ref 250–450)
TRANSFERRIN SERPL-MCNC: 237 MG/DL (ref 200–375)
TRIGL SERPL-MCNC: 34 MG/DL (ref 30–150)
TSH SERPL DL<=0.005 MIU/L-ACNC: 0.11 UIU/ML (ref 0.4–4)
WBC # BLD AUTO: 3.47 K/UL (ref 3.9–12.7)

## 2023-07-06 PROCEDURE — 3008F PR BODY MASS INDEX (BMI) DOCUMENTED: ICD-10-PCS | Mod: CPTII,S$GLB,, | Performed by: FAMILY MEDICINE

## 2023-07-06 PROCEDURE — 80053 COMPREHEN METABOLIC PANEL: CPT | Performed by: FAMILY MEDICINE

## 2023-07-06 PROCEDURE — 3078F DIAST BP <80 MM HG: CPT | Mod: CPTII,S$GLB,, | Performed by: FAMILY MEDICINE

## 2023-07-06 PROCEDURE — 1160F PR REVIEW ALL MEDS BY PRESCRIBER/CLIN PHARMACIST DOCUMENTED: ICD-10-PCS | Mod: CPTII,S$GLB,, | Performed by: FAMILY MEDICINE

## 2023-07-06 PROCEDURE — 3061F NEG MICROALBUMINURIA REV: CPT | Mod: CPTII,S$GLB,, | Performed by: FAMILY MEDICINE

## 2023-07-06 PROCEDURE — 84439 ASSAY OF FREE THYROXINE: CPT | Performed by: FAMILY MEDICINE

## 2023-07-06 PROCEDURE — 99396 PREV VISIT EST AGE 40-64: CPT | Mod: S$GLB,,, | Performed by: FAMILY MEDICINE

## 2023-07-06 PROCEDURE — 3074F SYST BP LT 130 MM HG: CPT | Mod: CPTII,S$GLB,, | Performed by: FAMILY MEDICINE

## 2023-07-06 PROCEDURE — 3074F PR MOST RECENT SYSTOLIC BLOOD PRESSURE < 130 MM HG: ICD-10-PCS | Mod: CPTII,S$GLB,, | Performed by: FAMILY MEDICINE

## 2023-07-06 PROCEDURE — 1159F PR MEDICATION LIST DOCUMENTED IN MEDICAL RECORD: ICD-10-PCS | Mod: CPTII,S$GLB,, | Performed by: FAMILY MEDICINE

## 2023-07-06 PROCEDURE — 3044F HG A1C LEVEL LT 7.0%: CPT | Mod: CPTII,S$GLB,, | Performed by: FAMILY MEDICINE

## 2023-07-06 PROCEDURE — 3078F PR MOST RECENT DIASTOLIC BLOOD PRESSURE < 80 MM HG: ICD-10-PCS | Mod: CPTII,S$GLB,, | Performed by: FAMILY MEDICINE

## 2023-07-06 PROCEDURE — 36415 COLL VENOUS BLD VENIPUNCTURE: CPT | Mod: PN | Performed by: FAMILY MEDICINE

## 2023-07-06 PROCEDURE — 80061 LIPID PANEL: CPT | Performed by: FAMILY MEDICINE

## 2023-07-06 PROCEDURE — 83036 HEMOGLOBIN GLYCOSYLATED A1C: CPT | Performed by: FAMILY MEDICINE

## 2023-07-06 PROCEDURE — 82728 ASSAY OF FERRITIN: CPT | Performed by: FAMILY MEDICINE

## 2023-07-06 PROCEDURE — 3044F PR MOST RECENT HEMOGLOBIN A1C LEVEL <7.0%: ICD-10-PCS | Mod: CPTII,S$GLB,, | Performed by: FAMILY MEDICINE

## 2023-07-06 PROCEDURE — 3066F PR DOCUMENTATION OF TREATMENT FOR NEPHROPATHY: ICD-10-PCS | Mod: CPTII,S$GLB,, | Performed by: FAMILY MEDICINE

## 2023-07-06 PROCEDURE — 84466 ASSAY OF TRANSFERRIN: CPT | Performed by: FAMILY MEDICINE

## 2023-07-06 PROCEDURE — 87389 HIV-1 AG W/HIV-1&-2 AB AG IA: CPT | Performed by: FAMILY MEDICINE

## 2023-07-06 PROCEDURE — 99999 PR PBB SHADOW E&M-EST. PATIENT-LVL IV: CPT | Mod: PBBFAC,,, | Performed by: FAMILY MEDICINE

## 2023-07-06 PROCEDURE — 1159F MED LIST DOCD IN RCRD: CPT | Mod: CPTII,S$GLB,, | Performed by: FAMILY MEDICINE

## 2023-07-06 PROCEDURE — 99396 PR PREVENTIVE VISIT,EST,40-64: ICD-10-PCS | Mod: S$GLB,,, | Performed by: FAMILY MEDICINE

## 2023-07-06 PROCEDURE — 82306 VITAMIN D 25 HYDROXY: CPT | Performed by: FAMILY MEDICINE

## 2023-07-06 PROCEDURE — 84443 ASSAY THYROID STIM HORMONE: CPT | Performed by: FAMILY MEDICINE

## 2023-07-06 PROCEDURE — 99999 PR PBB SHADOW E&M-EST. PATIENT-LVL IV: ICD-10-PCS | Mod: PBBFAC,,, | Performed by: FAMILY MEDICINE

## 2023-07-06 PROCEDURE — 1160F RVW MEDS BY RX/DR IN RCRD: CPT | Mod: CPTII,S$GLB,, | Performed by: FAMILY MEDICINE

## 2023-07-06 PROCEDURE — 82746 ASSAY OF FOLIC ACID SERUM: CPT | Performed by: FAMILY MEDICINE

## 2023-07-06 PROCEDURE — 3066F NEPHROPATHY DOC TX: CPT | Mod: CPTII,S$GLB,, | Performed by: FAMILY MEDICINE

## 2023-07-06 PROCEDURE — 3061F PR NEG MICROALBUMINURIA RESULT DOCUMENTED/REVIEW: ICD-10-PCS | Mod: CPTII,S$GLB,, | Performed by: FAMILY MEDICINE

## 2023-07-06 PROCEDURE — 85027 COMPLETE CBC AUTOMATED: CPT | Performed by: FAMILY MEDICINE

## 2023-07-06 PROCEDURE — 3008F BODY MASS INDEX DOCD: CPT | Mod: CPTII,S$GLB,, | Performed by: FAMILY MEDICINE

## 2023-07-06 RX ORDER — SERTRALINE HYDROCHLORIDE 50 MG/1
50 TABLET, FILM COATED ORAL DAILY
Qty: 90 TABLET | Refills: 3 | Status: SHIPPED | OUTPATIENT
Start: 2023-07-06

## 2023-07-06 RX ORDER — LEVOTHYROXINE SODIUM 137 UG/1
137 TABLET ORAL
Qty: 90 TABLET | Refills: 3 | Status: SHIPPED | OUTPATIENT
Start: 2023-07-06

## 2023-07-06 RX ORDER — ROSUVASTATIN CALCIUM 5 MG/1
5 TABLET, COATED ORAL DAILY
Qty: 90 TABLET | Refills: 3 | Status: SHIPPED | OUTPATIENT
Start: 2023-07-06

## 2023-07-06 NOTE — PROGRESS NOTES
/70 (BP Location: Right arm, Patient Position: Sitting)   Pulse 67   Temp 97.7 °F (36.5 °C) (Oral)   Resp 20   Ht 6' (1.829 m)   Wt 95.7 kg (210 lb 15.7 oz)   LMP 2023   SpO2 98%   BMI 28.61 kg/m²       ===========    Chief Complaint: Follow-up          HPI    Thao Ybarra is a 43 y.o. female     here for    Annual Wellness/Preventative Exam.  Health maintenance reviewed with patient in detail inc any recent labs and studies and needs for future screening labs.  Age-appropriate vaccines and other age-appropriate screening studies reviewed with patient in detail.  Sleep health reviewed with patient.  Skin health regarding possible skin cancer screening reviewed with patient.  General regularity of bowel movements and urinations reviewed with patient including any possibility of urine leakage.  Vision screening reviewed with patient.    Has type 1 diabetes.  Well-controlled.  Last A1c little bit over a year ago was 5.9%.  Sees Endocrinology regularly.  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      Patient queried and denies any further complaints    Patient has MEDICAL HISTORY OF  Patient Active Problem List   Diagnosis    Controlled diabetes mellitus type 1 without complications    Hypothyroidism (acquired)    Insulin pump status    Vitamin D deficiency    BMI 33.0-33.9,adult    HLD (hyperlipidemia)       SURGICAL AND MEDICAL HISTORY: updated and reviewed.  Past Surgical History:   Procedure Laterality Date     SECTION      x2    COLONOSCOPY N/A 2022    Procedure: COLONOSCOPY;  Surgeon: Conner García MD;  Location: [address]);  Service: Endoscopy;  Laterality: N/A;   fully vaccinated; instructions to portal-st     ALLERGIES updated and reviewed.  Review of patient's allergies indicates:  No Known Allergies    CURRENT OUTPATIENT MEDICATIONS updated and reviewed    Current Outpatient Medications:     blood sugar diagnostic Strp, To  check BG 4 times daily, to use with insurance preferred meter., Disp: 200 strip, Rfl: 3    blood-glucose meter (CONTOUR LINK MISC), by Misc.(Non-Drug; Combo Route) route., Disp: , Rfl:     blood-glucose sensor (DEXCOM G6 SENSOR) Crystal, 1 Device by Misc.(Non-Drug; Combo Route) route every 10 days., Disp: 9 each, Rfl: 3    blood-glucose transmitter (DEXCOM G6 TRANSMITTER) Crystal, 1 Device by Misc.(Non-Drug; Combo Route) route every 3 (three) months., Disp: 1 each, Rfl: 3    insulin aspart U-100 (NOVOLOG U-100 INSULIN ASPART) 100 unit/mL injection, For use in insulin pump, max  units per day, Disp: 10 each, Rfl: 3    levothyroxine (SYNTHROID) 137 MCG Tab tablet, TAKE 1 TABLET BY MOUTH EVERY DAY BEFORE BREAKFAST, Disp: 90 tablet, Rfl: 0    rosuvastatin (CRESTOR) 5 MG tablet, TAKE 1 TABLET BY MOUTH EVERY DAY, Disp: 90 tablet, Rfl: 0    sertraline (ZOLOFT) 50 MG tablet, Take 1 tablet (50 mg total) by mouth once daily., Disp: 90 tablet, Rfl: 3    ergocalciferol (ERGOCALCIFEROL) 50,000 unit Cap, Take 1 capsule (50,000 Units total) by mouth every 7 days. For 8 weeks only then start over-the-counter vitamin-D 5000 international units p.o. daily . (Patient not taking: Reported on 7/6/2023), Disp: 8 capsule, Rfl: 0    glucagon, human recombinant, (GLUCAGON EMERGENCY KIT, HUMAN,) 1 mg SolR, Inject 1 mg into the muscle as needed., Disp: 1 each, Rfl: 1    insulin (LANTUS SOLOSTAR U-100 INSULIN) glargine 100 units/mL (3mL) SubQ pen, Inject 32 Units into the skin once daily. In case insulin pump fails, Disp: 3 mL, Rfl: 1    Review of Systems   Constitutional:  Negative for activity change, appetite change, chills, diaphoresis, fatigue, fever and unexpected weight change.   HENT:  Negative for congestion, ear discharge, ear pain, facial swelling, hearing loss, nosebleeds, postnasal drip, rhinorrhea, sinus pressure, sneezing, sore throat, tinnitus, trouble swallowing and voice change.    Eyes:  Negative for photophobia, pain,  discharge, redness, itching and visual disturbance.   Respiratory:  Negative for cough, chest tightness, shortness of breath and wheezing.    Cardiovascular:  Negative for chest pain, palpitations and leg swelling.   Gastrointestinal:  Negative for abdominal distention, abdominal pain, anal bleeding, blood in stool, constipation, diarrhea, nausea, rectal pain and vomiting.   Endocrine: Negative for cold intolerance, heat intolerance, polydipsia, polyphagia and polyuria.   Genitourinary:  Negative for difficulty urinating, dysuria and flank pain.   Musculoskeletal:  Negative for arthralgias, back pain, joint swelling, myalgias and neck pain.   Skin:  Negative for rash.   Neurological:  Negative for dizziness, tremors, seizures, syncope, speech difficulty, weakness, light-headedness, numbness and headaches.   Psychiatric/Behavioral:  Negative for behavioral problems, confusion, decreased concentration, dysphoric mood, sleep disturbance and suicidal ideas. The patient is not nervous/anxious and is not hyperactive.      /70 (BP Location: Right arm, Patient Position: Sitting)   Pulse 67   Temp 97.7 °F (36.5 °C) (Oral)   Resp 20   Ht 6' (1.829 m)   Wt 95.7 kg (210 lb 15.7 oz)   LMP 06/06/2023   SpO2 98%   BMI 28.61 kg/m²   Physical Exam  Vitals and nursing note reviewed.   Constitutional:       General: She is not in acute distress.     Appearance: Normal appearance. She is well-developed. She is not ill-appearing or toxic-appearing.   HENT:      Head: Normocephalic and atraumatic.      Right Ear: Tympanic membrane, ear canal and external ear normal.      Left Ear: Tympanic membrane, ear canal and external ear normal.      Nose: Nose normal.      Mouth/Throat:      Lips: Pink.      Mouth: Mucous membranes are moist.      Pharynx: No oropharyngeal exudate or posterior oropharyngeal erythema.   Eyes:      General: No scleral icterus.        Right eye: No discharge.         Left eye: No discharge.       Extraocular Movements: Extraocular movements intact.      Conjunctiva/sclera: Conjunctivae normal.   Cardiovascular:      Rate and Rhythm: Normal rate and regular rhythm.      Pulses: Normal pulses.      Heart sounds: Normal heart sounds. No murmur heard.  Pulmonary:      Effort: Pulmonary effort is normal. No respiratory distress.      Breath sounds: Normal breath sounds. No wheezing or rales.   Abdominal:      General: Bowel sounds are normal. There is no distension.      Palpations: Abdomen is soft. There is no mass.      Tenderness: There is no abdominal tenderness. There is no right CVA tenderness, left CVA tenderness, guarding or rebound.      Hernia: No hernia is present.   Musculoskeletal:      Cervical back: Normal range of motion and neck supple. No rigidity or tenderness.   Lymphadenopathy:      Cervical: No cervical adenopathy.   Skin:     General: Skin is warm and dry.   Neurological:      General: No focal deficit present.      Mental Status: She is alert. Mental status is at baseline.   Psychiatric:         Mood and Affect: Mood normal.         Behavior: Behavior normal. Behavior is cooperative.       ASSESSMENT/PLAN  There are no diagnoses linked to this encounter.        All lab results over past 2 years available reviewed inc labs and any cardiology or radiology studies.  Any new prescription medications gone over in detail including reason for taking the medication, the general mechanism of action, most common possible side effects and possible costs, etcetera.    Chronic conditions updated. Other than changes or additions as above, cont current medications and maintain follow-up with specialists if indicated.     Danny Harrington MD  A dictation device was used to produce this document. Use of such devices sometimes results in grammatical errors or replacement of words that sound similarly.

## 2023-07-07 RX ORDER — ERGOCALCIFEROL 1.25 MG/1
50000 CAPSULE ORAL
Qty: 8 CAPSULE | Refills: 0 | Status: SHIPPED | OUTPATIENT
Start: 2023-07-07

## 2023-07-18 PROBLEM — E78.2 MIXED HYPERLIPIDEMIA: Status: ACTIVE | Noted: 2023-07-18

## 2023-07-18 PROBLEM — E78.5 HLD (HYPERLIPIDEMIA): Status: RESOLVED | Noted: 2022-09-28 | Resolved: 2023-07-18

## 2023-10-02 ENCOUNTER — PATIENT MESSAGE (OUTPATIENT)
Dept: PRIMARY CARE CLINIC | Facility: CLINIC | Age: 43
End: 2023-10-02
Payer: COMMERCIAL

## 2023-10-02 DIAGNOSIS — R79.89 ABNORMAL TSH: ICD-10-CM

## 2023-10-02 DIAGNOSIS — E10.9 TYPE 1 DIABETES MELLITUS WITHOUT COMPLICATION: ICD-10-CM

## 2023-10-02 DIAGNOSIS — D72.819 LEUKOPENIA, UNSPECIFIED TYPE: Primary | ICD-10-CM

## 2023-10-02 DIAGNOSIS — E61.1 IRON DEFICIENCY: ICD-10-CM

## 2023-10-02 DIAGNOSIS — E53.8 FOLATE DEFICIENCY: ICD-10-CM

## 2023-10-02 DIAGNOSIS — E55.9 VITAMIN D DEFICIENCY: ICD-10-CM

## 2023-10-16 ENCOUNTER — OFFICE VISIT (OUTPATIENT)
Dept: ENDOCRINOLOGY | Facility: CLINIC | Age: 43
End: 2023-10-16
Payer: COMMERCIAL

## 2023-10-16 DIAGNOSIS — E78.2 MIXED HYPERLIPIDEMIA: ICD-10-CM

## 2023-10-16 DIAGNOSIS — E03.9 HYPOTHYROIDISM (ACQUIRED): ICD-10-CM

## 2023-10-16 DIAGNOSIS — E10.9 CONTROLLED DIABETES MELLITUS TYPE 1 WITHOUT COMPLICATIONS: Primary | ICD-10-CM

## 2023-10-16 DIAGNOSIS — Z96.41 INSULIN PUMP STATUS: ICD-10-CM

## 2023-10-16 PROCEDURE — 3044F PR MOST RECENT HEMOGLOBIN A1C LEVEL <7.0%: ICD-10-PCS | Mod: CPTII,95,, | Performed by: INTERNAL MEDICINE

## 2023-10-16 PROCEDURE — 1160F RVW MEDS BY RX/DR IN RCRD: CPT | Mod: CPTII,95,, | Performed by: INTERNAL MEDICINE

## 2023-10-16 PROCEDURE — 3066F PR DOCUMENTATION OF TREATMENT FOR NEPHROPATHY: ICD-10-PCS | Mod: CPTII,95,, | Performed by: INTERNAL MEDICINE

## 2023-10-16 PROCEDURE — 3044F HG A1C LEVEL LT 7.0%: CPT | Mod: CPTII,95,, | Performed by: INTERNAL MEDICINE

## 2023-10-16 PROCEDURE — 3061F NEG MICROALBUMINURIA REV: CPT | Mod: CPTII,95,, | Performed by: INTERNAL MEDICINE

## 2023-10-16 PROCEDURE — 3061F PR NEG MICROALBUMINURIA RESULT DOCUMENTED/REVIEW: ICD-10-PCS | Mod: CPTII,95,, | Performed by: INTERNAL MEDICINE

## 2023-10-16 PROCEDURE — 1159F PR MEDICATION LIST DOCUMENTED IN MEDICAL RECORD: ICD-10-PCS | Mod: CPTII,95,, | Performed by: INTERNAL MEDICINE

## 2023-10-16 PROCEDURE — 1160F PR REVIEW ALL MEDS BY PRESCRIBER/CLIN PHARMACIST DOCUMENTED: ICD-10-PCS | Mod: CPTII,95,, | Performed by: INTERNAL MEDICINE

## 2023-10-16 PROCEDURE — 99214 PR OFFICE/OUTPT VISIT, EST, LEVL IV, 30-39 MIN: ICD-10-PCS | Mod: 95,,, | Performed by: INTERNAL MEDICINE

## 2023-10-16 PROCEDURE — 99214 OFFICE O/P EST MOD 30 MIN: CPT | Mod: 95,,, | Performed by: INTERNAL MEDICINE

## 2023-10-16 PROCEDURE — 1159F MED LIST DOCD IN RCRD: CPT | Mod: CPTII,95,, | Performed by: INTERNAL MEDICINE

## 2023-10-16 PROCEDURE — 3066F NEPHROPATHY DOC TX: CPT | Mod: CPTII,95,, | Performed by: INTERNAL MEDICINE

## 2023-10-16 RX ORDER — INSULIN ASPART 100 [IU]/ML
INJECTION, SOLUTION INTRAVENOUS; SUBCUTANEOUS
Qty: 100 ML | Refills: 3 | Status: SHIPPED | OUTPATIENT
Start: 2023-10-16

## 2023-10-16 RX ORDER — BLOOD-GLUCOSE SENSOR
1 EACH MISCELLANEOUS
Qty: 9 EACH | Refills: 3 | Status: SHIPPED | OUTPATIENT
Start: 2023-10-16 | End: 2024-10-15

## 2023-10-16 RX ORDER — GLUCAGON INJECTION, SOLUTION 0.5 MG/.1ML
1 INJECTION, SOLUTION SUBCUTANEOUS
Qty: 0.2 ML | Refills: 3 | Status: SHIPPED | OUTPATIENT
Start: 2023-10-16

## 2023-10-16 RX ORDER — BLOOD-GLUCOSE TRANSMITTER
1 EACH MISCELLANEOUS
Qty: 1 EACH | Refills: 3 | Status: SHIPPED | OUTPATIENT
Start: 2023-10-16 | End: 2024-10-15

## 2023-10-16 NOTE — ASSESSMENT & PLAN NOTE
A1c at goal  Data from May with some lows in evening (end of her work-day) although she does not think this has been issue lately  Will open [employer] adrien to upload data and send new Dexcom share code so I can review in coming days  Continue current settings for now  Finds overnight is good wtihout sleep mode  Due to eye exam

## 2023-10-16 NOTE — ASSESSMENT & PLAN NOTE
Last TSH low  Repeat in coming weeks and if again low reduce LT4 dose (may need less in setting of weight loss)

## 2023-10-16 NOTE — PROGRESS NOTES
Pump Clinic Return Visit    Chief Complaint   Patient presents with    Diabetes         The patient location is: [place]  The chief complaint leading to consultation is: T1DM    Visit type: audiovisual    Face to Face time with patient: 15  20 minutes of total time spent on the encounter, which includes face to face time and non-face to face time preparing to see the patient (eg, review of tests), Obtaining and/or reviewing separately obtained history, Documenting clinical information in the electronic or other health record, Independently interpreting results (not separately reported) and communicating results to the patient/family/caregiver, or Care coordination (not separately reported).     Each patient to whom he or she provides medical services by telemedicine is:  (1) informed of the relationship between the physician and patient and the respective role of any other health care provider with respect to management of the patient; and (2) notified that he or she may decline to receive medical services by telemedicine and may withdraw from such care at any time.       HPI:Thao Ybarra is a 43 y.o. female with a past medical history of type 1 diabetes mellitus on an insulin pump.     No recent data to review at visit today. Did look back at data from May in pump    Liking pump better particularly automated basal  Not using sleep mode but finds not having lows and waking up at goal    Has had significant weight loss in the last year  Some work stress, doing a lot of yoga to help with this    Current diabetes regimen:  Pump: [employer] Control IQ    Pump Settings from May 2023  Midnight 0.725 u/hr 1u:40 mg/dL 1u:8.0 g 110 mg/dL  7:00 AM 1.250 u/hr 1u:40 mg/dL 1u:7.0 g 110 mg/dL  9:30 AM 1.000 u/hr 1u:40 mg/dL 1u:8.0 g 110 mg/dL  8:00 PM 1.000 u/hr 1u:40 mg/dL 1u:8.0 g 110 mg/dL  Calculated Total Daily Basal 22.7 units             Using manual boluses usually 1:10  Correction 1:40-50      Lab Results   Component Value Date     "HGBA1C 5.8 (H) 07/06/2023       Glucose Monitoring:  Meter/CGM: Dexcom G6- not data today        Hypoglycemic Episodes:  No severe episodes    Since last visit no severe hypoglycemic episodes requiring observer intervention  Does not have glucagon  Does have back-up basal    DKA: No    Screening / DM Complications:    Nephropathy:  ACEi/ARB: not taking  Lab Results   Component Value Date    MICALBCREAT 3.9 07/06/2023       Last Lipid Panel:  Statin: Taking  Lab Results   Component Value Date    LDLCALC 64.2 07/06/2023       Last foot exam Most Recent Foot Exam Date: Not Found  Last eye exam : 12/17/2021;  no laser surgery or DR    Hypothyroidism  Taking LT4 137 mcg daily. Stable dose for many years  No dose adjustment after last lab  Lab Results   Component Value Date    TSH 0.108 (L) 07/06/2023       No results found for: "TTGIGA"    Aspirin: no        Current Outpatient Medications:     blood sugar diagnostic Strp, To check BG 4 times daily, to use with insurance preferred meter., Disp: 200 strip, Rfl: 3    blood-glucose meter (CONTOUR LINK MISC), by Misc.(Non-Drug; Combo Route) route., Disp: , Rfl:     blood-glucose sensor (DEXCOM G6 SENSOR) Crystal, 1 Device by Misc.(Non-Drug; Combo Route) route every 10 days., Disp: 9 each, Rfl: 3    blood-glucose transmitter (DEXCOM G6 TRANSMITTER) Crystal, 1 Device by Misc.(Non-Drug; Combo Route) route every 3 (three) months., Disp: 1 each, Rfl: 3    diphth,pertus,acell,,tetanus (BOOSTRIX TDAP) 2.5-8-5 Lf-mcg-Lf/0.5mL Syrg injection, Inject into the muscle., Disp: 0.5 mL, Rfl: 0    ergocalciferol (ERGOCALCIFEROL) 50,000 unit Cap, Take 1 capsule (50,000 Units total) by mouth every 7 days. For 8 weeks then take over-the-counter vitamin-D 5000 IU daily, Disp: 8 capsule, Rfl: 0    glucagon (GVOKE HYPOPEN 2-PACK) 0.5 mg/0.1 mL AtIn, Inject 1 each into the skin as needed (hypoglycemia)., Disp: 1 each, Rfl: 3    insulin (LANTUS SOLOSTAR U-100 INSULIN) glargine 100 units/mL (3mL) SubQ pen, " Inject 32 Units into the skin once daily. In case insulin pump fails, Disp: 3 mL, Rfl: 1    insulin aspart U-100 (NOVOLOG U-100 INSULIN ASPART) 100 unit/mL injection, For use in insulin pump, max  units per day, Disp: 10 each, Rfl: 3    levothyroxine (SYNTHROID) 137 MCG Tab tablet, Take 1 tablet (137 mcg total) by mouth before breakfast., Disp: 90 tablet, Rfl: 3    pneumoc 20-katia conj-dip cr,PF, (PREVNAR 20, PF,) 0.5 mL Syrg injection, Inject into the muscle., Disp: 0.5 mL, Rfl: 0    rosuvastatin (CRESTOR) 5 MG tablet, Take 1 tablet (5 mg total) by mouth once daily., Disp: 90 tablet, Rfl: 3    sertraline (ZOLOFT) 50 MG tablet, Take 1 tablet (50 mg total) by mouth once daily., Disp: 90 tablet, Rfl: 3        Chemistry        Component Value Date/Time     07/06/2023 0837    K 4.0 07/06/2023 0837     07/06/2023 0837    CO2 24 07/06/2023 0837    BUN 5 (L) 07/06/2023 0837    CREATININE 0.8 07/06/2023 0837     (H) 07/06/2023 0837        Component Value Date/Time    CALCIUM 9.0 07/06/2023 0837    ALKPHOS 72 07/06/2023 0837    AST 24 07/06/2023 0837    ALT 19 07/06/2023 0837    BILITOT 0.4 07/06/2023 0837    ESTGFRAFRICA >60.0 06/14/2022 0846    EGFRNONAA >60.0 06/14/2022 0846           Lab Results   Component Value Date    CHOL 131 07/06/2023    CHOL 135 02/22/2022    CHOL 167 11/25/2019     Lab Results   Component Value Date    HDL 60 07/06/2023    HDL 67 02/22/2022    HDL 67 11/25/2019     Lab Results   Component Value Date    LDLCALC 64.2 07/06/2023    LDLCALC 58.0 (L) 02/22/2022    LDLCALC 92.8 11/25/2019     Lab Results   Component Value Date    TRIG 34 07/06/2023    TRIG 50 02/22/2022    TRIG 36 11/25/2019     Lab Results   Component Value Date    CHOLHDL 45.8 07/06/2023    CHOLHDL 49.6 02/22/2022    CHOLHDL 40.1 11/25/2019           Assessment/Plan  Controlled diabetes mellitus type 1 without complications  A1c at goal  Data from May with some lows in evening (end of her work-day) although  she does not think this has been issue lately  Will open [employer] to upload data and send new Dexcom share code so I can review in coming days  Continue current settings for now  Finds overnight is good wtihout sleep mode  Due to eye exam    Hypothyroidism (acquired)  Last TSH low  Repeat in coming weeks and if again low reduce LT4 dose (may need less in setting of weight loss)    Insulin pump status  As above    Mixed hyperlipidemia  LDL excellent on statin      Glucagon kit:  Yes    FOLLOW UP  No follow-ups on file.       Pump backup plan    If the insulin pump is non functional and discontinued for anticipated more than 20 hours, please give daily injections of:  Long acting insulin: lantus 25 units daily  Short acting insulin:  novolog with carb ratio of 1 unit per 8 grams and correction 1 unit per 40 above 120    For any technical insulin pump issues, please contact the insulin pump company; the toll free number is printed on the label on the back of the insulin pump.

## 2023-10-19 ENCOUNTER — PATIENT MESSAGE (OUTPATIENT)
Dept: ENDOCRINOLOGY | Facility: CLINIC | Age: 43
End: 2023-10-19
Payer: COMMERCIAL

## 2023-10-23 ENCOUNTER — PATIENT MESSAGE (OUTPATIENT)
Dept: ENDOCRINOLOGY | Facility: CLINIC | Age: 43
End: 2023-10-23
Payer: COMMERCIAL

## 2023-12-06 ENCOUNTER — HOSPITAL ENCOUNTER (OUTPATIENT)
Dept: RADIOLOGY | Facility: HOSPITAL | Age: 43
Discharge: HOME | End: 2023-12-06
Attending: FAMILY MEDICINE
Payer: COMMERCIAL

## 2023-12-06 VITALS — HEIGHT: 72 IN | WEIGHT: 210 LBS | BODY MASS INDEX: 28.44 KG/M2

## 2023-12-06 DIAGNOSIS — Z12.31 OTHER SCREENING MAMMOGRAM: ICD-10-CM

## 2023-12-06 PROCEDURE — 77067 MAMMO DIGITAL SCREENING BILAT WITH TOMO: ICD-10-PCS | Mod: 26,,, | Performed by: RADIOLOGY

## 2023-12-06 PROCEDURE — 77063 MAMMO DIGITAL SCREENING BILAT WITH TOMO: ICD-10-PCS | Mod: 26,,, | Performed by: RADIOLOGY

## 2023-12-06 PROCEDURE — 77067 SCR MAMMO BI INCL CAD: CPT | Mod: 26,,, | Performed by: RADIOLOGY

## 2023-12-06 PROCEDURE — 77067 SCR MAMMO BI INCL CAD: CPT | Mod: TC

## 2023-12-06 PROCEDURE — 77063 BREAST TOMOSYNTHESIS BI: CPT | Mod: 26,,, | Performed by: RADIOLOGY

## 2023-12-12 ENCOUNTER — PATIENT MESSAGE (OUTPATIENT)
Dept: PRIMARY CARE CLINIC | Facility: CLINIC | Age: 43
End: 2023-12-12
Payer: COMMERCIAL

## 2024-03-18 ENCOUNTER — TELEPHONE (OUTPATIENT)
Dept: SPORTS MEDICINE | Facility: CLINIC | Age: 44
End: 2024-03-18

## 2024-03-18 ENCOUNTER — HOSPITAL ENCOUNTER (OUTPATIENT)
Dept: RADIOLOGY | Facility: HOSPITAL | Age: 44
Discharge: HOME | End: 2024-03-18
Attending: PHYSICIAN ASSISTANT
Payer: COMMERCIAL

## 2024-03-18 ENCOUNTER — OFFICE VISIT (OUTPATIENT)
Dept: SPORTS MEDICINE | Facility: CLINIC | Age: 44
End: 2024-03-18
Payer: COMMERCIAL

## 2024-03-18 VITALS
SYSTOLIC BLOOD PRESSURE: 110 MMHG | BODY MASS INDEX: 26.91 KG/M2 | DIASTOLIC BLOOD PRESSURE: 68 MMHG | HEART RATE: 68 BPM | WEIGHT: 198.44 LBS

## 2024-03-18 DIAGNOSIS — M25.561 RIGHT KNEE PAIN, UNSPECIFIED CHRONICITY: ICD-10-CM

## 2024-03-18 DIAGNOSIS — M23.91 ACUTE INTERNAL DERANGEMENT OF RIGHT KNEE: Primary | ICD-10-CM

## 2024-03-18 PROCEDURE — 3074F SYST BP LT 130 MM HG: CPT | Mod: CPTII,S$GLB,, | Performed by: PHYSICIAN ASSISTANT

## 2024-03-18 PROCEDURE — 73564 X-RAY EXAM KNEE 4 OR MORE: CPT | Mod: TC,RT

## 2024-03-18 PROCEDURE — 1159F MED LIST DOCD IN RCRD: CPT | Mod: CPTII,S$GLB,, | Performed by: PHYSICIAN ASSISTANT

## 2024-03-18 PROCEDURE — 73562 X-RAY EXAM OF KNEE 3: CPT | Mod: TC,LT

## 2024-03-18 PROCEDURE — 99999 PR PBB SHADOW E&M-EST. PATIENT-LVL IV: CPT | Mod: PBBFAC,,, | Performed by: PHYSICIAN ASSISTANT

## 2024-03-18 PROCEDURE — 99203 OFFICE O/P NEW LOW 30 MIN: CPT | Mod: S$GLB,,, | Performed by: PHYSICIAN ASSISTANT

## 2024-03-18 PROCEDURE — 3078F DIAST BP <80 MM HG: CPT | Mod: CPTII,S$GLB,, | Performed by: PHYSICIAN ASSISTANT

## 2024-03-18 PROCEDURE — 3008F BODY MASS INDEX DOCD: CPT | Mod: CPTII,S$GLB,, | Performed by: PHYSICIAN ASSISTANT

## 2024-03-18 PROCEDURE — 1160F RVW MEDS BY RX/DR IN RCRD: CPT | Mod: CPTII,S$GLB,, | Performed by: PHYSICIAN ASSISTANT

## 2024-03-18 PROCEDURE — 73564 X-RAY EXAM KNEE 4 OR MORE: CPT | Mod: 26,RT,, | Performed by: RADIOLOGY

## 2024-03-18 RX ORDER — LIDOCAINE 50 MG/G
1 PATCH TOPICAL DAILY
Qty: 15 PATCH | Refills: 0 | Status: SHIPPED | OUTPATIENT
Start: 2024-03-18 | End: 2024-06-04

## 2024-03-18 RX ORDER — DICLOFENAC SODIUM 10 MG/G
2 GEL TOPICAL 4 TIMES DAILY
Qty: 100 G | Refills: 0 | Status: ON HOLD | OUTPATIENT
Start: 2024-03-18 | End: 2024-03-28 | Stop reason: HOSPADM

## 2024-03-18 NOTE — LETTER
Patient: Thao Ybarra   YOB: 1980   Clinic Number: [id number]   Today's Date: March 18, 2024        Certificate to Return to Work     Thao Meng was seen by Patrice Ching PA-C on 3/18/2024.    Thao Meng can return to work virtually until further notice.     If you have any questions or concerns, please feel free to contact the office at [phone].    Thank you.    Patrice Ching PA-C        Signature:

## 2024-03-18 NOTE — PROGRESS NOTES
CC: Right knee pain    43 y.o. Female who presents as a new patient to me. She works as a pediatric neurologist. Complaint is right knee pain x 6 days prior when she was in yoga and felt a pop in her knee. Seems like she was in more of a figure 4 position. She says that after the class her knee got progressively swollen and stiff. Pain localizes medially.  Endorses swelling. Endorses mechanical symptoms. Denies instability. Worse with ambulating, knee flexion. Better with rest. Treatment thus far has included rest, activity modifications, oral medications. Here today to discuss diagnosis and treatment options.      PMHx notable for DM1. L knee meniscus repair cs menisectomy .  Negative for tobacco.   Positive for diabetes. Last A1C: 5.8    Pain Score:   4    REVIEW OF SYSTEMS:   Constitution: Negative. Negative for chills, fever and night sweats.    Hematologic/Lymphatic: Negative for bleeding problem. Does not bruise/bleed easily.   Skin: Negative for dry skin, itching and rash.   Musculoskeletal: Negative for falls. Positive for right knee pain and muscle weakness.     All other review of symptoms were reviewed and found to be noncontributory.     PAST MEDICAL HISTORY:   Past Medical History:   Diagnosis Date    Diabetes mellitus type I     Hypothyroidism        PAST SURGICAL HISTORY:   Past Surgical History:   Procedure Laterality Date     SECTION      x2    COLONOSCOPY N/A 2022    Procedure: COLONOSCOPY;  Surgeon: Conner García MD;  Location: [address]);  Service: Endoscopy;  Laterality: N/A;   fully vaccinated; instructions to portal-st       FAMILY HISTORY:   Family History   Problem Relation Age of Onset    No Known Problems Mother     Cancer Father 79        colon CA    No Known Problems Brother     No Known Problems Daughter     No Known Problems Son     Diabetes Neg Hx     Ovarian cancer Neg Hx     Colon cancer Neg Hx     Breast cancer Neg Hx        SOCIAL HISTORY:    Social History     Socioeconomic History    Marital status:    Occupational History    Occupation: EPILEPSY MD   Tobacco Use    Smoking status: Never    Smokeless tobacco: Never   Substance and Sexual Activity    Alcohol use: No    Drug use: No    Sexual activity: Yes     Partners: Male     Birth control/protection: None   Social History Narrative    Currently breastfeeding but weaning.        MEDICATIONS:     Current Outpatient Medications:     blood sugar diagnostic Strp, To check BG 4 times daily, to use with insurance preferred meter., Disp: 200 strip, Rfl: 3    blood-glucose meter (CONTOUR LINK MISC), by Misc.(Non-Drug; Combo Route) route., Disp: , Rfl:     blood-glucose sensor (DEXCOM G6 SENSOR) Crystal, 1 Device by Misc.(Non-Drug; Combo Route) route every 10 days., Disp: 9 each, Rfl: 3    blood-glucose transmitter (DEXCOM G6 TRANSMITTER) Crystal, 1 Device by Misc.(Non-Drug; Combo Route) route every 3 (three) months., Disp: 1 each, Rfl: 3    ergocalciferol (ERGOCALCIFEROL) 50,000 unit Cap, Take 1 capsule (50,000 Units total) by mouth every 7 days. For 8 weeks then take over-the-counter vitamin-D 5000 IU daily, Disp: 8 capsule, Rfl: 0    glucagon (GVOKE HYPOPEN 2-PACK) 0.5 mg/0.1 mL AtIn, Inject 1 each into the skin as needed (hypoglycemia)., Disp: 0.2 mL, Rfl: 3    insulin aspart U-100 (NOVOLOG U-100 INSULIN ASPART) 100 unit/mL injection, For use in insulin pump, max Total Daily Dose 100 units per day, Disp: 100 mL, Rfl: 3    levothyroxine (SYNTHROID) 137 MCG Tab tablet, Take 1 tablet (137 mcg total) by mouth before breakfast., Disp: 90 tablet, Rfl: 3    rosuvastatin (CRESTOR) 5 MG tablet, Take 1 tablet (5 mg total) by mouth once daily., Disp: 90 tablet, Rfl: 3    sertraline (ZOLOFT) 50 MG tablet, Take 1 tablet (50 mg total) by mouth once daily., Disp: 90 tablet, Rfl: 3    diclofenac sodium (VOLTAREN) 1 % Gel, Apply 2 grams topically 4 (four) times daily., Disp: 100 g, Rfl: 0    diphth,pertus,acell,,tetanus  (BOOSTRIX TDAP) 2.5-8-5 Lf-mcg-Lf/0.5mL Syrg injection, Inject into the muscle., Disp: 0.5 mL, Rfl: 0    insulin (LANTUS SOLOSTAR U-100 INSULIN) glargine 100 units/mL (3mL) SubQ pen, Inject 32 Units into the skin once daily. In case insulin pump fails, Disp: 3 mL, Rfl: 1    LIDOcaine (LIDODERM) 5 %, Place 1 patch onto the skin once daily. Remove & Discard patch within 12 hours or as directed by MD, Disp: 15 patch, Rfl: 0    pneumoc 20-katia conj-dip cr,PF, (PREVNAR 20, PF,) 0.5 mL Syrg injection, Inject into the muscle., Disp: 0.5 mL, Rfl: 0    ALLERGIES:   Review of patient's allergies indicates:  No Known Allergies     PHYSICAL EXAMINATION:  /68   Pulse 68   Wt 90 kg (198 lb 6.6 oz)   BMI 26.91 kg/m²   General: Well-developed well-nourished 43 y.o. femalein no acute distress   Cardiovascular: Regular rhythm by palpation of distal pulse, normal color and temperature, no concerning varicosities on symptomatic side   Lungs: No labored breathing or wheezing appreciated   Neuro: Alert and oriented ×3   Psychiatric: well oriented to person, place and time, demonstrates normal mood and affect   Skin: No rashes, lesions or ulcers, normal temperature, turgor, and texture on involved extremity    Ortho/SPM Exam  Examination of the right knee demonstrates intact extensor mechanism. No effusion or prepatellar swelling. Lateral patellar tracking. No patellar apprehension.Positive patellar crepitus. Negative patellar grind. Full extension. Flexion to 115. Pain with forced flexion. Prominent tenderness along the medial joint line. Positive Zee's medially for pain. Negative Lachman. Stable to varus/valgus stress testing at 0 and 30 deg. Negative posterior drawer. Ligamentously stable.    IMAGING:  X-rays including standing, weight bearing AP and flexion bilateral knees, RIGHT knee lateral and sunrise views ordered and images reviewed by me show:    FINDINGS:  Knee four views right.     No fracture, dislocation, bone  destruction, or OCD seen.    ASSESSMENT:      ICD-10-CM ICD-9-CM   1. Acute internal derangement of right knee  M23.91 717.9   2. Right knee pain, unspecified chronicity  M25.561 719.46       PLAN:     -Findings and treatment options were discussed with the patient. Concern for medial meniscus pathology. Will get further imaging to evaluate.  -We have discussed a variety of treatment options including medications, injections, physical therapy and other alternative treatments. I also explained the indications, risks and benefits of surgery. Given the patient's hx and examination, we should proceed with MRI at this time. Pt agrees with treatment plan.    I made the decision to obtain old records of the patient including previous notes and imaging. I independently reviewed and interpreted lab results today as well as prior imaging.     1. MRI right knee to assess for medial meniscus and cartilage pathology.    2. Ice compress to the affected area 2-3x a day for 15-20 minutes as needed for pain management.  3. Continue gentle ROM of the knee. Ambulatory referral to PT at [facility].  4. 1% Diclofenac gel as well as lidocaine patches daily PRN for pain management. OTC antiinflammatories as needed for pain.  5. RTC to see me for follow-up and MRI results.    All of the patient's questions were answered and the patient will contact us if they have any questions or concerns in the interim.

## 2024-03-22 ENCOUNTER — PATIENT MESSAGE (OUTPATIENT)
Dept: PRIMARY CARE CLINIC | Facility: CLINIC | Age: 44
End: 2024-03-22
Payer: COMMERCIAL

## 2024-03-22 ENCOUNTER — TELEPHONE (OUTPATIENT)
Dept: SPORTS MEDICINE | Facility: CLINIC | Age: 44
End: 2024-03-22
Payer: COMMERCIAL

## 2024-03-22 ENCOUNTER — PATIENT MESSAGE (OUTPATIENT)
Dept: SPORTS MEDICINE | Facility: CLINIC | Age: 44
End: 2024-03-22
Payer: COMMERCIAL

## 2024-03-22 DIAGNOSIS — E10.9 TYPE 1 DIABETES MELLITUS WITHOUT COMPLICATION: Primary | ICD-10-CM

## 2024-03-22 NOTE — TELEPHONE ENCOUNTER
Called patient in regards to appointment for today for MRI. Patient stated that the MRI machine wouldn't turn on. Rescheduled her appointment to tomorrow at [facility], [place]. Patient understood everything and didn't have any questions

## 2024-03-22 NOTE — TELEPHONE ENCOUNTER
LOV /Annual 7/6/23  RTC     Patient is requesting an external referral for endocrinology for diabetes. She said she has run into some patient privacy issues and would like to switch.

## 2024-03-23 ENCOUNTER — HOSPITAL ENCOUNTER (OUTPATIENT)
Dept: RADIOLOGY | Facility: OTHER | Age: 44
Discharge: HOME | End: 2024-03-23
Attending: PHYSICIAN ASSISTANT
Payer: COMMERCIAL

## 2024-03-23 DIAGNOSIS — M23.91 ACUTE INTERNAL DERANGEMENT OF RIGHT KNEE: ICD-10-CM

## 2024-03-23 PROCEDURE — 73721 MRI JNT OF LWR EXTRE W/O DYE: CPT | Mod: 26,RT,, | Performed by: INTERNAL MEDICINE

## 2024-03-23 PROCEDURE — 73721 MRI JNT OF LWR EXTRE W/O DYE: CPT | Mod: TC,RT

## 2024-03-25 NOTE — PROGRESS NOTES
CC: Right knee pain    43 y.o. Female who presents as a new patient to me. 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  Chief complaint of a right knee injury.  She sustained 2 weeks ago while and yoga.  She was in the figure 4 position and when attempting to get up she felt a painful pop in the knee.  She had subsequent swelling and has persistent pain with mechanical symptoms.  Medially based.  Denies any significant pre-existing issues.  No prior injuries.  No ipsilateral groin or hip pain.  No back or radicular symptoms.  MRI was recently ordered.  She now presents for follow-up discussion.  Current pain is daily activity limiting.  Worse with twisting and turning.  Bothersome going up and down stairs and with deep squats.  Activity limiting.  Can not stand or walk for prolonged periods.    Referred by colleague Patrice Martini PA-C    PMHx notable for DMI, hypothyroidism.   Negative for tobacco.   Positive for diabetes. Last A1C: 5.8 23    REVIEW OF SYSTEMS:   Constitution: Negative. Negative for chills, fever and night sweats.    Hematologic/Lymphatic: Negative for bleeding problem. Does not bruise/bleed easily.   Skin: Negative for dry skin, itching and rash.   Musculoskeletal: Negative for falls. Positive for right knee pain and muscle weakness.     All other review of symptoms were reviewed and found to be noncontributory.     PAST MEDICAL HISTORY:   Past Medical History:   Diagnosis Date    Diabetes mellitus type I     Hypothyroidism      PAST SURGICAL HISTORY:   Past Surgical History:   Procedure Laterality Date     SECTION      x2    COLONOSCOPY N/A 2022    Procedure: COLONOSCOPY;  Surgeon: Conner García MD;  Location: [facility] ([address] [place] [address];  Service: Endoscopy;  Laterality: N/A;   fully vaccinated; instructions to portal-st     FAMILY HISTORY:   Family History   Problem Relation Age of Onset    No Known Problems Mother     Cancer Father 79        colon CA    No Known  Problems Brother     No Known Problems Daughter     No Known Problems Son     Diabetes Neg Hx     Ovarian cancer Neg Hx     Colon cancer Neg Hx     Breast cancer Neg Hx      SOCIAL HISTORY:   Social History     Socioeconomic History    Marital status:    Occupational History    Occupation: EPILEPSY MD   Tobacco Use    Smoking status: Never    Smokeless tobacco: Never   Substance and Sexual Activity    Alcohol use: No    Drug use: No    Sexual activity: Yes     Partners: Male     Birth control/protection: None   Social History Narrative    Currently breastfeeding but weaning.      MEDICATIONS:     Current Outpatient Medications:     blood sugar diagnostic Strp, To check BG 4 times daily, to use with insurance preferred meter., Disp: 200 strip, Rfl: 3    blood-glucose meter (CONTOUR LINK MISC), by Misc.(Non-Drug; Combo Route) route., Disp: , Rfl:     blood-glucose sensor (DEXCOM G6 SENSOR) Crystal, 1 Device by Misc.(Non-Drug; Combo Route) route every 10 days., Disp: 9 each, Rfl: 3    blood-glucose transmitter (DEXCOM G6 TRANSMITTER) Crystal, 1 Device by Misc.(Non-Drug; Combo Route) route every 3 (three) months., Disp: 1 each, Rfl: 3    diclofenac sodium (VOLTAREN) 1 % Gel, Apply 2 grams topically 4 (four) times daily., Disp: 100 g, Rfl: 0    diphth,pertus,acell,,tetanus (BOOSTRIX TDAP) 2.5-8-5 Lf-mcg-Lf/0.5mL Syrg injection, Inject into the muscle., Disp: 0.5 mL, Rfl: 0    ergocalciferol (ERGOCALCIFEROL) 50,000 unit Cap, Take 1 capsule (50,000 Units total) by mouth every 7 days. For 8 weeks then take over-the-counter vitamin-D 5000 IU daily, Disp: 8 capsule, Rfl: 0    glucagon (GVOKE HYPOPEN 2-PACK) 0.5 mg/0.1 mL AtIn, Inject 1 each into the skin as needed (hypoglycemia)., Disp: 0.2 mL, Rfl: 3    insulin aspart U-100 (NOVOLOG U-100 INSULIN ASPART) 100 unit/mL injection, For use in insulin pump, max Total Daily Dose 100 units per day, Disp: 100 mL, Rfl: 3    levothyroxine (SYNTHROID) 137 MCG Tab tablet, Take 1 tablet  (137 mcg total) by mouth before breakfast., Disp: 90 tablet, Rfl: 3    LIDOcaine (LIDODERM) 5 %, Place 1 patch onto the skin once daily. Remove & Discard patch within 12 hours or as directed by MD, Disp: 15 patch, Rfl: 0    pneumoc 20-katia conj-dip cr,PF, (PREVNAR 20, PF,) 0.5 mL Syrg injection, Inject into the muscle., Disp: 0.5 mL, Rfl: 0    rosuvastatin (CRESTOR) 5 MG tablet, Take 1 tablet (5 mg total) by mouth once daily., Disp: 90 tablet, Rfl: 3    sertraline (ZOLOFT) 50 MG tablet, Take 1 tablet (50 mg total) by mouth once daily., Disp: 90 tablet, Rfl: 3    insulin (LANTUS SOLOSTAR U-100 INSULIN) glargine 100 units/mL (3mL) SubQ pen, Inject 32 Units into the skin once daily. In case insulin pump fails, Disp: 3 mL, Rfl: 1    ALLERGIES:   Review of patient's allergies indicates:  No Known Allergies     PHYSICAL EXAMINATION:  /61   Pulse 69   Ht 6' (1.829 m)   Wt 84 kg (185 lb 3 oz)   BMI 25.12 kg/m²   General: Well-developed well-nourished 43 y.o. femalein no acute distress   Cardiovascular: Regular rhythm by palpation of distal pulse, normal color and temperature, no concerning varicosities on symptomatic side   Lungs: No labored breathing or wheezing appreciated   Neuro: Alert and oriented ×3   Psychiatric: well oriented to person, place and time, demonstrates normal mood and affect   Skin: No rashes, lesions or ulcers, normal temperature, turgor, and texture on involved extremity    Ortho/SPM Exam  Examination of the right knee demonstrates intact extensor mechanism.  Trace effusion.  Central to mild lateral patellar tracking.  No significant patellar facet tenderness.  Negative patellar crepitus and grind.  Exquisite pain to palpation over the medial joint line with guarding and pain on Zee's testing.  No lateral joint line tenderness.  Negative patellar apprehension.  One quadrant lateral and medial glide.  Pain medially with forced flexion and extension.  Full passive extension, flexion to  135-140 degrees.  Pain medially with forced flexion.  Negative Lachman's.  Negative posterior drawer.  Stable to varus and valgus stress.  No pain with passive internal or external rotation of the right hip.    IMAGING:  Prior X-rays 03/18/24 including standing, weight bearing AP and flexion bilateral knees, RIGHT knee lateral and sunrise views ordered and images reviewed by me show:    No acute findings.  Well-maintained joint spaces.    Prior MRI right knee 03/23/24 reviewed by me and discussed with patient. Study shows:   Complex medial meniscal tear, with meniscal fragments protruding into the posteromedial joint space and medial tibial gutter.  Bone marrow contusion in the anteromedial tibial plateau.  Small joint effusion.  Small Baker's cyst with adjacent soft tissue edema suggesting inflammation or partial rupture.    On my read there is a displaced meniscal fragment within the medial tibial gutter with local bone marrow edema around the medial tibial plateau margin.    ASSESSMENT:      ICD-10-CM ICD-9-CM   1. Complex tear of medial meniscus of right knee as current injury, initial encounter  S83.231A 836.0   2. Chondromalacia of right knee  M94.261 717.7       PLAN:     Findings were discussed with the patient.  She has a complex medial meniscus tear with evidence of unstable torn meniscal flaps and local abrasive findings to the medial tibial plateau.  Significant pain on exam which correlates.  Discussed treatment options.  I have recommended surgery for this complex medial meniscus tear with unstable flaps.  She is mechanical symptoms by history and on exam.  We discussed the details of arthroscopic intervention to include the expected partial medial meniscectomy, chondroplasty.  Discussed the unlikely scenario of indicated medial meniscus repair.  She understands and would like to proceed according to the total overall long-term health of her knee.  She would like to proceed as soon as possible.  We  will get her scheduled for later this week.  Discussed the expected postop rehab and recovery course.    Plan is right knee arthroscopic partial medial meniscectomy, chondroplasty    No preop clearance needed.    Informed Consent:    The details of the surgical procedure were explained, including the location of probable incisions and a description of possible hardware and/or grafts to be used. Alternatives to both operative and non-operative options with associated risks and benefits were discussed. The patient understands the likely convalescence after surgery and, in particular, the expected postop rehab and recovery course. The outlined risks and potential complications of the proposed procedure include but are not limited to: infection, poor wound healing, scarring, deformity, stiffness, swelling, continued or recurrent pain, instability, hardware or prosthetic failure if implanted, symptomatic hardware requiring removal, dislocation, weakness, neurovascular injury, numbness, chronic regional pain disorder, tissue nonhealing/irreparability/retear, subsequent contralateral limb injury or pathology, chondral injury, arthritis, fracture, blood clot formation, inability to return to previous level of activity, anesthetic or regional block complication up to death, need for additional procedure as indicated intraoperatively, and potential need for further surgery.    The patient was also informed and understands that the risks of surgery are greater for patients with a current condition or history of heart disease, obesity, clotting disorders, recurrent infections, steroid use, current or past smoking, and factors such as sedentary lifestyle and noncompliance with medications, therapy or follow-up. The degree of the increased risk is hard to estimate with any degree of precision. If applicable, smoking cessation was discussed.     All questions were answered. The patient has verbalized understanding of these issues  and wishes to proceed with the surgery as discussed.    Procedures

## 2024-03-26 ENCOUNTER — OFFICE VISIT (OUTPATIENT)
Dept: SPORTS MEDICINE | Facility: CLINIC | Age: 44
End: 2024-03-26
Payer: COMMERCIAL

## 2024-03-26 ENCOUNTER — PATIENT MESSAGE (OUTPATIENT)
Dept: PREADMISSION TESTING | Facility: HOSPITAL | Age: 44
End: 2024-03-26
Payer: COMMERCIAL

## 2024-03-26 VITALS
SYSTOLIC BLOOD PRESSURE: 102 MMHG | HEIGHT: 72 IN | HEART RATE: 69 BPM | WEIGHT: 185.19 LBS | BODY MASS INDEX: 25.08 KG/M2 | DIASTOLIC BLOOD PRESSURE: 61 MMHG

## 2024-03-26 VITALS
SYSTOLIC BLOOD PRESSURE: 102 MMHG | WEIGHT: 185.19 LBS | HEART RATE: 69 BPM | BODY MASS INDEX: 25.08 KG/M2 | DIASTOLIC BLOOD PRESSURE: 61 MMHG | HEIGHT: 72 IN

## 2024-03-26 DIAGNOSIS — S83.231A COMPLEX TEAR OF MEDIAL MENISCUS OF RIGHT KNEE AS CURRENT INJURY, INITIAL ENCOUNTER: Primary | ICD-10-CM

## 2024-03-26 DIAGNOSIS — M94.261 CHONDROMALACIA OF RIGHT KNEE: ICD-10-CM

## 2024-03-26 DIAGNOSIS — S83.231A COMPLEX TEAR OF MEDIAL MENISCUS OF RIGHT KNEE: ICD-10-CM

## 2024-03-26 DIAGNOSIS — S83.241A ACUTE MEDIAL MENISCUS TEAR OF RIGHT KNEE, INITIAL ENCOUNTER: Primary | ICD-10-CM

## 2024-03-26 PROCEDURE — 99999 PR PBB SHADOW E&M-EST. PATIENT-LVL IV: CPT | Mod: PBBFAC,,, | Performed by: PHYSICIAN ASSISTANT

## 2024-03-26 PROCEDURE — 3008F BODY MASS INDEX DOCD: CPT | Mod: CPTII,S$GLB,, | Performed by: ORTHOPAEDIC SURGERY

## 2024-03-26 PROCEDURE — 3074F SYST BP LT 130 MM HG: CPT | Mod: CPTII,S$GLB,, | Performed by: ORTHOPAEDIC SURGERY

## 2024-03-26 PROCEDURE — 99499 UNLISTED E&M SERVICE: CPT | Mod: S$GLB,,, | Performed by: PHYSICIAN ASSISTANT

## 2024-03-26 PROCEDURE — 99215 OFFICE O/P EST HI 40 MIN: CPT | Mod: S$GLB,,, | Performed by: ORTHOPAEDIC SURGERY

## 2024-03-26 PROCEDURE — 1159F MED LIST DOCD IN RCRD: CPT | Mod: CPTII,S$GLB,, | Performed by: ORTHOPAEDIC SURGERY

## 2024-03-26 PROCEDURE — 99999 PR PBB SHADOW E&M-EST. PATIENT-LVL III: CPT | Mod: PBBFAC,,, | Performed by: ORTHOPAEDIC SURGERY

## 2024-03-26 PROCEDURE — 3078F DIAST BP <80 MM HG: CPT | Mod: CPTII,S$GLB,, | Performed by: ORTHOPAEDIC SURGERY

## 2024-03-26 RX ORDER — ASPIRIN 81 MG/1
81 TABLET ORAL 2 TIMES DAILY
Qty: 28 TABLET | Refills: 0 | Status: SHIPPED | OUTPATIENT
Start: 2024-03-26 | End: 2024-06-04

## 2024-03-26 RX ORDER — ONDANSETRON 4 MG/1
4 TABLET, ORALLY DISINTEGRATING ORAL EVERY 8 HOURS PRN
Qty: 30 TABLET | Refills: 0 | Status: SHIPPED | OUTPATIENT
Start: 2024-03-26 | End: 2024-06-04

## 2024-03-26 RX ORDER — SODIUM CHLORIDE 9 MG/ML
INJECTION, SOLUTION INTRAVENOUS CONTINUOUS
Status: CANCELLED | OUTPATIENT
Start: 2024-03-26

## 2024-03-26 RX ORDER — OXYCODONE HYDROCHLORIDE 5 MG/1
5-10 TABLET ORAL
Qty: 28 TABLET | Refills: 0 | Status: SHIPPED | OUTPATIENT
Start: 2024-03-26 | End: 2024-06-04

## 2024-03-26 RX ORDER — CEFAZOLIN SODIUM 2 G/50ML
2 SOLUTION INTRAVENOUS
Status: CANCELLED | OUTPATIENT
Start: 2024-03-26

## 2024-03-26 NOTE — H&P
Thao Ybarra  is here for a completion of her perioperative paperwork. she  Is scheduled to undergo right knee arthroscopic partial medial meniscectomy, chondroplasty  on 3/28/2024.  She is a healthy individual and does not need clearance for this procedure.     Risks, indications and benefits of the surgical procedure were discussed with the patient. All questions with regard to surgery, rehab, expected return to functional activities, activities of daily living and recreational endeavors were answered to her satisfaction.    Discussed COVID-19 with the patient, they are aware of our current policies and procedures, were given the option of delaying surgery, and they elect to proceed.    Patient was informed and understands the risks of surgery are greater for patients with a current condition or hx of heart disease, obesity, clotting disorders, recurrent infections, steroid use, current or past smoking, and factors such as sedentary lifestyle and noncompliance with medications, therapy or f/u. The degree of the increased risk is hard to estimate w/ any degree of precision.    Once no other questions were asked, a brief history and physical exam was then performed.    PAST MEDICAL HISTORY:   Past Medical History:   Diagnosis Date    Diabetes mellitus type I     Hypothyroidism      PAST SURGICAL HISTORY:   Past Surgical History:   Procedure Laterality Date     SECTION      x2    COLONOSCOPY N/A 2022    Procedure: COLONOSCOPY;  Surgeon: Conner García MD;  Location: [facility] ([address] [place] [address]);  Service: Endoscopy;  Laterality: N/A;   fully vaccinated; instructions to portal-st     FAMILY HISTORY:   Family History   Problem Relation Age of Onset    No Known Problems Mother     Cancer Father 79        colon CA    No Known Problems Brother     No Known Problems Daughter     No Known Problems Son     Diabetes Neg Hx     Ovarian cancer Neg Hx     Colon cancer Neg Hx     Breast cancer Neg Hx      SOCIAL  HISTORY:   Social History     Socioeconomic History    Marital status:    Occupational History    Occupation: EPILEPSY MD   Tobacco Use    Smoking status: Never    Smokeless tobacco: Never   Substance and Sexual Activity    Alcohol use: No    Drug use: No    Sexual activity: Yes     Partners: Male     Birth control/protection: None   Social History Narrative    Currently breastfeeding but weaning.        MEDICATIONS:   Current Outpatient Medications:     blood sugar diagnostic Strp, To check BG 4 times daily, to use with insurance preferred meter., Disp: 200 strip, Rfl: 3    blood-glucose meter (CONTOUR LINK MISC), by Misc.(Non-Drug; Combo Route) route., Disp: , Rfl:     blood-glucose sensor (DEXCOM G6 SENSOR) Crystal, 1 Device by Misc.(Non-Drug; Combo Route) route every 10 days., Disp: 9 each, Rfl: 3    blood-glucose transmitter (DEXCOM G6 TRANSMITTER) Crystal, 1 Device by Misc.(Non-Drug; Combo Route) route every 3 (three) months., Disp: 1 each, Rfl: 3    diclofenac sodium (VOLTAREN) 1 % Gel, Apply 2 grams topically 4 (four) times daily., Disp: 100 g, Rfl: 0    diphth,pertus,acell,,tetanus (BOOSTRIX TDAP) 2.5-8-5 Lf-mcg-Lf/0.5mL Syrg injection, Inject into the muscle., Disp: 0.5 mL, Rfl: 0    ergocalciferol (ERGOCALCIFEROL) 50,000 unit Cap, Take 1 capsule (50,000 Units total) by mouth every 7 days. For 8 weeks then take over-the-counter vitamin-D 5000 IU daily, Disp: 8 capsule, Rfl: 0    glucagon (GVOKE HYPOPEN 2-PACK) 0.5 mg/0.1 mL AtIn, Inject 1 each into the skin as needed (hypoglycemia)., Disp: 0.2 mL, Rfl: 3    insulin aspart U-100 (NOVOLOG U-100 INSULIN ASPART) 100 unit/mL injection, For use in insulin pump, max Total Daily Dose 100 units per day, Disp: 100 mL, Rfl: 3    levothyroxine (SYNTHROID) 137 MCG Tab tablet, Take 1 tablet (137 mcg total) by mouth before breakfast., Disp: 90 tablet, Rfl: 3    LIDOcaine (LIDODERM) 5 %, Place 1 patch onto the skin once daily. Remove & Discard patch within 12 hours or as  directed by MD, Disp: 15 patch, Rfl: 0    pneumoc 20-katia conj-dip cr,PF, (PREVNAR 20, PF,) 0.5 mL Syrg injection, Inject into the muscle., Disp: 0.5 mL, Rfl: 0    rosuvastatin (CRESTOR) 5 MG tablet, Take 1 tablet (5 mg total) by mouth once daily., Disp: 90 tablet, Rfl: 3    sertraline (ZOLOFT) 50 MG tablet, Take 1 tablet (50 mg total) by mouth once daily., Disp: 90 tablet, Rfl: 3    insulin (LANTUS SOLOSTAR U-100 INSULIN) glargine 100 units/mL (3mL) SubQ pen, Inject 32 Units into the skin once daily. In case insulin pump fails, Disp: 3 mL, Rfl: 1  ALLERGIES: Review of patient's allergies indicates:  No Known Allergies    Review of Systems   Constitution: Negative. Negative for chills, fever and night sweats.   HENT: Negative for congestion and headaches.    Eyes: Negative for blurred vision, left vision loss and right vision loss.   Cardiovascular: Negative for chest pain and syncope.   Respiratory: Negative for cough and shortness of breath.    Endocrine: Negative for polydipsia, polyphagia and polyuria.   Hematologic/Lymphatic: Negative for bleeding problem. Does not bruise/bleed easily.   Skin: Negative for dry skin, itching and rash.   Musculoskeletal: Negative for falls and muscle weakness.   Gastrointestinal: Negative for abdominal pain and bowel incontinence.   Genitourinary: Negative for bladder incontinence and nocturia.   Neurological: Negative for disturbances in coordination, loss of balance and seizures.   Psychiatric/Behavioral: Negative for depression. The patient does not have insomnia.    Allergic/Immunologic: Negative for hives and persistent infections.     PHYSICAL EXAM:  GEN: A&Ox3, WD WN NAD  HEENT: WNL  CHEST: CTAB, no W/R/R  HEART: RRR, no M/R/G   ABD: Soft, NT ND, BS x4 QUADS  MS: Refer to previous note for detailed MS exam  NEURO: CN II-XII intact       The surgical consent was then reviewed with the patient, who agreed with all the contents of the consent form and it was signed.      PHYSICAL THERAPY:  She was also instructed regarding physical therapy and will begin on POD#1-3. She is doing physical therapy at [facility].    POST OP CARE: Instructions were reviewed including care of the wound and dressing after surgery and when she can shower.     PAIN MANAGEMENT: Thao Ybarra was instructed regarding the Polar ice unit that will be in place after surgery and her postoperative pain medications.     MEDICATION:  Roxicodone 5 mg 1-2 q 4 hours PRN for pain  Zofran 4 mg q 8 hours PRN for nausea and vomiting.  Aspirin 81mg BID x 2 weeks for DVT prophylaxis starting on the evening after surgery.      Post op meds to be delivered bedside prior to discharge. Deliver to family if patient is in surgery at 5pm.     Patient was instructed to purchase and take Colace to counter possible GI side effects of taking opiates.     DVT prophylaxis was discussed with the patient today including risk factors for developing DVTs and history of DVTs. The patient was asked if any specific recommendations were given from the doctor/s that did pre-operative surgical clearance.      If the patient was previously taking 81mg baby aspirin, they were told to not take additional baby aspirin, using the above stated aspirin and to restart the 81mg aspirin daily after completion of the aspirin dose.      Patient was also told to buy over the counter Prilosec medication and take it once daily for GI protection as long as they are taking NSAIDs or Aspirin.     The patient was told that narcotic pain medications may make them drowsy and instructions were given to not sign legal documents, drive or operate heavy machinery, cars, or equipment while under the influence of narcotic medications.     As there were no other questions to be asked, she was given my business card along with Dr. Drummond's business card if she has any questions or concerns prior to surgery or in the postop period.

## 2024-03-26 NOTE — ANESTHESIA PAT ROS NOTE
2024  Thao Ybarra is a 43 y.o., female.      Pre-op Assessment    I have reviewed the Patient Summary Reports.       I have reviewed the Medications.     Review of Systems  Anesthesia Hx:  No problems with previous Anesthesia   History of prior surgery of interest to airway management or planning:  Previous anesthesia: MAC       2022  colonoscopy with MAC.  Procedure performed at an [facility].    Denies Personal Hx of Anesthesia complications.                    Social:  Non-Smoker, No Alcohol Use       Hematology/Oncology:    Oncology Normal    -- Anemia:               Hematology Comments: Vitamin D deficiency, Mild anemia, H&H = 10.3/ 35.2                    EENT/Dental:  EENT/Dental Normal           Cardiovascular:  Exercise tolerance: good       Denies CAD.     Denies Dysrhythmias.   Denies Angina.     hyperlipidemia  Denies WHEAT.                            Pulmonary:  Pulmonary Normal    Denies Asthma.   Denies Shortness of breath.                  Renal/:  Renal/ Normal  Denies Chronic Renal Disease.                Hepatic/GI:  Hepatic/GI Normal     Denies GERD. Denies Liver Disease.            Musculoskeletal:     Acute medial meniscus tear of right knee,  Chondromalacia of right knee            OB/GYN/PEDS:  H/O C-sections X2           Neurological:  Neurology Normal   Denies CVA.    Denies Headaches. Denies Seizures.                                Endocrine:  Diabetes, well controlled, type 1, using insulin Hypothyroidism  Controlled diabetes mellitus type 1 without complications,  Hypothyroidism (acquired),  Insulin pump status,  Using Dexcom CGM and Insulin pump, HA1C = 5.8 on 2023             Past Medical History:   Diagnosis Date    Diabetes mellitus type I     Hypothyroidism      Past Surgical History:   Procedure Laterality Date     SECTION      x2     COLONOSCOPY N/A 9/26/2022    Procedure: COLONOSCOPY;  Surgeon: Conner García MD;  Location: [facility] ([address] [place] [address]);  Service: Endoscopy;  Laterality: N/A;  8/19 fully vaccinated; instructions to portal-st       Anesthesia Assessment: Preoperative EQUATION    Planned Procedure: Procedure(s) (LRB):  ARTHROSCOPY, KNEE, WITH MENISCECTOMY (Right)  Requested Anesthesia Type:General  Surgeon: RUPESH Drummond MD  Service: Orthopedics  Known or anticipated Date of Surgery:3/28/2024    Surgeon notes: reviewed    Electronic QUestionnaire Assessment completed via nurse interview with patient.        Triage considerations:     The patient has no apparent active cardiac condition (No unstable coronary Syndrome such as severe unstable angina or recent [<1 month] myocardial infarction, decompensated CHF, severe valvular   disease or significant arrhythmia)    Previous anesthesia records:MAC and No problems    Last PCP note: 6-12 months ago , within [facility]   Subspecialty notes: Endocrinology    Other important co-morbidities: HLD, Hypothyroid and DM Type 1       Tests already available:  Results have been reviewed.             Instructions given. (See in Nurse's note)      Optimization:  Anesthesia Preop Clinic Assessment Not Indicated    Medical Opinion Indicated: No       Sub-specialist consult indicated: N/A      Plan:    Consultation: medical clearance is not requested.      Navigation: No tests Scheduled.              Consults scheduled: N/A             No tests, anesthesia preop clinic visit, or consult required.                        Patient is OK to proceed with surgery at [facility].       Ht: 6'  Wt: 84 kg (185 lb)  BMI: 25.12  Vaccinated

## 2024-03-26 NOTE — H&P (VIEW-ONLY)
Thao Ybarra  is here for a completion of her perioperative paperwork. she  Is scheduled to undergo right knee arthroscopic partial medial meniscectomy, chondroplasty  on 3/28/2024.  She is a healthy individual and does not need clearance for this procedure.     Risks, indications and benefits of the surgical procedure were discussed with the patient. All questions with regard to surgery, rehab, expected return to functional activities, activities of daily living and recreational endeavors were answered to her satisfaction.    Discussed COVID-19 with the patient, they are aware of our current policies and procedures, were given the option of delaying surgery, and they elect to proceed.    Patient was informed and understands the risks of surgery are greater for patients with a current condition or hx of heart disease, obesity, clotting disorders, recurrent infections, steroid use, current or past smoking, and factors such as sedentary lifestyle and noncompliance with medications, therapy or f/u. The degree of the increased risk is hard to estimate w/ any degree of precision.    Once no other questions were asked, a brief history and physical exam was then performed.    PAST MEDICAL HISTORY:   Past Medical History:   Diagnosis Date    Diabetes mellitus type I     Hypothyroidism      PAST SURGICAL HISTORY:   Past Surgical History:   Procedure Laterality Date     SECTION      x2    COLONOSCOPY N/A 2022    Procedure: COLONOSCOPY;  Surgeon: Conner García MD;  Location: [facility] ([address] [place] [address]);  Service: Endoscopy;  Laterality: N/A;   fully vaccinated; instructions to portal-st     FAMILY HISTORY:   Family History   Problem Relation Age of Onset    No Known Problems Mother     Cancer Father 79        colon CA    No Known Problems Brother     No Known Problems Daughter     No Known Problems Son     Diabetes Neg Hx     Ovarian cancer Neg Hx     Colon cancer Neg Hx     Breast cancer Neg Hx      SOCIAL  HISTORY:   Social History     Socioeconomic History    Marital status:    Occupational History    Occupation: EPILEPSY MD   Tobacco Use    Smoking status: Never    Smokeless tobacco: Never   Substance and Sexual Activity    Alcohol use: No    Drug use: No    Sexual activity: Yes     Partners: Male     Birth control/protection: None   Social History Narrative    Currently breastfeeding but weaning.        MEDICATIONS:   Current Outpatient Medications:     blood sugar diagnostic Strp, To check BG 4 times daily, to use with insurance preferred meter., Disp: 200 strip, Rfl: 3    blood-glucose meter (CONTOUR LINK MISC), by Misc.(Non-Drug; Combo Route) route., Disp: , Rfl:     blood-glucose sensor (DEXCOM G6 SENSOR) Crystal, 1 Device by Misc.(Non-Drug; Combo Route) route every 10 days., Disp: 9 each, Rfl: 3    blood-glucose transmitter (DEXCOM G6 TRANSMITTER) Crystal, 1 Device by Misc.(Non-Drug; Combo Route) route every 3 (three) months., Disp: 1 each, Rfl: 3    diclofenac sodium (VOLTAREN) 1 % Gel, Apply 2 grams topically 4 (four) times daily., Disp: 100 g, Rfl: 0    diphth,pertus,acell,,tetanus (BOOSTRIX TDAP) 2.5-8-5 Lf-mcg-Lf/0.5mL Syrg injection, Inject into the muscle., Disp: 0.5 mL, Rfl: 0    ergocalciferol (ERGOCALCIFEROL) 50,000 unit Cap, Take 1 capsule (50,000 Units total) by mouth every 7 days. For 8 weeks then take over-the-counter vitamin-D 5000 IU daily, Disp: 8 capsule, Rfl: 0    glucagon (GVOKE HYPOPEN 2-PACK) 0.5 mg/0.1 mL AtIn, Inject 1 each into the skin as needed (hypoglycemia)., Disp: 0.2 mL, Rfl: 3    insulin aspart U-100 (NOVOLOG U-100 INSULIN ASPART) 100 unit/mL injection, For use in insulin pump, max Total Daily Dose 100 units per day, Disp: 100 mL, Rfl: 3    levothyroxine (SYNTHROID) 137 MCG Tab tablet, Take 1 tablet (137 mcg total) by mouth before breakfast., Disp: 90 tablet, Rfl: 3    LIDOcaine (LIDODERM) 5 %, Place 1 patch onto the skin once daily. Remove & Discard patch within 12 hours or as  directed by MD, Disp: 15 patch, Rfl: 0    pneumoc 20-katia conj-dip cr,PF, (PREVNAR 20, PF,) 0.5 mL Syrg injection, Inject into the muscle., Disp: 0.5 mL, Rfl: 0    rosuvastatin (CRESTOR) 5 MG tablet, Take 1 tablet (5 mg total) by mouth once daily., Disp: 90 tablet, Rfl: 3    sertraline (ZOLOFT) 50 MG tablet, Take 1 tablet (50 mg total) by mouth once daily., Disp: 90 tablet, Rfl: 3    insulin (LANTUS SOLOSTAR U-100 INSULIN) glargine 100 units/mL (3mL) SubQ pen, Inject 32 Units into the skin once daily. In case insulin pump fails, Disp: 3 mL, Rfl: 1  ALLERGIES: Review of patient's allergies indicates:  No Known Allergies    Review of Systems   Constitution: Negative. Negative for chills, fever and night sweats.   HENT: Negative for congestion and headaches.    Eyes: Negative for blurred vision, left vision loss and right vision loss.   Cardiovascular: Negative for chest pain and syncope.   Respiratory: Negative for cough and shortness of breath.    Endocrine: Negative for polydipsia, polyphagia and polyuria.   Hematologic/Lymphatic: Negative for bleeding problem. Does not bruise/bleed easily.   Skin: Negative for dry skin, itching and rash.   Musculoskeletal: Negative for falls and muscle weakness.   Gastrointestinal: Negative for abdominal pain and bowel incontinence.   Genitourinary: Negative for bladder incontinence and nocturia.   Neurological: Negative for disturbances in coordination, loss of balance and seizures.   Psychiatric/Behavioral: Negative for depression. The patient does not have insomnia.    Allergic/Immunologic: Negative for hives and persistent infections.     PHYSICAL EXAM:  GEN: A&Ox3, WD WN NAD  HEENT: WNL  CHEST: CTAB, no W/R/R  HEART: RRR, no M/R/G   ABD: Soft, NT ND, BS x4 QUADS  MS: Refer to previous note for detailed MS exam  NEURO: CN II-XII intact       The surgical consent was then reviewed with the patient, who agreed with all the contents of the consent form and it was signed.      PHYSICAL THERAPY:  She was also instructed regarding physical therapy and will begin on POD#1-3. She is doing physical therapy at [facility].    POST OP CARE: Instructions were reviewed including care of the wound and dressing after surgery and when she can shower.     PAIN MANAGEMENT: Thao Ybarra was instructed regarding the Polar ice unit that will be in place after surgery and her postoperative pain medications.     MEDICATION:  Roxicodone 5 mg 1-2 q 4 hours PRN for pain  Zofran 4 mg q 8 hours PRN for nausea and vomiting.  Aspirin 81mg BID x 2 weeks for DVT prophylaxis starting on the evening after surgery.      Post op meds to be delivered bedside prior to discharge. Deliver to family if patient is in surgery at 5pm.     Patient was instructed to purchase and take Colace to counter possible GI side effects of taking opiates.     DVT prophylaxis was discussed with the patient today including risk factors for developing DVTs and history of DVTs. The patient was asked if any specific recommendations were given from the doctor/s that did pre-operative surgical clearance.      If the patient was previously taking 81mg baby aspirin, they were told to not take additional baby aspirin, using the above stated aspirin and to restart the 81mg aspirin daily after completion of the aspirin dose.      Patient was also told to buy over the counter Prilosec medication and take it once daily for GI protection as long as they are taking NSAIDs or Aspirin.     The patient was told that narcotic pain medications may make them drowsy and instructions were given to not sign legal documents, drive or operate heavy machinery, cars, or equipment while under the influence of narcotic medications.     As there were no other questions to be asked, she was given my business card along with Dr. Drummond's business card if she has any questions or concerns prior to surgery or in the postop period.

## 2024-03-27 ENCOUNTER — TELEPHONE (OUTPATIENT)
Dept: SPORTS MEDICINE | Facility: CLINIC | Age: 44
End: 2024-03-27
Payer: COMMERCIAL

## 2024-03-27 ENCOUNTER — ANESTHESIA EVENT (OUTPATIENT)
Dept: SURGERY | Facility: HOSPITAL | Age: 44
End: 2024-03-27
Payer: COMMERCIAL

## 2024-03-27 NOTE — TELEPHONE ENCOUNTER
----- Message from Edel Baugh sent at 3/27/2024  1:19 PM CDT -----    ----- Message -----  From: Izabella Eduardo  Sent: 3/27/2024   1:13 PM CDT  To: [facility] Pitts Staff    BEVERLY ALVARADO calling regarding Patient Advice  for a missed call from the nurse about the surgery tomorrow, but no time was added to the message, please contact to inform PT, [phone]

## 2024-03-28 ENCOUNTER — ANESTHESIA (OUTPATIENT)
Dept: SURGERY | Facility: HOSPITAL | Age: 44
End: 2024-03-28
Payer: COMMERCIAL

## 2024-03-28 ENCOUNTER — HOSPITAL ENCOUNTER (OUTPATIENT)
Facility: HOSPITAL | Age: 44
Discharge: HOME | End: 2024-03-28
Attending: ORTHOPAEDIC SURGERY | Admitting: ORTHOPAEDIC SURGERY
Payer: COMMERCIAL

## 2024-03-28 VITALS
DIASTOLIC BLOOD PRESSURE: 57 MMHG | RESPIRATION RATE: 16 BRPM | HEIGHT: 72 IN | BODY MASS INDEX: 24.38 KG/M2 | WEIGHT: 180 LBS | HEART RATE: 50 BPM | SYSTOLIC BLOOD PRESSURE: 120 MMHG | OXYGEN SATURATION: 100 % | TEMPERATURE: 99 F

## 2024-03-28 DIAGNOSIS — S83.231A COMPLEX TEAR OF MEDIAL MENISCUS OF RIGHT KNEE: Primary | ICD-10-CM

## 2024-03-28 DIAGNOSIS — S83.231A COMPLEX TEAR OF MEDIAL MENISCUS OF RIGHT KNEE AS CURRENT INJURY, INITIAL ENCOUNTER: ICD-10-CM

## 2024-03-28 LAB
B-HCG UR QL: NEGATIVE
CTP QC/QA: YES
POCT GLUCOSE: 117 MG/DL (ref 70–110)

## 2024-03-28 PROCEDURE — 25000003 PHARM REV CODE 250: Performed by: PHYSICIAN ASSISTANT

## 2024-03-28 PROCEDURE — 71000039 HC RECOVERY, EACH ADD'L HOUR: Performed by: ORTHOPAEDIC SURGERY

## 2024-03-28 PROCEDURE — 25000003 PHARM REV CODE 250: Performed by: SURGERY

## 2024-03-28 PROCEDURE — D9220A PRA ANESTHESIA: Mod: CRNA,,, | Performed by: NURSE ANESTHETIST, CERTIFIED REGISTERED

## 2024-03-28 PROCEDURE — 29881 ARTHRS KNE SRG MNISECTMY M/L: CPT | Mod: RT,,, | Performed by: ORTHOPAEDIC SURGERY

## 2024-03-28 PROCEDURE — 94761 N-INVAS EAR/PLS OXIMETRY MLT: CPT

## 2024-03-28 PROCEDURE — 37000009 HC ANESTHESIA EA ADD 15 MINS: Performed by: ORTHOPAEDIC SURGERY

## 2024-03-28 PROCEDURE — 36000710: Performed by: ORTHOPAEDIC SURGERY

## 2024-03-28 PROCEDURE — 71000015 HC POSTOP RECOV 1ST HR: Performed by: ORTHOPAEDIC SURGERY

## 2024-03-28 PROCEDURE — 99900035 HC TECH TIME PER 15 MIN (STAT)

## 2024-03-28 PROCEDURE — 25000003 PHARM REV CODE 250: Performed by: NURSE ANESTHETIST, CERTIFIED REGISTERED

## 2024-03-28 PROCEDURE — 37000008 HC ANESTHESIA 1ST 15 MINUTES: Performed by: ORTHOPAEDIC SURGERY

## 2024-03-28 PROCEDURE — 27201423 OPTIME MED/SURG SUP & DEVICES STERILE SUPPLY: Performed by: ORTHOPAEDIC SURGERY

## 2024-03-28 PROCEDURE — 63600175 PHARM REV CODE 636 W HCPCS: Performed by: ORTHOPAEDIC SURGERY

## 2024-03-28 PROCEDURE — D9220A PRA ANESTHESIA: Mod: ANES,,, | Performed by: SURGERY

## 2024-03-28 PROCEDURE — 81025 URINE PREGNANCY TEST: CPT | Performed by: PHYSICIAN ASSISTANT

## 2024-03-28 PROCEDURE — 63600175 PHARM REV CODE 636 W HCPCS: Performed by: NURSE ANESTHETIST, CERTIFIED REGISTERED

## 2024-03-28 PROCEDURE — 71000033 HC RECOVERY, INTIAL HOUR: Performed by: ORTHOPAEDIC SURGERY

## 2024-03-28 PROCEDURE — 36000711: Performed by: ORTHOPAEDIC SURGERY

## 2024-03-28 PROCEDURE — 63600175 PHARM REV CODE 636 W HCPCS: Performed by: SURGERY

## 2024-03-28 RX ORDER — PROPOFOL 10 MG/ML
VIAL (ML) INTRAVENOUS CONTINUOUS PRN
Status: DISCONTINUED | OUTPATIENT
Start: 2024-03-28 | End: 2024-03-28

## 2024-03-28 RX ORDER — ONDANSETRON HYDROCHLORIDE 2 MG/ML
INJECTION, SOLUTION INTRAVENOUS
Status: DISCONTINUED | OUTPATIENT
Start: 2024-03-28 | End: 2024-03-28

## 2024-03-28 RX ORDER — METHOCARBAMOL 500 MG/1
1000 TABLET, FILM COATED ORAL ONCE
Status: COMPLETED | OUTPATIENT
Start: 2024-03-28 | End: 2024-03-28

## 2024-03-28 RX ORDER — FAMOTIDINE 10 MG/ML
INJECTION INTRAVENOUS
Status: DISCONTINUED | OUTPATIENT
Start: 2024-03-28 | End: 2024-03-28

## 2024-03-28 RX ORDER — OXYCODONE HYDROCHLORIDE 5 MG/1
5 TABLET ORAL
Status: DISCONTINUED | OUTPATIENT
Start: 2024-03-28 | End: 2024-03-28 | Stop reason: HOSPADM

## 2024-03-28 RX ORDER — MIDAZOLAM HYDROCHLORIDE 1 MG/ML
1 INJECTION, SOLUTION INTRAMUSCULAR; INTRAVENOUS
Status: DISCONTINUED | OUTPATIENT
Start: 2024-03-28 | End: 2024-03-28 | Stop reason: HOSPADM

## 2024-03-28 RX ORDER — SODIUM CHLORIDE 9 MG/ML
INJECTION, SOLUTION INTRAVENOUS CONTINUOUS
Status: DISCONTINUED | OUTPATIENT
Start: 2024-03-28 | End: 2024-03-28 | Stop reason: HOSPADM

## 2024-03-28 RX ORDER — SODIUM CHLORIDE 0.9 % (FLUSH) 0.9 %
10 SYRINGE (ML) INJECTION
Status: DISCONTINUED | OUTPATIENT
Start: 2024-03-28 | End: 2024-03-28 | Stop reason: HOSPADM

## 2024-03-28 RX ORDER — EPINEPHRINE 1 MG/ML
INJECTION, SOLUTION, CONCENTRATE INTRAVENOUS
Status: DISCONTINUED | OUTPATIENT
Start: 2024-03-28 | End: 2024-03-28 | Stop reason: HOSPADM

## 2024-03-28 RX ORDER — FENTANYL CITRATE 50 UG/ML
100 INJECTION, SOLUTION INTRAMUSCULAR; INTRAVENOUS
Status: DISCONTINUED | OUTPATIENT
Start: 2024-03-28 | End: 2024-03-28 | Stop reason: HOSPADM

## 2024-03-28 RX ORDER — CEFAZOLIN SODIUM 1 G/3ML
INJECTION, POWDER, FOR SOLUTION INTRAMUSCULAR; INTRAVENOUS
Status: DISCONTINUED | OUTPATIENT
Start: 2024-03-28 | End: 2024-03-28

## 2024-03-28 RX ORDER — TRANEXAMIC ACID 100 MG/ML
INJECTION, SOLUTION INTRAVENOUS
Status: DISCONTINUED | OUTPATIENT
Start: 2024-03-28 | End: 2024-03-28

## 2024-03-28 RX ORDER — MIDAZOLAM HYDROCHLORIDE 1 MG/ML
INJECTION INTRAMUSCULAR; INTRAVENOUS
Status: DISCONTINUED | OUTPATIENT
Start: 2024-03-28 | End: 2024-03-28

## 2024-03-28 RX ORDER — DEXAMETHASONE SODIUM PHOSPHATE 4 MG/ML
INJECTION, SOLUTION INTRA-ARTICULAR; INTRALESIONAL; INTRAMUSCULAR; INTRAVENOUS; SOFT TISSUE
Status: DISCONTINUED | OUTPATIENT
Start: 2024-03-28 | End: 2024-03-28

## 2024-03-28 RX ORDER — DEXMEDETOMIDINE HYDROCHLORIDE 100 UG/ML
INJECTION, SOLUTION INTRAVENOUS
Status: DISCONTINUED | OUTPATIENT
Start: 2024-03-28 | End: 2024-03-28

## 2024-03-28 RX ORDER — HALOPERIDOL 5 MG/ML
0.5 INJECTION INTRAMUSCULAR EVERY 10 MIN PRN
Status: DISCONTINUED | OUTPATIENT
Start: 2024-03-28 | End: 2024-03-28 | Stop reason: HOSPADM

## 2024-03-28 RX ORDER — ACETAMINOPHEN 500 MG
1000 TABLET ORAL
Status: COMPLETED | OUTPATIENT
Start: 2024-03-28 | End: 2024-03-28

## 2024-03-28 RX ORDER — LIDOCAINE HYDROCHLORIDE 10 MG/ML
INJECTION, SOLUTION INTRAVENOUS
Status: DISCONTINUED | OUTPATIENT
Start: 2024-03-28 | End: 2024-03-28

## 2024-03-28 RX ORDER — KETAMINE HCL IN 0.9 % NACL 50 MG/5 ML
SYRINGE (ML) INTRAVENOUS
Status: DISCONTINUED | OUTPATIENT
Start: 2024-03-28 | End: 2024-03-28

## 2024-03-28 RX ORDER — PROPOFOL 10 MG/ML
VIAL (ML) INTRAVENOUS
Status: DISCONTINUED | OUTPATIENT
Start: 2024-03-28 | End: 2024-03-28

## 2024-03-28 RX ORDER — CELECOXIB 200 MG/1
400 CAPSULE ORAL
Status: COMPLETED | OUTPATIENT
Start: 2024-03-28 | End: 2024-03-28

## 2024-03-28 RX ORDER — FENTANYL CITRATE 50 UG/ML
INJECTION, SOLUTION INTRAMUSCULAR; INTRAVENOUS
Status: DISCONTINUED | OUTPATIENT
Start: 2024-03-28 | End: 2024-03-28

## 2024-03-28 RX ORDER — HYDROMORPHONE HYDROCHLORIDE 1 MG/ML
0.2 INJECTION, SOLUTION INTRAMUSCULAR; INTRAVENOUS; SUBCUTANEOUS EVERY 5 MIN PRN
Status: DISCONTINUED | OUTPATIENT
Start: 2024-03-28 | End: 2024-03-28 | Stop reason: HOSPADM

## 2024-03-28 RX ADMIN — CEFAZOLIN 2 G: 330 INJECTION, POWDER, FOR SOLUTION INTRAMUSCULAR; INTRAVENOUS at 12:03

## 2024-03-28 RX ADMIN — SODIUM CHLORIDE: 9 INJECTION, SOLUTION INTRAVENOUS at 11:03

## 2024-03-28 RX ADMIN — LIDOCAINE HYDROCHLORIDE 100 MG: 10 INJECTION, SOLUTION INTRAVENOUS at 12:03

## 2024-03-28 RX ADMIN — HYDROMORPHONE HYDROCHLORIDE 0.2 MG: 1 INJECTION, SOLUTION INTRAMUSCULAR; INTRAVENOUS; SUBCUTANEOUS at 01:03

## 2024-03-28 RX ADMIN — FAMOTIDINE 20 MG: 10 INJECTION, SOLUTION INTRAVENOUS at 12:03

## 2024-03-28 RX ADMIN — METHOCARBAMOL 1000 MG: 500 TABLET ORAL at 01:03

## 2024-03-28 RX ADMIN — CELECOXIB 400 MG: 200 CAPSULE ORAL at 09:03

## 2024-03-28 RX ADMIN — DEXMEDETOMIDINE 12 MCG: 100 INJECTION, SOLUTION, CONCENTRATE INTRAVENOUS at 12:03

## 2024-03-28 RX ADMIN — TRANEXAMIC ACID 1000 MG: 100 INJECTION INTRAVENOUS at 12:03

## 2024-03-28 RX ADMIN — PROPOFOL 50 MG: 10 INJECTION, EMULSION INTRAVENOUS at 12:03

## 2024-03-28 RX ADMIN — FENTANYL CITRATE 100 MCG: 50 INJECTION, SOLUTION INTRAMUSCULAR; INTRAVENOUS at 12:03

## 2024-03-28 RX ADMIN — ACETAMINOPHEN 1000 MG: 500 TABLET ORAL at 09:03

## 2024-03-28 RX ADMIN — MIDAZOLAM HYDROCHLORIDE 2 MG: 1 INJECTION, SOLUTION INTRAMUSCULAR; INTRAVENOUS at 11:03

## 2024-03-28 RX ADMIN — Medication 30 MG: at 12:03

## 2024-03-28 RX ADMIN — ONDANSETRON 4 MG: 2 INJECTION INTRAMUSCULAR; INTRAVENOUS at 12:03

## 2024-03-28 RX ADMIN — OXYCODONE 5 MG: 5 TABLET ORAL at 01:03

## 2024-03-28 RX ADMIN — PROPOFOL 150 MCG/KG/MIN: 10 INJECTION, EMULSION INTRAVENOUS at 12:03

## 2024-03-28 RX ADMIN — DEXAMETHASONE SODIUM PHOSPHATE 4 MG: 4 INJECTION, SOLUTION INTRAMUSCULAR; INTRAVENOUS at 12:03

## 2024-03-28 RX ADMIN — PROPOFOL 20 MG: 10 INJECTION, EMULSION INTRAVENOUS at 12:03

## 2024-03-28 RX ADMIN — SODIUM CHLORIDE: 9 INJECTION, SOLUTION INTRAVENOUS at 09:03

## 2024-03-28 NOTE — ANESTHESIA PREPROCEDURE EVALUATION
Pre-operative evaluation for Procedure(s) (LRB):  ARTHROSCOPY, KNEE, WITH MENISCECTOMY (Right)    Thao Ybarra is a 43 y.o. female     Patient Active Problem List   Diagnosis    Controlled diabetes mellitus type 1 without complications    Hypothyroidism (acquired)    Insulin pump status    Vitamin D deficiency    Mixed hyperlipidemia       Review of patient's allergies indicates:  No Known Allergies    No current facility-administered medications on file prior to encounter.     Current Outpatient Medications on File Prior to Encounter   Medication Sig Dispense Refill    blood sugar diagnostic Strp To check BG 4 times daily, to use with insurance preferred meter. 200 strip 3    blood-glucose meter (CONTOUR LINK MISC) by Misc.(Non-Drug; Combo Route) route.      blood-glucose sensor (DEXCOM G6 SENSOR) Crystal 1 Device by Misc.(Non-Drug; Combo Route) route every 10 days. 9 each 3    blood-glucose transmitter (DEXCOM G6 TRANSMITTER) Crystal 1 Device by Misc.(Non-Drug; Combo Route) route every 3 (three) months. 1 each 3    diclofenac sodium (VOLTAREN) 1 % Gel Apply 2 grams topically 4 (four) times daily. 100 g 0    ergocalciferol (ERGOCALCIFEROL) 50,000 unit Cap Take 1 capsule (50,000 Units total) by mouth every 7 days. For 8 weeks then take over-the-counter vitamin-D 5000 IU daily 8 capsule 0    insulin aspart U-100 (NOVOLOG U-100 INSULIN ASPART) 100 unit/mL injection For use in insulin pump, max Total Daily Dose 100 units per day 100 mL 3    levothyroxine (SYNTHROID) 137 MCG Tab tablet Take 1 tablet (137 mcg total) by mouth before breakfast. 90 tablet 3    LIDOcaine (LIDODERM) 5 % Place 1 patch onto the skin once daily. Remove & Discard patch within 12 hours or as directed by MD 15 patch 0    rosuvastatin (CRESTOR) 5 MG tablet Take 1 tablet (5 mg total) by mouth once daily. 90 tablet 3    sertraline (ZOLOFT) 50 MG tablet Take 1 tablet (50 mg total) by mouth once daily. 90 tablet 3    aspirin (ECOTRIN) 81 MG EC tablet Take 1  "tablet (81 mg total) by mouth 2 (two) times a day. for 14 days 28 tablet 0    diphth,pertus,acell,,tetanus (BOOSTRIX TDAP) 2.5-8-5 Lf-mcg-Lf/0.5mL Syrg injection Inject into the muscle. 0.5 mL 0    glucagon (GVOKE HYPOPEN 2-PACK) 0.5 mg/0.1 mL AtIn Inject 1 each into the skin as needed (hypoglycemia). 0.2 mL 3    insulin (LANTUS SOLOSTAR U-100 INSULIN) glargine 100 units/mL (3mL) SubQ pen Inject 32 Units into the skin once daily. In case insulin pump fails 3 mL 1    ondansetron (ZOFRAN-ODT) 4 MG TbDL Dissolve 1 tablet (4 mg total) by mouth every 8 (eight) hours as needed (nausea). 30 tablet 0    oxyCODONE (ROXICODONE) 5 MG immediate release tablet Take 1-2 tablets (5-10 mg total) by mouth every 4 to 6 hours as needed for Pain. 28 tablet 0    pneumoc 20-katia conj-dip cr,PF, (PREVNAR 20, PF,) 0.5 mL Syrg injection Inject into the muscle. 0.5 mL 0       Past Surgical History:   Procedure Laterality Date     SECTION      x2    COLONOSCOPY N/A 2022    Procedure: COLONOSCOPY;  Surgeon: Conner García MD;  Location: [address]);  Service: Endoscopy;  Laterality: N/A;   fully vaccinated; instructions to portal-st         CBC:  No results found for: "WBC", "RBC", "HGB", "HCT", "PLT", "MCV", "MCH", "MCHC"    CMP:   No results found for: "NA", "K", "CL", "CO2", "BUN", "CREATININE", "GLU", "MG", "PHOS", "CALCIUM", "ALBUMIN", "PROT", "ALKPHOS", "ALT", "AST", "BILITOT"    INR:  No results found for: "PT", "INR", "PROTIME", "APTT"      Diagnostic Studies:      EKG:   No results found for this or any previous visit.     2D Echo:  No results found for this or any previous visit.    Stress Test:   No results found for this or any previous visit.      Pre-op Vitals [24]   BP Pulse Resp Temp SpO2   124/70 67 18 36.9 °C (98.4 °F) 100 %      Height Weight BMI (Calculated)     6' 180 lb 24.4         Pre-op Vitals [24]   BP Pulse Resp Temp SpO2   124/70 67 18 36.9 °C (98.4 °F) 100 %    "   Height Weight BMI (Calculated)     6' 180 lb 24.4            Pre-op Assessment    I have reviewed the Patient Summary Reports.       I have reviewed the Medications.     Review of Systems  Anesthesia Hx:  No problems with previous Anesthesia   History of prior surgery of interest to airway management or planning:  Previous anesthesia: MAC       9/26/2022  colonoscopy with MAC.  Procedure performed at an [facility].    Denies Personal Hx of Anesthesia complications.                    Social:  Non-Smoker, No Alcohol Use       Hematology/Oncology:    Oncology Normal    -- Anemia:               Hematology Comments: Vitamin D deficiency, Mild anemia, H&H = 10.3/ 35.2                    EENT/Dental:  EENT/Dental Normal           Cardiovascular:  Exercise tolerance: good       Denies CAD.     Denies Dysrhythmias.   Denies Angina.     hyperlipidemia  Denies WHEAT.                            Pulmonary:  Pulmonary Normal    Denies Asthma.   Denies Shortness of breath.                  Renal/:  Renal/ Normal  Denies Chronic Renal Disease.                Hepatic/GI:  Hepatic/GI Normal     Denies GERD. Denies Liver Disease.            Musculoskeletal:     Acute medial meniscus tear of right knee,  Chondromalacia of right knee            OB/GYN/PEDS:  H/O C-sections X2           Neurological:  Neurology Normal   Denies CVA.    Denies Headaches. Denies Seizures.                                Endocrine:  Diabetes, well controlled, type 1, using insulin Hypothyroidism  Controlled diabetes mellitus type 1 without complications,  Hypothyroidism (acquired),  Insulin pump status,  Using Dexcom CGM and Insulin pump, HA1C = 5.8 on 7/6/2023            Physical Exam  General: Well nourished, Cooperative, Alert and Oriented    Airway:  Mallampati: II   Mouth Opening: Normal  TM Distance: Normal  Tongue: Normal  Neck ROM: Normal ROM    Dental:  Intact        Anesthesia Plan  Type of Anesthesia, risks & benefits  discussed:    Anesthesia Type: Gen ETT, Gen Supraglottic Airway  Intra-op Monitoring Plan: Standard ASA Monitors  Post Op Pain Control Plan: multimodal analgesia, IV/PO Opioids PRN and peripheral nerve block  Induction:  IV  Airway Plan: Video  Informed Consent: Informed consent signed with the Patient and all parties understand the risks and agree with anesthesia plan.  All questions answered. Patient consented to blood products? No  ASA Score: 2  Day of Surgery Review of History & Physical: H&P Update referred to the surgeon/provider.    Ready For Surgery From Anesthesia Perspective.     .

## 2024-03-28 NOTE — ANESTHESIA PROCEDURE NOTES
Intubation    Date/Time: 3/28/2024 12:06 PM    Performed by: Wanda Taylor CRNA  Authorized by: Danny Larkin MD    Intubation:     Induction:  Intravenous    Intubated:  Postinduction    Mask Ventilation:  Easy mask    Attempts:  1    Attempted By:  CRNA    Method of Intubation:  Fast track LMA    Difficult Airway Encountered?: No      Complications:  None    Airway Device:  Supraglottic airway/LMA    Airway Device Size:  4.0    Style/Cuff Inflation:  Cuffed    Inflation Amount (mL):  5    Secured at:  The lips    Placement Verified By:  Capnometry    Complicating Factors:  None    Findings Post-Intubation:  BS equal bilateral

## 2024-03-28 NOTE — PLAN OF CARE
Pre op complete. Patient resting comfortably. Call light in reach.  not in facility but to come to bedside prior to surgery, belongings in locker. Waiting on anesthesia consent. Patient blood sugar of 117. Patient has crutches for discharge.

## 2024-03-28 NOTE — BRIEF OP NOTE
[place] - Surgery ([facility])  Brief Operative Note    Surgery Date: 3/28/2024     Surgeon(s) and Role:     * RUPESH Drummond MD - Primary    Assisting Surgeon: None    Pre-op Diagnosis:  Acute medial meniscus tear of right knee, initial encounter [S83.241A]    Post-op Diagnosis:  Post-Op Diagnosis Codes:     * Acute medial meniscus tear of right knee, initial encounter [S83.241A]    Procedure(s) (LRB):  ARTHROSCOPY, KNEE, WITH MEDIAL MENISCECTOMY (Right)  SYNOVECTOMY, KNEE (Right)  ARTHROSCOPY, KNEE, WITH CHONDROPLASTY (Right)    Anesthesia: General    Operative Findings: Medial meniscus tear right knee    Estimated Blood Loss: <50ccs         Specimens:   Specimen (24h ago, onward)      None              Discharge Note    OUTCOME: Patient tolerated treatment/procedure well without complication and is now ready for discharge.    DISPOSITION: Home or Self Care    FINAL DIAGNOSIS:  <principal problem not specified>    FOLLOWUP: In clinic    DISCHARGE INSTRUCTIONS:  No discharge procedures on file.

## 2024-03-28 NOTE — ANESTHESIA POSTPROCEDURE EVALUATION
Anesthesia Post Evaluation    Patient: Thao Ybarra    Procedure(s) Performed: Procedure(s) (LRB):  ARTHROSCOPY, KNEE, WITH MEDIAL MENISCECTOMY (Right)  SYNOVECTOMY, KNEE (Right)  ARTHROSCOPY, KNEE, WITH CHONDROPLASTY (Right)    Final Anesthesia Type: general      Patient location during evaluation: PACU  Patient participation: Yes- Able to Participate  Level of consciousness: awake and alert and oriented  Post-procedure vital signs: reviewed and stable  Pain management: adequate  Airway patency: patent  LUKE mitigation strategies: Multimodal analgesia  PONV status at discharge: No PONV  Anesthetic complications: no      Cardiovascular status: blood pressure returned to baseline and hemodynamically stable  Respiratory status: unassisted, spontaneous ventilation and room air  Hydration status: euvolemic  Follow-up not needed.              Vitals Value Taken Time   /57 03/28/24 1416   Temp 37.2 °C (99 °F) 03/28/24 1415   Pulse 64 03/28/24 1424   Resp 48 03/28/24 1424   SpO2 99 % 03/28/24 1423   Vitals shown include unvalidated device data.      Event Time   Out of Recovery 14:07:42         Pain/Ashley Score: Pain Rating Prior to Med Admin: 8 (3/28/2024  1:33 PM)  Ashley Score: 10 (3/28/2024  2:15 PM)

## 2024-03-28 NOTE — TRANSFER OF CARE
Anesthesia Transfer of Care Note    Patient: Thao Ybarra    Procedure(s) Performed: Procedure(s) (LRB):  ARTHROSCOPY, KNEE, WITH MEDIAL MENISCECTOMY (Right)  SYNOVECTOMY, KNEE (Right)  ARTHROSCOPY, KNEE, WITH CHONDROPLASTY (Right)    Patient location: PACU    Anesthesia Type: general    Transport from OR: Transported from OR on 6-10 L/min O2 by face mask with adequate spontaneous ventilation    Post pain: adequate analgesia    Post assessment: no apparent anesthetic complications and tolerated procedure well    Post vital signs: stable    Level of consciousness: sedated    Nausea/Vomiting: no nausea/vomiting    Complications: none    Transfer of care protocol was followed      Last vitals: Visit Vitals  BP (!) 160/76 (BP Location: Left arm, Patient Position: Lying)   Pulse (!) 46   Temp 36.9 °C (98.4 °F) (Temporal)   Resp 14   Ht 6' (1.829 m)   Wt 81.6 kg (180 lb)   LMP 03/14/2024 (Approximate)   SpO2 100%   Breastfeeding No   BMI 24.41 kg/m²

## 2024-03-28 NOTE — PLAN OF CARE
Discharge instructions reviewed and pt verbalizes understating. Pain control appropriate. VSS and AVS complete. Dressing in place with no issue. Ambulating appropriately. Crutches at home. Meds at bedside for discharge.

## 2024-04-01 ENCOUNTER — CLINICAL SUPPORT (OUTPATIENT)
Dept: REHABILITATION | Facility: HOSPITAL | Age: 44
End: 2024-04-01
Payer: COMMERCIAL

## 2024-04-01 DIAGNOSIS — M25.661 DECREASED RANGE OF MOTION OF RIGHT KNEE: ICD-10-CM

## 2024-04-01 DIAGNOSIS — M23.91 ACUTE INTERNAL DERANGEMENT OF RIGHT KNEE: ICD-10-CM

## 2024-04-01 DIAGNOSIS — R26.9 GAIT ABNORMALITY: Primary | ICD-10-CM

## 2024-04-01 DIAGNOSIS — R29.898 WEAKNESS OF RIGHT LOWER EXTREMITY: ICD-10-CM

## 2024-04-01 PROCEDURE — 97140 MANUAL THERAPY 1/> REGIONS: CPT

## 2024-04-01 PROCEDURE — 97110 THERAPEUTIC EXERCISES: CPT

## 2024-04-01 PROCEDURE — 97161 PT EVAL LOW COMPLEX 20 MIN: CPT

## 2024-04-01 NOTE — OP NOTE
[facility]   OPERATIVE REPORT   ORTHOPAEDIC SURGERY   PROVIDER: DR. ROBERT COLVIN    PATIENT INFORMATION   Thao Ybarra 43 y.o. female 1980   MRN: [id number]   LOCATION: [facility]     DATE OF PROCEDURE: 3/28/2024     PREOPERATIVE DIAGNOSES:   Right  1. knee medial complex meniscus tear   2. knee chondromalacia     POSTOPERATIVE DIAGNOSES:   Right  1. knee medial complex meniscus tear   2. knee chondromalacia  3. knee synovitis     PROCEDURE PERFORMED:   Right  1. knee arthroscopic partial medial meniscectomy (CPT 84179)  2. knee arthroscopic chondroplasty  3. knee arthroscopic partial synovectomy    SURGEON: ROBERT Colvin MD     ASSISTANTS:   Tiago Patton DO - Fellow - First Assist  SMA Ryan     First Assistant Duties: A first assistant was necessary for this procedure. Assistance was provided for surgical wound exposure, manipulation, and retractor placement and positioning. There was no qualified resident available for the procedure.    ANESTHESIA: General with local anesthetic injection (0.2% Naropin)    ESTIMATED BLOOD LOSS: Minimal    IMPLANTS: * No implants in log *     SPECIMENS: None.    COMPLICATIONS: None.     INTRAOPERATIVE COUNTS: Correct.     PROPHYLACTIC IV ANTIBIOTICS: Given per [facility] Protocol.    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  Exam and imaging has shown a symptomatic complex medial meniscus tear.  She has been indicated for arthroscopic intervention.  Full informed consent was obtained prior to proceeding.    PROCEDURE IN DETAIL:  The patient was identified in preop holding. Permit was obtained for the above procedure. IV antibiotic was initiated for prophylaxis and the patient was brought to the operating room.       After Anesthesia was administered, a Time Out was verbalized with all OR staff agreeing to the patient and procedure.      The right knee and leg were prepped and draped in the  usual sterile fashion.      Diagnostic arthroscopy was undertaken after establishing routine anteromedial and anterolateral scope portals.      Significant Findings:  1. Patellofemoral compartment - Minimal central eminence to medial facet grade 2 chondromalacia; intact trochlea; central patellar tracking without lateral patellar tilt or subluxation  2. Notch - Normal ACL and PCL  3. Medial Compartment - Meniscus, complex tear from the anterior portion of the posterior root to the midbody with multiple unstable degenerative segments - combination of vertical longitudinal and horizontal cleavage components. Cartilage, intact.    4. Lateral compartment - Meniscus, intact.  Cartilage, intact.    5. Loose bodies - None  6. Other - Mild synovitis over the anterior compartment with hypertrophic infrapatellar fat pad.    Detailed description:     1. Meniscectomy: This meniscus tear was not viable for repair.  Decision was made to proceed with partial meniscectomy.  A combination of the biter and shaver devices were utilized to debride the complex tear.  Tissue quality was poor.  Debridement was carried from the anterior root to the midbody.  The majority of the posterior root was completely intact.  A total of perhaps 15% of the total meniscal volume required resection.  There was a good bit of remaining posterior horn meniscus still intact after debridement.  2. Chondroplasty:  Limited chondroplasty was performed of the patella with a shaver.  All nonviable, torn articular cartilage was debrided back to a stable base.  3. Partial synovectomy:  The arthroscopic shaver and thermal ablator devices were utilized to debride back hypertrophied and inflamed infrapatellar fat pad and synovitis.  Care was taken to maintain hemostasis throughout.    Photos were taken. The portals were closed in standard fashion using 3-0 Monocryl suture with Steri-Strips.    The dressing was placed after local was administered subcutaneously and  the patient was awakened and transferred to the recovery room in satisfactory condition.  There were no apparent complications.     POSTOPERATIVE PLAN: Patient may weight bear as tolerates on crutches. Full range of motion to tolerance. Will start physical therapy right away. ASA 81 mg BID x 2 weeks for DVT prophylaxis.

## 2024-04-01 NOTE — PLAN OF CARE
[facility]   Physical Therapy Initial Evaluation      Name: Thao Ybarra  Clinic Number: [id number]    Therapy Diagnosis:   Encounter Diagnoses   Name Primary?    Acute internal derangement of right knee     Gait abnormality Yes    Weakness of right lower extremity     Decreased range of motion of right knee         Physician: Patrice Ching*    Physician Orders: PT Eval and Treat   Medical Diagnosis from Referral: Acute internal derangement of right knee [M23.91]   Evaluation Date: 4/1/2024  Authorization Period Expiration: 3/18/2025  Plan of Care Expiration: 5/27/2024  Progress Note Due: 5/1/2024  Date of Surgery: 3/28/2024  Visit # / Visits authorized: 1/ 1   FOTO: 1/ 3    Precautions: Standard and Diabetes     Time In: 1002am  Time Out: 1045am  Total Billable Time: 43 minutes    Subjective     Date of onset: 3/28/2024    History of current condition - Thao reports: that she played water polo in college and injured both knees a few times. This resulted in meniscus tears bilaterally. In 2007 she had her L knee scoped and it went well. A few weeks ago she was doing yoga and felt a pop in her R knee. An MRI confirmed a medial meniscus tear and on 3/28 she had a knee scope to clear up the tear. The day of the operation she was having a lot of pain but the next day her pain improved and she has been mostly pain free since then. She is not longer on medication and she is driving at this time. She is not using the crutches anymore, she can go up and down her stairs at home. 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 She does yoga 3x per week and would like to get back to this ASAP.    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     Pain:  Current 0/10, worst 8/10, best 0/10   Location: right medial knee, deep inside the joint  Description: Aching, Dull, and Sharp  Aggravating Factors: Standing, Bending, Walking, and Getting out of bed/chair  Easing Factors: pain medication, ice, and rest    Patients goals: to get back to doing yoga as soon as she can     Medical History:   Past Medical History:   Diagnosis Date    Diabetes mellitus type I     Hypothyroidism        Surgical History:   Thao Ybarra  has a past surgical history that includes  section; Colonoscopy (N/A, 2022); Knee arthroscopy w/ meniscectomy (Right, 3/28/2024); Synovectomy of knee (Right, 3/28/2024); and Arthroscopic chondroplasty of knee joint (Right, 3/28/2024).    Medications:   Thao has a current medication list which includes the following prescription(s): aspirin, blood sugar diagnostic, blood-glucose meter, dexcom g6 sensor, dexcom g6 transmitter, boostrix tdap, ergocalciferol, gvoke hypopen 2-pack, insulin, insulin aspart u-100, levothyroxine, lidocaine, ondansetron, oxycodone, prevnar 20 (pf), rosuvastatin, and sertraline.    Allergies:   Review of patient's allergies indicates:  No Known Allergies     Objective      Observation: pt ambulates into the clinic today independently and in no acute distress at this time. Her AYDIN hose is donned on her R LE at this time. There is a water proof bandage over both portal sights on her knee.     Gait: she ambulates independently. There is a non-antalgic gait pattern at this time. She does not achieve full knee extension on the R side at any point in the gait pattern    Range of Motion:   Knee Left active Left Passive Right Active R passive   Flexion 135 140 125 130 (slight pain)   Extension 0 +5 -5 0 (slight pain)       Lower Extremity Strength  Right LE  Left LE    Knee extension: 4/5 Knee extension: 5/5   Knee flexion: Not tested  Knee flexion: Not tested   Hip flexion: Not tested Hip flexion: Not tested   Hip extension:  3/5 Hip extension: 3+/5   Hip abduction: 3+/5 Hip abduction: 4/5     Function:    - Sit <--> Stand: slight L sided weight shift to offload R LE. Slight pain, able to perform without UE assistance  - Bed Mobility: independent     Joint Mobility: decreased R patellar and tibiofemoral joint mobility as expected post operatively    Palpation: there is some TTP around the incisions     Sensation: intact     Edema: mild edema noted       Intake Outcome Measure for FOTO Knee Survey    Therapist reviewed FOTO scores for Thao Ybarra on 4/1/2024.   FOTO report - see Media section or FOTO account episode details.    Intake Score: 59%         Treatment     Total Treatment time (time-based codes) separate from Evaluation: 18 minutes     Thao received the treatments listed below:      therapeutic exercises to develop strength, endurance, and ROM for 10 minutes including:    Heel props, 5 minutes  Quad sets, 10x  SLRs, 15x  SL hip abduction, 10x    manual therapy techniques: Joint mobilizations were applied to the: R knee for 08 minutes, including:    Patellar mobs in all direction, grade III  Knee extension over pressure  Anterior tibial mobs, grade III  Tibial ER mobs, grade III    Patient Education and Home Exercises     Education provided:   - HEP  - Plan of care  - Early post operative goals    Written Home Exercises Provided: Patient instructed to cont prior HEP. Exercises were reviewed and Thao was able to demonstrate them prior to the end of the session.  Thao demonstrated good  understanding of the education provided. See EMR under Patient Instructions for exercises provided during therapy sessions.    Assessment     Thao is a 43 y.o. female referred to outpatient Physical Therapy with a medical diagnosis of Acute internal derangement of right knee. Patient presents 4 days s/p a R knee meniscectomy on 3/28/2024. She  displays decreased and painful R knee ROM, decreased knee joint mobility, post operative swelling, R LE weakness, impaired functional movement patterns, and decreased functional independence.     Patient prognosis is Excellent.   Patient will benefit from skilled outpatient Physical Therapy to address the deficits stated above and in the chart below, provide patient /family education, and to maximize patientt's level of independence.     Plan of care discussed with patient: Yes  Patient's spiritual, cultural and educational needs considered and patient is agreeable to the plan of care and goals as stated below:     Anticipated Barriers for therapy: none    Medical Necessity is demonstrated by the following  History  Co-morbidities and personal factors that may impact the plan of care [] LOW: no personal factors / co-morbidities  [x] MODERATE: 1-2 personal factors / co-morbidities  [] HIGH: 3+ personal factors / co-morbidities    Moderate / High Support Documentation:   Co-morbidities affecting plan of care: Diabetes     Personal Factors:   no deficits     Examination  Body Structures and Functions, activity limitations and participation restrictions that may impact the plan of care [] LOW: addressing 1-2 elements  [x] MODERATE: 3+ elements  [] HIGH: 4+ elements (please support below)    Moderate / High Support Documentation: ROM, strength and motor control     Clinical Presentation [x] LOW: stable  [] MODERATE: Evolving  [] HIGH: Unstable     Decision Making/ Complexity Score: low       Goals:  Short Term Goals (4 Weeks):   1. Pt will be compliant with HEP to supplement PT in restoring pain free function.  2. Pt will be able to ambulate during her work day without pain to demonstrate improved overall functional ability.   3. Pt will improve impaired LE strength by 1/2 MMT grade to improve strength for functional tasks  4. Pt improve impaired R knee AROM to 0-130 deg in all planes to improve mobility for normal  movement patterns.   Long Term Goals (8 Weeks):  1. Pt will improve FOTO score to </= 82% to decrease perceived limitation with mobility  2. Pt will be able to return to yoga 3x per week to demonstrate improved overall functional ability.   3. Pt will improve impaired LE strength by 1 MMT grade to improve strength for functional tasks  4. Pt improve impaired R knee AROM to 0-135 deg in all planes to improve mobility for normal movement     Plan     Plan of care Certification: 4/1/2024 to 5/27/2024.    Outpatient Physical Therapy 1-2 times weekly for 8 weeks to include the following interventions: Gait Training, Manual Therapy, Moist Heat/ Ice, Neuromuscular Re-ed, Patient Education, Self Care, Therapeutic Activities, and Therapeutic Exercise.     MARCIE SALSA, PT    Physician's Signature: _________________________________________ Date: ________________

## 2024-04-03 ENCOUNTER — CLINICAL SUPPORT (OUTPATIENT)
Dept: REHABILITATION | Facility: HOSPITAL | Age: 44
End: 2024-04-03
Payer: COMMERCIAL

## 2024-04-03 DIAGNOSIS — M25.661 DECREASED RANGE OF MOTION OF RIGHT KNEE: ICD-10-CM

## 2024-04-03 DIAGNOSIS — R26.9 GAIT ABNORMALITY: Primary | ICD-10-CM

## 2024-04-03 DIAGNOSIS — R29.898 WEAKNESS OF RIGHT LOWER EXTREMITY: ICD-10-CM

## 2024-04-03 PROCEDURE — 97112 NEUROMUSCULAR REEDUCATION: CPT

## 2024-04-03 PROCEDURE — 97530 THERAPEUTIC ACTIVITIES: CPT

## 2024-04-03 PROCEDURE — 97110 THERAPEUTIC EXERCISES: CPT

## 2024-04-03 PROCEDURE — 97140 MANUAL THERAPY 1/> REGIONS: CPT

## 2024-04-03 NOTE — PROGRESS NOTES
"  Physical Therapy Daily Treatment Note     Name: Thao Ybarra  Clinic Number: [id number]    Therapy Diagnosis:   Encounter Diagnoses   Name Primary?    Gait abnormality Yes    Weakness of right lower extremity     Decreased range of motion of right knee      Physician: Patrice Ching    Visit Date: 4/3/2024    Physician Orders: PT Eval and Treat   Medical Diagnosis from Referral: Acute internal derangement of right knee [M23.91]   Evaluation Date: 4/1/2024  Authorization Period Expiration: 12/31/2024  Plan of Care Expiration: 5/27/2024  Progress Note Due: 5/1/2024  Date of Surgery: 3/28/2024  Visit # / Visits authorized: 1/20  FOTO: 1/ 3    1st FOTO Follow up:   2nd FOTO Follow up:     Time In: 800am  Time Out: 855am  Total Billable Time: 55 minutes    Precautions: Standard and Diabetes    Subjective     Pt reports: that her exercises have gone very well at home and she is still having minimal to no pain. 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  Response to previous treatment: decreased pain  Functional change: ongoing     Pain: 0/10  Location: right knee      Objective     Thao received therapeutic exercises to develop strength, endurance, and ROM for 11 minutes including:    Heel prop extension stretch with 2# above, 7 minutes  DL shuttle leg press, 75#, 4 minutes     Thao received the following manual therapy techniques: Joint mobilizations were applied to the: R knee for 10 minutes, including:    Patellar mobs in all direction, grade III  Knee extension over pressure  Anterior tibial mobs, grade III  Tibial ER mobs + hyperextension, grade III  Posterior tibial mobs + IR, grade III    Thao participated in neuromuscular re-education activities to improve: Balance, Coordination, Kinesthetic, Sense, and Proprioception for 25 minutes. The following activities were included:    Quad sets, 10x with 10" holds  SAQs with 3#, 3 x 8  LAQs with 3#, " "3 x 8  SLRs with 2#, 3 x 8  SL hip abduction, 2 x 10  Bridges, 2 x 10  SL stance on blue thera pad, 6 x 15" holds    Thao participated in dynamic functional therapeutic activities to improve functional performance for 09  minutes, including:    Ambulating over hurdles, focusing on R LE stance on the ground, 10 laps there and back (4 hurdles)  Sit to stands from chair with blue pad on seat, 3 x 8    Home Exercises Provided and Patient Education Provided     Education provided:   - HEP  - Plan of care  - Early post operative goals    Written Home Exercises Provided: Patient instructed to cont prior HEP.  Exercises were reviewed and Thao was able to demonstrate them prior to the end of the session.  Thao demonstrated good  understanding of the education provided.     See EMR under Patient Instructions for exercises provided  4/1/2024 .    Assessment     Thao presents today ambulating independently and demonstrates a good non antalgic gait pattern at this time. Her hyperextension was restored to 5 degrees today after manual interventions. She was further progressed with open chain quad activation exercises today and demonstrate great quad control at this point in her recovery process. Her knee flexion was measured at 133 degrees following joint mobilizations and there was no pain throughout the ROM. She tolerated all closed chain exercises very well today.     Thao Is progressing well towards her goals.   Pt prognosis is Excellent.     Pt will continue to benefit from skilled outpatient physical therapy to address the deficits listed in the problem list box on initial evaluation, provide pt/family education and to maximize pt's level of independence in the home and community environment.     Pt's spiritual, cultural and educational needs considered and pt agreeable to plan of care and goals.     Anticipated barriers to physical therapy: none    Goals:   Short Term Goals (4 Weeks):   1. Pt will be " compliant with HEP to supplement PT in restoring pain free function. (MET)  2. Pt will be able to ambulate during her work day without pain to demonstrate improved overall functional ability. (Progressing)  3. Pt will improve impaired LE strength by 1/2 MMT grade to improve strength for functional tasks. (Progressing)  4. Pt improve impaired R knee AROM to 0-130 deg in all planes to improve mobility for normal movement patterns. (Progressing)  Long Term Goals (8 Weeks):  1. Pt will improve FOTO score to </= 82% to decrease perceived limitation with mobility. (Progressing)  2. Pt will be able to return to yoga 3x per week to demonstrate improved overall functional ability. (Progressing)  3. Pt will improve impaired LE strength by 1 MMT grade to improve strength for functional tasks. (Progressing)  4. Pt improve impaired R knee AROM to 0-135 deg in all planes to improve mobility for normal movement. (Progressing)    Plan     Continue to progress per POC.    MARCIE SALAS, PT   Board Certified in Orthopedic Physical Therapy

## 2024-04-05 ENCOUNTER — PATIENT MESSAGE (OUTPATIENT)
Dept: ADMINISTRATIVE | Facility: HOSPITAL | Age: 44
End: 2024-04-05
Payer: COMMERCIAL

## 2024-04-08 ENCOUNTER — CLINICAL SUPPORT (OUTPATIENT)
Dept: REHABILITATION | Facility: HOSPITAL | Age: 44
End: 2024-04-08
Payer: COMMERCIAL

## 2024-04-08 DIAGNOSIS — M25.661 DECREASED RANGE OF MOTION OF RIGHT KNEE: ICD-10-CM

## 2024-04-08 DIAGNOSIS — R29.898 WEAKNESS OF RIGHT LOWER EXTREMITY: ICD-10-CM

## 2024-04-08 DIAGNOSIS — R26.9 GAIT ABNORMALITY: Primary | ICD-10-CM

## 2024-04-08 PROCEDURE — 97110 THERAPEUTIC EXERCISES: CPT

## 2024-04-08 PROCEDURE — 97140 MANUAL THERAPY 1/> REGIONS: CPT

## 2024-04-08 PROCEDURE — 97112 NEUROMUSCULAR REEDUCATION: CPT

## 2024-04-08 PROCEDURE — 97530 THERAPEUTIC ACTIVITIES: CPT

## 2024-04-08 NOTE — PROGRESS NOTES
Physical Therapy Daily Treatment Note     Name: Thao Ybarra  Clinic Number: [id number]    Therapy Diagnosis:   Encounter Diagnoses   Name Primary?    Gait abnormality Yes    Weakness of right lower extremity     Decreased range of motion of right knee        Physician: Patrice Ching    Visit Date: 4/8/2024    Physician Orders: PT Eval and Treat   Medical Diagnosis from Referral: Acute internal derangement of right knee [M23.91]   Evaluation Date: 4/1/2024  Authorization Period Expiration: 12/31/2024  Plan of Care Expiration: 5/27/2024  Progress Note Due: 5/1/2024  Date of Surgery: 3/28/2024  Visit # / Visits authorized: 2/20  FOTO: 1/ 3    1st FOTO Follow up:   2nd FOTO Follow up:     Time In: 905am  Time Out: 1000am  Total Billable Time: 55 minutes    Precautions: Standard and Diabetes    Subjective     Pt reports: that she overdid it a bit at work on Thursday and Friday last week. She didn't really have any more knee pain but the she very fatigued. Her pain and swelling have been very well controlled.   She was compliant with home exercise program.  Response to previous treatment: decreased pain  Functional change: ongoing     Pain: 0/10  Location: right knee      Objective     Thao received therapeutic exercises to develop strength, endurance, and ROM for 10 minutes including:    Heel prop extension stretch with 2# above, 6 minutes  DL shuttle leg press, 75#, 4 minutes     Thao received the following manual therapy techniques: Joint mobilizations were applied to the: R knee for 10 minutes, including:    Patellar mobs in all direction, grade III  Knee extension over pressure  Anterior tibial mobs, grade III  Tibial ER mobs + hyperextension, grade III  Posterior tibial mobs + IR, grade III    Thao participated in neuromuscular re-education activities to improve: Balance, Coordination, Kinesthetic, Sense, and Proprioception for 25 minutes. The following activities were included:    SAQs with 6#,  "3 x 8  LAQs with 6#, 3 x 8  SLRs with 5#, 3 x 8  SL hip abduction, 2 x 10  Bridges with BTB around knees, 3 x 10  SL stance on airex pad, 6 x 15" holds  SL stance on ground + steamboats, 10x on each side    Thao participated in dynamic functional therapeutic activities to improve functional performance for 10  minutes, including:    Ambulating over hurdles, focusing on R LE stance on the ground, 10 laps there and back (4 hurdles)  Sit to stands from chair, 3 x 8  Lateral walking with GTB around shins, 10 yards x4 laps    Home Exercises Provided and Patient Education Provided     Education provided:   - HEP  - Plan of care  - Early post operative goals    Written Home Exercises Provided: Patient instructed to cont prior HEP.  Exercises were reviewed and Thao was able to demonstrate them prior to the end of the session.  Thao demonstrated good  understanding of the education provided.     See EMR under Patient Instructions for exercises provided  4/1/2024 .    Assessment     Thao presents today ambulating independently with a good strep through gait pattern with equal stance time bilaterally. Her knee hyperextension remains at +5 degrees today and there is not pain with overpressure. Her knee flexion was progressed to 135 degrees and was also pain free today. Her quad strength was further progressed as resistance was increased on all open chain exercises. She was able to avoid a quad lag with the increased resistance during SLRs. SL balance was progressed to include dynamic activities today and her maintained good stability.     Thao Is progressing well towards her goals.   Pt prognosis is Excellent.     Pt will continue to benefit from skilled outpatient physical therapy to address the deficits listed in the problem list box on initial evaluation, provide pt/family education and to maximize pt's level of independence in the home and community environment.     Pt's spiritual, cultural and educational " needs considered and pt agreeable to plan of care and goals.     Anticipated barriers to physical therapy: none    Goals:   Short Term Goals (4 Weeks):   1. Pt will be compliant with HEP to supplement PT in restoring pain free function. (MET)  2. Pt will be able to ambulate during her work day without pain to demonstrate improved overall functional ability. (Progressing)  3. Pt will improve impaired LE strength by 1/2 MMT grade to improve strength for functional tasks. (Progressing)  4. Pt improve impaired R knee AROM to 0-130 deg in all planes to improve mobility for normal movement patterns. (Progressing)  Long Term Goals (8 Weeks):  1. Pt will improve FOTO score to </= 82% to decrease perceived limitation with mobility. (Progressing)  2. Pt will be able to return to yoga 3x per week to demonstrate improved overall functional ability. (Progressing)  3. Pt will improve impaired LE strength by 1 MMT grade to improve strength for functional tasks. (Progressing)  4. Pt improve impaired R knee AROM to 0-135 deg in all planes to improve mobility for normal movement. (Progressing)    Plan     Continue to progress per POC.    MARCIE SALAS, PT   Board Certified in Orthopedic Physical Therapy

## 2024-04-09 ENCOUNTER — TELEPHONE (OUTPATIENT)
Dept: SPORTS MEDICINE | Facility: CLINIC | Age: 44
End: 2024-04-09
Payer: COMMERCIAL

## 2024-04-09 NOTE — TELEPHONE ENCOUNTER
Faxed FMLA forms to [employer]. Confirmation received at 0814 on 04/09/24.     Scanned into Media. Pt notified via [employer].

## 2024-04-11 ENCOUNTER — OFFICE VISIT (OUTPATIENT)
Dept: SPORTS MEDICINE | Facility: CLINIC | Age: 44
End: 2024-04-11
Payer: COMMERCIAL

## 2024-04-11 VITALS
WEIGHT: 180.75 LBS | BODY MASS INDEX: 24.48 KG/M2 | HEIGHT: 72 IN | HEART RATE: 57 BPM | DIASTOLIC BLOOD PRESSURE: 87 MMHG | SYSTOLIC BLOOD PRESSURE: 133 MMHG

## 2024-04-11 DIAGNOSIS — Z98.890 S/P MENISCECTOMY: Primary | ICD-10-CM

## 2024-04-11 PROCEDURE — 1160F RVW MEDS BY RX/DR IN RCRD: CPT | Mod: CPTII,S$GLB,, | Performed by: PHYSICIAN ASSISTANT

## 2024-04-11 PROCEDURE — 3079F DIAST BP 80-89 MM HG: CPT | Mod: CPTII,S$GLB,, | Performed by: PHYSICIAN ASSISTANT

## 2024-04-11 PROCEDURE — 99024 POSTOP FOLLOW-UP VISIT: CPT | Mod: S$GLB,,, | Performed by: PHYSICIAN ASSISTANT

## 2024-04-11 PROCEDURE — 99999 PR PBB SHADOW E&M-EST. PATIENT-LVL IV: CPT | Mod: PBBFAC,,, | Performed by: PHYSICIAN ASSISTANT

## 2024-04-11 PROCEDURE — 3075F SYST BP GE 130 - 139MM HG: CPT | Mod: CPTII,S$GLB,, | Performed by: PHYSICIAN ASSISTANT

## 2024-04-11 PROCEDURE — 1159F MED LIST DOCD IN RCRD: CPT | Mod: CPTII,S$GLB,, | Performed by: PHYSICIAN ASSISTANT

## 2024-04-11 NOTE — PROGRESS NOTES
S:Thao Ybarra presents for post-operative evaluation.     DATE OF PROCEDURE: 3/28/2024      PROCEDURE PERFORMED:   Right  1. knee arthroscopic partial medial meniscectomy (CPT 90532)  2. knee arthroscopic chondroplasty  3. knee arthroscopic partial synovectomy    Thao Ybarra reports to be doing well 2wk s/p the above mentioned procedure. Denies fevers, chills, night sweats, chest pain, difficulty breathing, calf pain or tenderness. Going to PT 2xWeek at the [place] location. Seeing good progress daily. Pain levels are improving. Has d/c'd pain medication. She does report areas of redness surrounding both portal incisions. Not painful but itchy. Says she put topical steroid around the area and it has improved.    O: The incisions are healing well. There are two well circumscribed areas of erythema surrounding each portal incision. Nontender, just itchy. No drainage or dehiscence from the actual incisions. No signs of infection. Photo in chart. Likely allergic response as opposed to infection. Sutures absorbable. No significant pain or unusual tenderness. aROM knee 0-120. Quad activating strong. Able to maintain straight leg raise.     A/P: Patient is doing great overall. Incisions healing well. Thinking thi is likely an allergic response to either the local anesthetic injection/bandages. Stop topical steroid. Should continue to improve/ Ok to shower with incisions uncovered. No submerging at this point. Plan to follow the rehab plan as previously outlined. RTC in 4 weeks.     POSTOPERATIVE PLAN: Patient may weight bear as tolerates on crutches. Full range of motion to tolerance. Will start physical therapy right away. ASA 81 mg BID x 2 weeks for DVT prophylaxis.

## 2024-04-15 ENCOUNTER — PATIENT MESSAGE (OUTPATIENT)
Dept: REHABILITATION | Facility: HOSPITAL | Age: 44
End: 2024-04-15

## 2024-04-15 ENCOUNTER — CLINICAL SUPPORT (OUTPATIENT)
Dept: REHABILITATION | Facility: HOSPITAL | Age: 44
End: 2024-04-15
Payer: COMMERCIAL

## 2024-04-15 DIAGNOSIS — R29.898 WEAKNESS OF RIGHT LOWER EXTREMITY: ICD-10-CM

## 2024-04-15 DIAGNOSIS — M25.661 DECREASED RANGE OF MOTION OF RIGHT KNEE: ICD-10-CM

## 2024-04-15 DIAGNOSIS — R26.9 GAIT ABNORMALITY: Primary | ICD-10-CM

## 2024-04-15 PROCEDURE — 97140 MANUAL THERAPY 1/> REGIONS: CPT

## 2024-04-15 PROCEDURE — 97112 NEUROMUSCULAR REEDUCATION: CPT

## 2024-04-15 PROCEDURE — 97530 THERAPEUTIC ACTIVITIES: CPT

## 2024-04-15 PROCEDURE — 97110 THERAPEUTIC EXERCISES: CPT

## 2024-04-15 NOTE — PROGRESS NOTES
Physical Therapy Daily Treatment Note     Name: Thao Ybarra  Clinic Number: [id number]    Therapy Diagnosis:   Encounter Diagnoses   Name Primary?    Gait abnormality Yes    Weakness of right lower extremity     Decreased range of motion of right knee      Physician: Patrice Ching    Visit Date: 4/15/2024    Physician Orders: PT Eval and Treat   Medical Diagnosis from Referral: Acute internal derangement of right knee [M23.91]   Evaluation Date: 4/1/2024  Authorization Period Expiration: 12/31/2024  Plan of Care Expiration: 5/27/2024  Progress Note Due: 5/1/2024  Date of Surgery: 3/28/2024  Visit # / Visits authorized: 2/20  FOTO: 1/ 3    1st FOTO Follow up:   2nd FOTO Follow up:     Time In: 900am   Time Out: 955am   Total Billable Time: 55 minutes    Precautions: Standard and Diabetes    Subjective     Pt reports: that everything feels amazing. She really is feeling much better than she was before surgery. She has been able to get back into her Yoga exercises without pain and work is also pain free.   She was compliant with home exercise program.  Response to previous treatment: decreased pain  Functional change: ongoing     Pain: 0/10  Location: right knee      Objective     Thao received therapeutic exercises to develop strength, endurance, and ROM for 10 minutes including:    Heel prop extension stretch with 5# above and below knee, 6 minutes  DL shuttle leg press, 75#, 4 minutes     Thao received the following manual therapy techniques: Joint mobilizations were applied to the: R knee for 10 minutes, including:    Patellar mobs in all direction, grade III  Knee extension over pressure  Anterior tibial mobs, grade III  Tibial ER mobs + hyperextension, grade III  Posterior tibial mobs + IR, grade III    Thao participated in neuromuscular re-education activities to improve: Balance, Coordination, Kinesthetic, Sense, and Proprioception for 25 minutes. The following activities were  "included:    LAQs on machine, single leg, 15#, 4  x 8  SLRs with 5#, 4 x 8  Modified side plank + clamshell with BTB, 3 x 8 on each side  Bridges with alternating march, 3 x 10  Lateral walking with GTB around shins, 10 yards x4 laps  SL stance on airex pad, 6 x 15" holds  SL stance on ground + steamboats, 10x on each side    Thao participated in dynamic functional therapeutic activities to improve functional performance for 10 minutes, including:    Step ups on 6" step, 3 x 8 on the R  Step downs on 4" step, 3 x 6 on the R  Sit to stands from chair holding 15#, 3 x 8    Home Exercises Provided and Patient Education Provided     Education provided:   - HEP  - Plan of care  - Early post operative goals    Written Home Exercises Provided: Patient instructed to cont prior HEP.  Exercises were reviewed and Thao was able to demonstrate them prior to the end of the session.  Thao demonstrated good  understanding of the education provided.     See EMR under Patient Instructions for exercises provided  4/1/2024 .    Assessment     Thao remains with no pain at this time and has minimal to no pain throughout her day. Her knee extension and flexion both remain full at this time but manual interventions and mobility based exercises continue to be performed at this time. Her quad control was further progressed in the open chain today with good tolerance. She is able to tolerate 5# during SLRs without evidence of a quad lag. Closed chain stability was also progressed today with good tolerance.     Thao Is progressing well towards her goals.   Pt prognosis is Excellent.     Pt will continue to benefit from skilled outpatient physical therapy to address the deficits listed in the problem list box on initial evaluation, provide pt/family education and to maximize pt's level of independence in the home and community environment.     Pt's spiritual, cultural and educational needs considered and pt agreeable to plan of " care and goals.     Anticipated barriers to physical therapy: none    Goals:   Short Term Goals (4 Weeks):   1. Pt will be compliant with HEP to supplement PT in restoring pain free function. (MET)  2. Pt will be able to ambulate during her work day without pain to demonstrate improved overall functional ability. (Progressing)  3. Pt will improve impaired LE strength by 1/2 MMT grade to improve strength for functional tasks. (Progressing)  4. Pt improve impaired R knee AROM to 0-130 deg in all planes to improve mobility for normal movement patterns. (Progressing)  Long Term Goals (8 Weeks):  1. Pt will improve FOTO score to </= 82% to decrease perceived limitation with mobility. (Progressing)  2. Pt will be able to return to yoga 3x per week to demonstrate improved overall functional ability. (Progressing)  3. Pt will improve impaired LE strength by 1 MMT grade to improve strength for functional tasks. (Progressing)  4. Pt improve impaired R knee AROM to 0-135 deg in all planes to improve mobility for normal movement. (Progressing)    Plan     Continue to progress per POC.    MARCIE SALAS, PT

## 2024-04-17 ENCOUNTER — PATIENT MESSAGE (OUTPATIENT)
Dept: SPORTS MEDICINE | Facility: CLINIC | Age: 44
End: 2024-04-17
Payer: COMMERCIAL

## 2024-04-18 ENCOUNTER — TELEPHONE (OUTPATIENT)
Dept: SPORTS MEDICINE | Facility: CLINIC | Age: 44
End: 2024-04-18
Payer: COMMERCIAL

## 2024-04-18 ENCOUNTER — CLINICAL SUPPORT (OUTPATIENT)
Dept: REHABILITATION | Facility: HOSPITAL | Age: 44
End: 2024-04-18
Payer: COMMERCIAL

## 2024-04-18 DIAGNOSIS — M25.661 DECREASED RANGE OF MOTION OF RIGHT KNEE: ICD-10-CM

## 2024-04-18 DIAGNOSIS — R29.898 WEAKNESS OF RIGHT LOWER EXTREMITY: ICD-10-CM

## 2024-04-18 DIAGNOSIS — R26.9 GAIT ABNORMALITY: Primary | ICD-10-CM

## 2024-04-18 PROCEDURE — 97530 THERAPEUTIC ACTIVITIES: CPT

## 2024-04-18 PROCEDURE — 97112 NEUROMUSCULAR REEDUCATION: CPT

## 2024-04-18 PROCEDURE — 97110 THERAPEUTIC EXERCISES: CPT

## 2024-04-18 PROCEDURE — 97140 MANUAL THERAPY 1/> REGIONS: CPT

## 2024-04-18 NOTE — PROGRESS NOTES
Physical Therapy Daily Treatment Note     Name: Thao Ybarra  Clinic Number: [id number]    Therapy Diagnosis:   Encounter Diagnoses   Name Primary?    Gait abnormality Yes    Weakness of right lower extremity     Decreased range of motion of right knee        Physician: Patrice Ching*    Visit Date: 4/18/2024    Physician Orders: PT Eval and Treat   Medical Diagnosis from Referral: Acute internal derangement of right knee [M23.91]   Evaluation Date: 4/1/2024  Authorization Period Expiration: 12/31/2024  Plan of Care Expiration: 5/27/2024  Progress Note Due: 5/1/2024    Date of Surgery: 3/28/2024  Post Op day: 3 weeks as of 4/18/24    Visit # / Visits authorized: 4/20  FOTO: 1/ 3    1st FOTO Follow up:   2nd FOTO Follow up:     Time In: 8:55 AM   Time Out: 9:55 AM   Total Billable Time: 60 minutes    Precautions: Standard and Diabetes    Subjective     Pt reports: that her knee feels good and she feels it is getting back to normal compared to the left knee.     She was compliant with home exercise program.    Response to previous treatment: decreased pain  Functional change: ongoing     Pain: 0/10  Location: right knee      Objective     Range of Motion:   Knee Left active Left Passive Right Active R passive   Flexion 140 140 125 135   Extension 0 +5 0 0 (no pain)        Treatment      Thao received therapeutic exercises to develop strength, endurance, and ROM for 09 minutes including:    DL shuttle leg press, 75#, 3 x 10   SL shuttle leg press, 50#, 2 x 12    Not performed today:  Heel prop extension stretch with 5# above and below knee, 6 minutes    Thao received the following manual therapy techniques: Joint mobilizations were applied to the: R knee for 08 minutes, including:    Patellar mobs in all direction, grade III  Knee extension over pressure  Anterior tibial mobs, grade III    Not performed today:  Tibial ER mobs + hyperextension, grade III  Posterior tibial mobs + IR, grade III    Thao  "participated in neuromuscular re-education activities to improve: Balance, Coordination, Kinesthetic, Sense, and Proprioception for 31 minutes. The following activities were included:    SLRs with 5#, 4 x 8  Lateral walking with BTB around shins, 10 yards x 4 laps  Lateral Step up (4 inch box) with TKEs into Blue Tband, 2 x 15 with 5" hold   Modified side plank + clamshell with BTB, 3 x 8 on each side  Bridges with alternating march, 3 x 10  LAQs on machine, single leg, 15#, 4  x 8 (cueing for eccentric lowering)  SL stance on airex pad, 6 x 15" holds  SL stance on ground + steamboats, 2 x 10 on each side    Thao participated in dynamic functional therapeutic activities to improve functional performance for 12 minutes, including:    Step ups fwd and lateral on 6" step with 15# KTB hold, 2 x 10 on the R  Step downs on 4" step, 3 x 6 on the R  Sit to stands from chair holding 15# KTB, 3 x 8    Home Exercises Provided and Patient Education Provided     Education provided:   - HEP  - Plan of care  - Early post operative goals    Written Home Exercises Provided: Patient instructed to cont prior HEP.  Exercises were reviewed and Thao was able to demonstrate them prior to the end of the session.  Thao demonstrated good  understanding of the education provided.     See EMR under Patient Instructions for exercises provided  4/1/2024 .    Assessment     Thao presents to clinic with no Right knee pain at this time and has adequate knee extension and flexion. Both remain full at this time, but manual interventions and mobility based exercises continue to be performed at this time. Her quad control was further progressed in closed chain and open chain today with good tolerance. Able to conduct balance activities without need for upper extremity support. Patient will continue to progress as tolerated and build on right leg quad strength and surrounding hip musculature to offload right knee.     Thao Is progressing " well towards her goals.     Pt prognosis is Excellent.     Pt will continue to benefit from skilled outpatient physical therapy to address the deficits listed in the problem list box on initial evaluation, provide pt/family education and to maximize pt's level of independence in the home and community environment.     Pt's spiritual, cultural and educational needs considered and pt agreeable to plan of care and goals.     Anticipated barriers to physical therapy: none    Goals:   Short Term Goals (4 Weeks):   1. Pt will be compliant with HEP to supplement PT in restoring pain free function. (MET)  2. Pt will be able to ambulate during her work day without pain to demonstrate improved overall functional ability. (Progressing)  3. Pt will improve impaired LE strength by 1/2 MMT grade to improve strength for functional tasks. (Progressing)  4. Pt improve impaired R knee AROM to 0-130 deg in all planes to improve mobility for normal movement patterns. (Progressing)  Long Term Goals (8 Weeks):  1. Pt will improve FOTO score to </= 82% to decrease perceived limitation with mobility. (Progressing)  2. Pt will be able to return to yoga 3x per week to demonstrate improved overall functional ability. (Progressing)  3. Pt will improve impaired LE strength by 1 MMT grade to improve strength for functional tasks. (Progressing)  4. Pt improve impaired R knee AROM to 0-135 deg in all planes to improve mobility for normal movement. (Progressing)    Plan     Continue to progress per POC.    Darren Kimball, PT

## 2024-04-18 NOTE — TELEPHONE ENCOUNTER
Faxed [employer] forms. Confirmation received at 1044 on 04/18/24.     Scanned into [employer]. Pt notified via [employer].

## 2024-04-22 ENCOUNTER — CLINICAL SUPPORT (OUTPATIENT)
Dept: REHABILITATION | Facility: HOSPITAL | Age: 44
End: 2024-04-22
Payer: COMMERCIAL

## 2024-04-22 DIAGNOSIS — R26.9 GAIT ABNORMALITY: Primary | ICD-10-CM

## 2024-04-22 DIAGNOSIS — M25.661 DECREASED RANGE OF MOTION OF RIGHT KNEE: ICD-10-CM

## 2024-04-22 DIAGNOSIS — R29.898 WEAKNESS OF RIGHT LOWER EXTREMITY: ICD-10-CM

## 2024-04-22 PROCEDURE — 97140 MANUAL THERAPY 1/> REGIONS: CPT

## 2024-04-22 PROCEDURE — 97110 THERAPEUTIC EXERCISES: CPT

## 2024-04-22 PROCEDURE — 97112 NEUROMUSCULAR REEDUCATION: CPT

## 2024-04-22 PROCEDURE — 97530 THERAPEUTIC ACTIVITIES: CPT

## 2024-04-22 NOTE — PROGRESS NOTES
Physical Therapy Daily Treatment Note     Name: Thao Ybarra  Clinic Number: [id number]    Therapy Diagnosis:   Encounter Diagnoses   Name Primary?    Gait abnormality Yes    Weakness of right lower extremity     Decreased range of motion of right knee        Physician: Patrice Ching*    Visit Date: 4/22/2024    Physician Orders: PT Eval and Treat   Medical Diagnosis from Referral: Acute internal derangement of right knee [M23.91]   Evaluation Date: 4/1/2024  Authorization Period Expiration: 12/31/2024  Plan of Care Expiration: 5/27/2024  Progress Note Due: 5/1/2024  Date of Surgery: 3/28/2024  Post Op day: 3 weeks as of 4/18/24  Visit # / Visits authorized: 5/20  FOTO: 1/ 3    1st FOTO Follow up:   2nd FOTO Follow up:     Time In: 855am  Time Out: 957am  Total Billable Time: 58 minutes    Precautions: Standard and Diabetes    Subjective     Pt reports: that the knee continues to feel really good. She is having no pain and is able to be active throughout her day.   She was compliant with home exercise program.    Response to previous treatment: decreased pain  Functional change: ongoing     Pain: 0/10  Location: right knee      Objective     Range of Motion:   Knee Left active Left Passive Right Active R passive   Flexion 140 140 135 140   Extension 0 +5 0 +5 (no pain)        Treatment      Thao received therapeutic exercises to develop strength, endurance, and ROM for 10 minutes including:    Upright bike, 6 minutes, level 4 resistance   SL shuttle leg press, 62#, 3 x 8 on each side    Thao received the following manual therapy techniques: Joint mobilizations were applied to the: R knee for 08 minutes, including:    Patellar mobs in all direction, grade III  Knee extension over pressure  Tibial ER mobs + hyperextension, grade III  Anterior tibial mobs, grade III    Not performed today:  Posterior tibial mobs + IR, grade III    Thao participated in neuromuscular re-education activities to improve:  "Balance, Coordination, Kinesthetic, Sense, and Proprioception for 28 minutes. The following activities were included:    LAQs on machine, single leg, 20#, 4 x 8 (cueing for eccentric lowering)  SL bridges on BOSU, 3 x 8 on each side  Long sitting SLRs with 4#, 4 x 8  Hip hikes on the wall with ball, 2 x 10 on each side  Lateral walking with BTB around shins, 10 yards x 4 laps  SL stance on airex + steamboats, 2 x 10 on each side    Thao participated in dynamic functional therapeutic activities to improve functional performance for 12 minutes, including:    Lunges with foot on furniture slider, 3 x 8 on each side  Step ups on 8" step with 15#, 2 x 10 on each side  Step downs on 6" step, 3 x 6 on each side  Sit to stands from chair holding 15# KTB, 3 x 8 (not today)    Home Exercises Provided and Patient Education Provided     Education provided:   - HEP  - Plan of care  - Early post operative goals    Written Home Exercises Provided: Patient instructed to cont prior HEP.  Exercises were reviewed and Thao was able to demonstrate them prior to the end of the session.  Thao demonstrated good  understanding of the education provided.     See EMR under Patient Instructions for exercises provided  4/1/2024 .    Assessment     Thao presents today ambulating pain free without any gait abnormalities at this time. She was able to achieve 5 degrees of hyperextension today without any pain. Her flexion ROM was also pain free and is equal compared to the L LE. She was further progressed with quad and hip strengthening today. She is being further challenged with closed chain stability exercises to help progress her back to her yoga activities.     Thao Is progressing well towards her goals.     Pt prognosis is Excellent.     Pt will continue to benefit from skilled outpatient physical therapy to address the deficits listed in the problem list box on initial evaluation, provide pt/family education and to maximize " pt's level of independence in the home and community environment.     Pt's spiritual, cultural and educational needs considered and pt agreeable to plan of care and goals.     Anticipated barriers to physical therapy: none    Goals:   Short Term Goals (4 Weeks):   1. Pt will be compliant with HEP to supplement PT in restoring pain free function. (MET)  2. Pt will be able to ambulate during her work day without pain to demonstrate improved overall functional ability. (Progressing)  3. Pt will improve impaired LE strength by 1/2 MMT grade to improve strength for functional tasks. (Progressing)  4. Pt improve impaired R knee AROM to 0-130 deg in all planes to improve mobility for normal movement patterns. (Progressing)  Long Term Goals (8 Weeks):  1. Pt will improve FOTO score to </= 82% to decrease perceived limitation with mobility. (Progressing)  2. Pt will be able to return to yoga 3x per week to demonstrate improved overall functional ability. (Progressing)  3. Pt will improve impaired LE strength by 1 MMT grade to improve strength for functional tasks. (Progressing)  4. Pt improve impaired R knee AROM to 0-135 deg in all planes to improve mobility for normal movement. (Progressing)    Plan     Continue to progress per POC.    MARCIE SALAS, PT   Board Certified in Orthopedic Physical Therapy

## 2024-04-24 ENCOUNTER — CLINICAL SUPPORT (OUTPATIENT)
Dept: REHABILITATION | Facility: HOSPITAL | Age: 44
End: 2024-04-24
Payer: COMMERCIAL

## 2024-04-24 DIAGNOSIS — M25.661 DECREASED RANGE OF MOTION OF RIGHT KNEE: ICD-10-CM

## 2024-04-24 DIAGNOSIS — R26.9 GAIT ABNORMALITY: Primary | ICD-10-CM

## 2024-04-24 DIAGNOSIS — R29.898 WEAKNESS OF RIGHT LOWER EXTREMITY: ICD-10-CM

## 2024-04-24 PROCEDURE — 97530 THERAPEUTIC ACTIVITIES: CPT

## 2024-04-24 PROCEDURE — 97110 THERAPEUTIC EXERCISES: CPT

## 2024-04-24 PROCEDURE — 97112 NEUROMUSCULAR REEDUCATION: CPT

## 2024-04-24 NOTE — PROGRESS NOTES
Physical Therapy Daily Treatment and Progress Note     Name: Thao Ybarra  Clinic Number: [id number]    Therapy Diagnosis:   Encounter Diagnoses   Name Primary?    Gait abnormality Yes    Weakness of right lower extremity     Decreased range of motion of right knee        Physician: Patrice Ching*    Visit Date: 4/24/2024    Physician Orders: PT Eval and Treat   Medical Diagnosis from Referral: Acute internal derangement of right knee [M23.91]   Evaluation Date: 4/1/2024  Authorization Period Expiration: 12/31/2024  Plan of Care Expiration: 5/27/2024  Progress Note Due: 5/24/2024  Date of Surgery: 3/28/2024  Post Op day: 3 weeks, 6 days as of 4/24/24    Visit # / Visits authorized:  6/20  FOTO: 1/ 3    1st FOTO Follow up:   2nd FOTO Follow up:     Time In: 9:04 AM  Time Out: 10:00 AM  Total Billable Time: 56 minutes    Precautions: Standard and Diabetes    Subjective     Pt reports: that the knee feels great and she has been able to return to yoga practice 3x/day. Not having much difficulty at work right now, but is not on her feet all day.     She was compliant with home exercise program.    Response to previous treatment: decreased pain  Functional change: ongoing     Pain: 0/10  Location: right knee      Objective      Re-assessment: (4/24/24)     Gait: she ambulates independently with a non-antalgic gait pattern and is able to achieve knee extension in midstance     Range of Motion:    Knee Left active Left Passive Right Active R passive   Flexion 140 140 135 140   Extension 0 +5 0 +5 (no pain)     Lower Extremity Strength  Right LE   Left LE     Knee extension: 4+/5 Knee extension: 5/5   Hip extension:  3+/5 Hip extension: 3+/5   Hip abduction: 3+/5  Hip abduction: 4/5      Function:  - Sit <--> Stand: minimal L sided weight shift to offload R LE. Able to correct following cueing No pain during motion      Joint Mobility: normalized R patellar mobility and tibiofemoral joint mobility at this stage of  "post operative recovery     Treatment      Thao received therapeutic exercises to develop strength, endurance, and ROM for 15 minutes including:    Re-assessment noted above  Upright bike, 7 minutes, level 4 resistance   SL shuttle leg press, 62#, 3 x 8 on each side    Thao received the following manual therapy techniques: Joint mobilizations were applied to the: R knee for 00 minutes, including:    Patellar mobs in all direction, grade III  Knee extension over pressure  Tibial ER mobs + hyperextension, grade III  Anterior tibial mobs, grade III    Not performed today:  Posterior tibial mobs + IR, grade III    Thao participated in neuromuscular re-education activities to improve: Balance, Coordination, Kinesthetic, Sense, and Proprioception for 25 minutes. The following activities were included:    Sideplanks with Clamshells BTB, 3 x 8 each side   Open chain Knee ext on Precor machine, single leg, 20#, 4 x 8 (cueing for eccentric lowering)  Planks with Hip extension Bilateral, 3 x 5 (cueing for form)  Lateral walking with BTB around shins, 10 yards x 4 laps  SL stance on airex + cone taps, 2 x 10 on each side    Not performed today:  SL bridges on BOSU, 3 x 8 on each side  Hip hikes on the wall with ball, 2 x 10 on each side      Thao participated in dynamic functional therapeutic activities to improve functional performance for 16 minutes, including:    Lunges with foot on furniture slider, 3 x 8 on each side  Step ups on 8" step with 15# KTB, 2 x 10 on each side  Heel taps from 4" step with 10# KTB hold, 3 x 5 on each side  Sit to stands from chair holding 15# KTB, 3 x 8     Home Exercises Provided and Patient Education Provided     Education provided:   - HEP  - Plan of care  - Early post operative goals    Written Home Exercises Provided: Patient instructed to cont prior HEP.  Exercises were reviewed and Thao was able to demonstrate them prior to the end of the session.  Thao demonstrated good  " understanding of the education provided.     See EMR under Patient Instructions for exercises provided  4/1/2024 .    Assessment     Thao presents for her re-assessment visit today and is 3 weeks, 6 days status post Right knee medial menisectomy. She ambulates pain-free without any gait abnormalities at this time and has exhibited equalized knee extension and flexion range of motion. She is able to achieve 5 degrees of hyperextension without any pain. Continued focus of sessions remains balance and single leg quad strength as well as lateral hip coordination and strength to better support her knee staying out of valgus collapse. Patient has met all short term goals at this time and will continue with being further challenged with closed chain stability exercises to help progress her back to her yoga activities.     Thao Is progressing well towards her goals.     Pt prognosis is Excellent.     Pt will continue to benefit from skilled outpatient physical therapy to address the deficits listed in the problem list box on initial evaluation, provide pt/family education and to maximize pt's level of independence in the home and community environment.     Pt's spiritual, cultural and educational needs considered and pt agreeable to plan of care and goals.     Anticipated barriers to physical therapy: none    Goals:   Short Term Goals (4 Weeks):   1. Pt will be compliant with HEP to supplement PT in restoring pain free function. (MET)  2. Pt will be able to ambulate during her work day without pain to demonstrate improved overall functional ability. - Met (4/24/24)  3. Pt will improve impaired LE strength by 1/2 MMT grade to improve strength for functional tasks. - Met (4/24/24)  4. Pt improve impaired R knee AROM to 0-130 deg in all planes to improve mobility for normal movement patterns. - Met (4/24/24)    Long Term Goals (8 Weeks):  1. Pt will improve FOTO score to </= 82% to decrease perceived limitation with  mobility. (Progressing)  2. Pt will be able to return to yoga 3x per week to demonstrate improved overall functional ability. - Met (4/24/24)  3. Pt will improve impaired LE strength by 1 MMT grade to improve strength for functional tasks. (Progressing)  4. Pt improve impaired R knee AROM to 0-135 deg in all planes to improve mobility for normal movement. - Met (4/24/24)    Plan     Continue to progress per POC.    Darren Kimball, PT

## 2024-04-29 ENCOUNTER — TELEPHONE (OUTPATIENT)
Dept: SPORTS MEDICINE | Facility: CLINIC | Age: 44
End: 2024-04-29
Payer: COMMERCIAL

## 2024-04-29 NOTE — TELEPHONE ENCOUNTER
Left message for patient to call back to reschedule appointment on 5/9 due to Dr Drummond being out.

## 2024-05-02 ENCOUNTER — CLINICAL SUPPORT (OUTPATIENT)
Dept: REHABILITATION | Facility: HOSPITAL | Age: 44
End: 2024-05-02
Payer: COMMERCIAL

## 2024-05-02 DIAGNOSIS — M25.661 DECREASED RANGE OF MOTION OF RIGHT KNEE: ICD-10-CM

## 2024-05-02 DIAGNOSIS — R29.898 WEAKNESS OF RIGHT LOWER EXTREMITY: ICD-10-CM

## 2024-05-02 DIAGNOSIS — R26.9 GAIT ABNORMALITY: Primary | ICD-10-CM

## 2024-05-02 PROCEDURE — 97110 THERAPEUTIC EXERCISES: CPT

## 2024-05-02 PROCEDURE — 97530 THERAPEUTIC ACTIVITIES: CPT

## 2024-05-02 PROCEDURE — 97112 NEUROMUSCULAR REEDUCATION: CPT

## 2024-05-02 NOTE — PROGRESS NOTES
Physical Therapy Daily Treatment Note     Name: Thao Ybarra  Clinic Number: [id number]    Therapy Diagnosis:   Encounter Diagnoses   Name Primary?    Gait abnormality Yes    Weakness of right lower extremity     Decreased range of motion of right knee          Physician: Patrice Ching*    Visit Date: 5/2/2024    Physician Orders: PT Eval and Treat   Medical Diagnosis from Referral: Acute internal derangement of right knee [M23.91]   Evaluation Date: 4/1/2024  Authorization Period Expiration: 12/31/2024  Plan of Care Expiration: 5/27/2024  Progress Note Due: 5/24/2024    Date of Surgery: 3/28/2024  Post Op day: 5 weeks, 0 days as of 5/2/24    Visit # / Visits authorized:  7/20  FOTO: 1/ 3    1st FOTO Follow up:   2nd FOTO Follow up:     Time In: 9:01 AM  Time Out: 10:00 AM  Total Billable Time: 59 minutes    Precautions: Standard and Diabetes    Subjective     Pt reports: that the knee feels great and she has been able to continue with yoga. No pain in the knee.     She was compliant with home exercise program.    Response to previous treatment: decreased pain  Functional change: ongoing     Pain: 0/10  Location: right knee      Objective      Range of Motion:    Knee Left active Left Passive Right Active R passive   Flexion 140 140 140 140   Extension 0 +5 0 +5 (no pain)        Treatment      Thao received therapeutic exercises to develop strength, endurance, and ROM for 09 minutes including:    Upright bike, 7 minutes, level 4 resistance   SL shuttle leg press, 62#, 3 x 8 on each side    Thao received the following manual therapy techniques: Joint mobilizations were applied to the: R knee for 00 minutes, including:    Patellar mobs in all direction, grade III  Knee extension over pressure  Tibial ER mobs + hyperextension, grade III  Anterior tibial mobs, grade III    Not performed today:  Posterior tibial mobs + IR, grade III    Thao participated in neuromuscular re-education activities to  "improve: Balance, Coordination, Kinesthetic, Sense, and Proprioception for 41 minutes. The following activities were included:    Sideplanks with Clamshells BTB, 3 x 8 each side   Open chain Knee ext on Precor machine, single leg, 35#, 4 x 8 (cueing for eccentric lowering)  Lateral walking with BTB around shins, 10 yards x 4 laps  SL stance on blue foam oval + cone taps, 2 x 10 on each side  SL stance on blue foam oval + Steamboats BTB at ankles, 3 x 5 each LE  SL stance with trampoline Med ball (1 kg) toss, 2 x 10 reps on each LE  SL balance with eyes closed on even surface, 4 x 25 sec   SL balance eyes open on airex foam, 4 x 15 sec each LE (with light UE assist)    Not performed today:  SL bridges on BOSU, 3 x 8 on each side  Hip hikes on the wall with ball, 2 x 10 on each side  Planks with Hip extension Bilateral, 3 x 5 (cueing for form)      Thao participated in dynamic functional therapeutic activities to improve functional performance for 09 minutes, including:    Heel taps from 4" step with 10# KTB hold, 3 x 5 on each side  Sit to stands from chair holding 15# KTB, 3 x 8     Not performed today:  Lunges with foot on furniture slider, 3 x 8 on each side  Step ups on 8" step with 15# KTB, 2 x 10 on each side    Home Exercises Provided and Patient Education Provided     Education provided:   - HEP  - Plan of care  - Early post operative goals    Written Home Exercises Provided: Patient instructed to cont prior HEP.  Exercises were reviewed and Thao was able to demonstrate them prior to the end of the session.  Thao demonstrated good  understanding of the education provided.     See EMR under Patient Instructions for exercises provided  4/1/2024 .    Assessment     Thao presents today and is 5 weeks status post Right knee medial menisectomy. She ambulates pain-free without gait abnormalities and has equalized knee extension and flexion range of motion. Her quad strength has improved greatly since " beginning therapy and she continues to also build on lateral hip control as well. Focus of recent sessions has included emphasized further progressions of open and closed chain strengthening as well as achieving hip coordination to remain out of valgus collapse at knee. Patient has tolerated her return to yoga and work demands well up to this point and is seeing Dr. Drummond on Tuesday morning for her 6 week follow up.     Thao Is progressing well towards her goals.     Pt prognosis is Excellent.     Pt will continue to benefit from skilled outpatient physical therapy to address the deficits listed in the problem list box on initial evaluation, provide pt/family education and to maximize pt's level of independence in the home and community environment.     Pt's spiritual, cultural and educational needs considered and pt agreeable to plan of care and goals.     Anticipated barriers to physical therapy: none    Goals:   Short Term Goals (4 Weeks):   1. Pt will be compliant with HEP to supplement PT in restoring pain free function. (MET)  2. Pt will be able to ambulate during her work day without pain to demonstrate improved overall functional ability. - Met (4/24/24)  3. Pt will improve impaired LE strength by 1/2 MMT grade to improve strength for functional tasks. - Met (4/24/24)  4. Pt improve impaired R knee AROM to 0-130 deg in all planes to improve mobility for normal movement patterns. - Met (4/24/24)    Long Term Goals (8 Weeks):  1. Pt will improve FOTO score to </= 82% to decrease perceived limitation with mobility. (Progressing)  2. Pt will be able to return to yoga 3x per week to demonstrate improved overall functional ability. - Met (4/24/24)  3. Pt will improve impaired LE strength by 1 MMT grade to improve strength for functional tasks. (Progressing)  4. Pt improve impaired R knee AROM to 0-135 deg in all planes to improve mobility for normal movement. - Met (4/24/24)    Plan     Continue to progress  per POC.    Darren Kimball, PT

## 2024-05-06 NOTE — PROGRESS NOTES
S:Thao Ybarra presents for post-operative evaluation.     DATE OF PROCEDURE: 3/28/2024   PROCEDURE PERFORMED:   Right  1. knee arthroscopic partial medial meniscectomy (CPT 52308)  2. knee arthroscopic chondroplasty  3. knee arthroscopic partial synovectomy    Thao Ybarra reports to be doing well 5 weeks 5 days s/p the above mentioned procedure. Going to PT at the [place] location.  States the knee feels much better compared to preop.  Very pleased.  Back to baseline activity day-to-day.    O:  Exam of the right knee demonstrates well-healed arthroscopic portals.  No significant swelling.  No effusion.  Full active extension, flexion to 130°.  No significant joint line tenderness.  Quad activates well.    A/P: Arthroscopic pictures were reviewed with the patient.  Discussed intraoperative findings.  Mild patellar chondromalacia.  I expect her to do well.  May gradually resume all activities to tolerance.  Return to clinic as needed.

## 2024-05-07 ENCOUNTER — OFFICE VISIT (OUTPATIENT)
Dept: SPORTS MEDICINE | Facility: CLINIC | Age: 44
End: 2024-05-07
Payer: COMMERCIAL

## 2024-05-07 VITALS
BODY MASS INDEX: 24.18 KG/M2 | HEIGHT: 72 IN | HEART RATE: 53 BPM | WEIGHT: 178.56 LBS | SYSTOLIC BLOOD PRESSURE: 116 MMHG | DIASTOLIC BLOOD PRESSURE: 75 MMHG

## 2024-05-07 DIAGNOSIS — Z98.890 S/P MENISCECTOMY: Primary | ICD-10-CM

## 2024-05-07 PROCEDURE — 3078F DIAST BP <80 MM HG: CPT | Mod: CPTII,S$GLB,, | Performed by: ORTHOPAEDIC SURGERY

## 2024-05-07 PROCEDURE — 99999 PR PBB SHADOW E&M-EST. PATIENT-LVL III: CPT | Mod: PBBFAC,,, | Performed by: ORTHOPAEDIC SURGERY

## 2024-05-07 PROCEDURE — 3074F SYST BP LT 130 MM HG: CPT | Mod: CPTII,S$GLB,, | Performed by: ORTHOPAEDIC SURGERY

## 2024-05-07 PROCEDURE — 99024 POSTOP FOLLOW-UP VISIT: CPT | Mod: S$GLB,,, | Performed by: ORTHOPAEDIC SURGERY

## 2024-05-07 PROCEDURE — 1159F MED LIST DOCD IN RCRD: CPT | Mod: CPTII,S$GLB,, | Performed by: ORTHOPAEDIC SURGERY

## 2024-05-09 NOTE — PROGRESS NOTES
Physical Therapy Daily Treatment Note / Discharge Note     Name: Thao Ybarra  Clinic Number: [id number]    Therapy Diagnosis:   Encounter Diagnoses   Name Primary?    Gait abnormality Yes    Weakness of right lower extremity     Decreased range of motion of right knee      Physician: Patrice Ching*    Visit Date: 5/10/2024    Physician Orders: PT Eval and Treat   Medical Diagnosis from Referral: Acute internal derangement of right knee [M23.91]   Evaluation Date: 4/1/2024  Authorization Period Expiration: 12/31/2024  Plan of Care Expiration: 5/27/2024  Progress Note Due: 5/24/2024  Date of Surgery: 3/28/2024  Post Op day: 5 weeks, 0 days as of 5/2/24  Visit # / Visits authorized:  8/20  FOTO: 1/ 3    1st FOTO Follow up:   2nd FOTO Follow up:     Time In: 858am  Time Out: 950am  Total Billable Time: 50 minutes    Precautions: Standard and Diabetes    Subjective     Pt reports: that she feels great. She has recovered very well from her surgery and is having no knee pain at this time. She has returned to her previous level of activity.   She was compliant with home exercise program.  Response to previous treatment: decreased pain  Functional change: ongoing     Pain: 0/10  Location: right knee      Objective       Gait: she ambulates independently with a non-antalgic gait pattern and is able to achieve knee extension in midstance      Range of Motion:    Knee Left active Left Passive Right Active R passive   Flexion 140 140 140 140   Extension 0 +5 0 +5 (no pain)     Lower Extremity Strength  Right LE   Left LE     Knee extension: 4+/5 Knee extension: 5/5   Hip extension:  3+/5 Hip extension: 3+/5   Hip abduction: 3+/5  Hip abduction: 4/5      Function:  - Squat: equal weight bearing, good depth, no knee valgus.      Joint Mobility: normalized R patellar mobility and tibiofemoral joint mobility    Treatment     Thao received therapeutic exercises to develop strength, endurance, and ROM for 10 minutes  "including:    Upright bike, 7 minutes, level 4 resistance   SL shuttle leg press, 62#, 3 x 8 on each side    Thao received the following manual therapy techniques: Joint mobilizations were applied to the: R knee for 00 minutes, including:      Thao participated in neuromuscular re-education activities to improve: Balance, Coordination, Kinesthetic, Sense, and Proprioception for 30 minutes. The following activities were included:    Sideplanks with Clamshells BTB, 3 x 8 each side   Open chain Knee ext on Precor machine, single leg, 35#, 4 x 8 (cueing for eccentric lowering)  Lateral walking with BTB around shins, 10 yards x 4 laps  SL stance on blue foam oval + cone taps, 2 x 10 on each side  SL stance on blue foam oval + Steamboats BTB at ankles, 3 x 5 each LE  SL stance with trampoline Med ball (1 kg) toss, 2 x 10 reps on each LE  SL balance with eyes closed on even surface, 4 x 25 sec     Thao participated in dynamic functional therapeutic activities to improve functional performance for 10 minutes, including:    Heel taps from 4" step with 10# KTB hold, 3 x 5 on each side  Sit to stands from chair holding 15# KTB, 3 x 8   Lunges with foot on furniture slider, 3 x 8 on each side    Home Exercises Provided and Patient Education Provided     Education provided:   - HEP  - Plan of care  - Early post operative goals    Written Home Exercises Provided: Patient instructed to cont prior HEP.  Exercises were reviewed and Thao was able to demonstrate them prior to the end of the session.  Thao demonstrated good  understanding of the education provided.     See EMR under Patient Instructions for exercises provided  4/1/2024 .    Assessment     Thao presents today and is 6 weeks status post Right knee medial menisectomy. Since the onset of treatment she has demonstrated continuous improvement in terms of her knee pain and at this time is pain free with all activities. Her ROM has normalized and her strength " has progressed very well. She has been able to tolerated open and closed chain exercises very well and is fully returned to Yoga at this [facility]. Thao will be discharged from skilled PT services due to her progress thus far.     Thao Is progressing well towards her goals.     Pt prognosis is Excellent.     Pt will continue to benefit from skilled outpatient physical therapy to address the deficits listed in the problem list box on initial evaluation, provide pt/family education and to maximize pt's level of independence in the home and community environment.     Pt's spiritual, cultural and educational needs considered and pt agreeable to plan of care and goals.     Anticipated barriers to physical therapy: none    Goals:   Short Term Goals (4 Weeks):   1. Pt will be compliant with HEP to supplement PT in restoring pain free function. (MET)  2. Pt will be able to ambulate during her work day without pain to demonstrate improved overall functional ability. - Met (4/24/24)  3. Pt will improve impaired LE strength by 1/2 MMT grade to improve strength for functional tasks. - Met (4/24/24)  4. Pt improve impaired R knee AROM to 0-130 deg in all planes to improve mobility for normal movement patterns. - Met (4/24/24)    Long Term Goals (8 Weeks):  1. Pt will improve FOTO score to </= 82% to decrease perceived limitation with mobility. (Not fully assessed )  2. Pt will be able to return to yoga 3x per week to demonstrate improved overall functional ability. - Met (4/24/24)  3. Pt will improve impaired LE strength by 1 MMT grade to improve strength for functional tasks. (MET)  4. Pt improve impaired R knee AROM to 0-135 deg in all planes to improve mobility for normal movement. - Met (4/24/24)    Plan     Discharge from skilled PT services.     MARCIE SALAS, PT   Board Certified in Orthopedic Physical Therapy

## 2024-05-10 ENCOUNTER — CLINICAL SUPPORT (OUTPATIENT)
Dept: REHABILITATION | Facility: HOSPITAL | Age: 44
End: 2024-05-10
Payer: COMMERCIAL

## 2024-05-10 DIAGNOSIS — R29.898 WEAKNESS OF RIGHT LOWER EXTREMITY: ICD-10-CM

## 2024-05-10 DIAGNOSIS — R26.9 GAIT ABNORMALITY: Primary | ICD-10-CM

## 2024-05-10 DIAGNOSIS — M25.661 DECREASED RANGE OF MOTION OF RIGHT KNEE: ICD-10-CM

## 2024-05-10 PROCEDURE — 97110 THERAPEUTIC EXERCISES: CPT

## 2024-05-10 PROCEDURE — 97112 NEUROMUSCULAR REEDUCATION: CPT

## 2024-05-10 PROCEDURE — 97530 THERAPEUTIC ACTIVITIES: CPT

## 2024-05-13 PROBLEM — R29.898 WEAKNESS OF RIGHT LOWER EXTREMITY: Status: RESOLVED | Noted: 2024-04-01 | Resolved: 2024-05-13

## 2024-05-13 PROBLEM — M25.661 DECREASED RANGE OF MOTION OF RIGHT KNEE: Status: RESOLVED | Noted: 2024-04-01 | Resolved: 2024-05-13

## 2024-05-13 PROBLEM — R26.9 GAIT ABNORMALITY: Status: RESOLVED | Noted: 2024-04-01 | Resolved: 2024-05-13

## 2024-06-04 ENCOUNTER — OFFICE VISIT (OUTPATIENT)
Dept: ENDOCRINOLOGY | Facility: CLINIC | Age: 44
End: 2024-06-04
Payer: COMMERCIAL

## 2024-06-04 VITALS
SYSTOLIC BLOOD PRESSURE: 107 MMHG | HEIGHT: 72 IN | BODY MASS INDEX: 25.53 KG/M2 | WEIGHT: 188.5 LBS | DIASTOLIC BLOOD PRESSURE: 65 MMHG | HEART RATE: 57 BPM

## 2024-06-04 DIAGNOSIS — Z96.41 INSULIN PUMP STATUS: ICD-10-CM

## 2024-06-04 DIAGNOSIS — E78.2 MIXED HYPERLIPIDEMIA: ICD-10-CM

## 2024-06-04 DIAGNOSIS — E03.9 HYPOTHYROIDISM (ACQUIRED): Primary | ICD-10-CM

## 2024-06-04 DIAGNOSIS — E10.9 CONTROLLED DIABETES MELLITUS TYPE 1 WITHOUT COMPLICATIONS: ICD-10-CM

## 2024-06-04 DIAGNOSIS — E55.9 VITAMIN D DEFICIENCY: ICD-10-CM

## 2024-06-04 DIAGNOSIS — E10.9 TYPE 1 DIABETES MELLITUS WITHOUT COMPLICATION: ICD-10-CM

## 2024-06-04 PROCEDURE — 99999 PR PBB SHADOW E&M-EST. PATIENT-LVL IV: CPT | Mod: PBBFAC,,, | Performed by: INTERNAL MEDICINE

## 2024-06-04 PROCEDURE — 1160F RVW MEDS BY RX/DR IN RCRD: CPT | Mod: CPTII,S$GLB,, | Performed by: INTERNAL MEDICINE

## 2024-06-04 PROCEDURE — 3066F NEPHROPATHY DOC TX: CPT | Mod: CPTII,S$GLB,, | Performed by: INTERNAL MEDICINE

## 2024-06-04 PROCEDURE — 1159F MED LIST DOCD IN RCRD: CPT | Mod: CPTII,S$GLB,, | Performed by: INTERNAL MEDICINE

## 2024-06-04 PROCEDURE — 99214 OFFICE O/P EST MOD 30 MIN: CPT | Mod: S$GLB,,, | Performed by: INTERNAL MEDICINE

## 2024-06-04 PROCEDURE — 3061F NEG MICROALBUMINURIA REV: CPT | Mod: CPTII,S$GLB,, | Performed by: INTERNAL MEDICINE

## 2024-06-04 PROCEDURE — 3008F BODY MASS INDEX DOCD: CPT | Mod: CPTII,S$GLB,, | Performed by: INTERNAL MEDICINE

## 2024-06-04 PROCEDURE — 3044F HG A1C LEVEL LT 7.0%: CPT | Mod: CPTII,S$GLB,, | Performed by: INTERNAL MEDICINE

## 2024-06-04 PROCEDURE — 3074F SYST BP LT 130 MM HG: CPT | Mod: CPTII,S$GLB,, | Performed by: INTERNAL MEDICINE

## 2024-06-04 PROCEDURE — 3078F DIAST BP <80 MM HG: CPT | Mod: CPTII,S$GLB,, | Performed by: INTERNAL MEDICINE

## 2024-06-04 RX ORDER — INSULIN ASPART 100 [IU]/ML
INJECTION, SOLUTION INTRAVENOUS; SUBCUTANEOUS
Qty: 100 ML | Refills: 3 | Status: SHIPPED | OUTPATIENT
Start: 2024-06-04

## 2024-06-04 NOTE — PROGRESS NOTES
ENDOCRINOLOGY CLINIC    Subjective:      Patient ID: Thao Ybarra is referred by Danny Harrington MD     Chief Complaint:  Type 1 diabetes    HPI:   Thao Ybarra is a 43 y.o. female who presents for follow-up of type 1 diabetes.  Patient was seen by Dr. Aponte on 10/16/2023.      Diabetes Hx:  Diagnosed w/ DM:   Complications:   Retinopathy: Denies   Last eye exam: : 12/17/2021  Neuropathy: Denies  Nephropathy: Denies  Cardiovascular: Denies  DKA/HHS: Denies    Severe Hypoglycemia: Denies  Hypoglycemia unawareness: Denies  Glucagon: Has glucagon injection  Hypoglycemic episodes: 1-2 times a week, carries skittles.     Current meds:     Pump: Tslim Control IQ with Dexcom G6       Pump Settings           Using manual boluses usually 1:10  Correction 1:40-50    Total daily insulin 52 units.  Insulin duration 5 hours  Target blood sugar 110    Last insulin pump data available for March 2024.            Compliance with meds: Yes      Diet/Exercise:   Takes 2 snacks and 1 meal daily.   Take bolus for snacks - 1 units, for meal  - 3 units.     Does not take adequate water.   Drinks diet coke  Does yoga, swimming, boxing training with .     Diabetes education:  2 years back.    Last A1c:   Lab Results   Component Value Date    HGBA1C 5.8 (H) 07/06/2023    HGBA1C 5.9 (H) 05/23/2022    HGBA1C 6.2 (H) 02/22/2022     Microalbumin:   Lab Results   Component Value Date    LABMICR 11.0 07/06/2023    CREATRANDUR 281.0 07/06/2023    MICALBCREAT 3.9 07/06/2023    MICALBCREAT 4.0 01/17/2019       Lab Results   Component Value Date    EGFRNORACEVR >60.0 07/06/2023    CREATININE 0.8 07/06/2023     Lipids:   Lab Results   Component Value Date    CHOL 131 07/06/2023    TRIG 34 07/06/2023    HDL 60 07/06/2023    LDLCALC 64.2 07/06/2023    CHOLHDL 45.8 07/06/2023     TSH:  Lab Results   Component Value Date    TSH 0.108 (L) 07/06/2023     Lab Results   Component Value Date    HGB 10.3 (L) 07/06/2023        Aspirin: Yes  Statins:  Yes  ACEI/ARB: No    HTN:  Denies    History recurrent UTI/yeast infections: Denies    Polyuria/Polydipsia: Denies      FHx of DM: Denies  Heart disease: Denies      Hypothyroidism    S/p thyroidectomy in her 20s for goiter and nodules, benign  Current medications: Levothyroxine 137 mcg daily    Cardiovascular disorder:  Denies    Thyroid symptoms:  Clinically euthyroid    Family history of thyroid disease: Mother had hypothyrodiism    Lab Results   Component Value Date    TSH 0.108 (L) 07/06/2023    TSH 2.391 02/22/2022    TSH 1.833 03/22/2021    TSH 1.847 04/22/2020    TSH 2.068 09/19/2019    FREET4 1.13 07/06/2023    FREET4 1.09 03/22/2021    FREET4 1.48 01/09/2019            Vitamin D deficiency    Vitamin-D 36852 IU weekly, not taking consistently    Lab Results   Component Value Date    ZDDMYWMZ00EM 20 (L) 07/06/2023    YNIFVSMT67ET 13 (L) 05/23/2022      ROS: see HPI     Objective:     Physical Exam     /65   Pulse (!) 57   Ht 6' (1.829 m)   Wt 85.5 kg (188 lb 7.9 oz)   BMI 25.56 kg/m²     Wt Readings from Last 3 Encounters:   06/04/24 85.5 kg (188 lb 7.9 oz)   05/07/24 81 kg (178 lb 9.2 oz)   04/11/24 82 kg (180 lb 12.4 oz)       Constitutional:  Pleasant,  in no acute distress.   HENT:   Head:    Normocephalic and atraumatic.   Eyes:    EOMI. No scleral icterus.   Cardiovascular:  Normal rate  Respiratory:   Effort normal   Neurological:  No tremor  Skin:    Skin is warm, dry  Extremity:  No edema      LABORATORY REVIEW:  See HPI for other labs reviewed today      Chemistry        Component Value Date/Time     07/06/2023 0837    K 4.0 07/06/2023 0837     07/06/2023 0837    CO2 24 07/06/2023 0837    BUN 5 (L) 07/06/2023 0837    CREATININE 0.8 07/06/2023 0837     (H) 07/06/2023 0837        Component Value Date/Time    CALCIUM 9.0 07/06/2023 0837    ALKPHOS 72 07/06/2023 0837    AST 24 07/06/2023 0837    ALT 19 07/06/2023 0837    BILITOT 0.4 07/06/2023 0837    ESTGFRAFRICA >60.0  06/14/2022 0846    EGFRNONAA >60.0 06/14/2022 0846          Lab Results   Component Value Date    HGBA1C 5.8 (H) 07/06/2023    HGBA1C 5.9 (H) 05/23/2022    HGBA1C 6.2 (H) 02/22/2022     Other labs reviewed today in HPI    Assessment/Plan:     Problem List Items Addressed This Visit          Cardiac/Vascular    Mixed hyperlipidemia       Continue statins.  Monitor lipids.              Endocrine    Controlled diabetes mellitus type 1 without complications         Last A1c was 5.8.  Repeat A1c.  Continue current insulin pump settings.      Continue good diet and lifestyle modifications for diabetes management  Has glucagon injection.  Carry glucose tablets or snacks at all times.     Complications:  Follow up for regular diabetes eye exam  Daily self examination of feet.  Microalbumin: Monitor.               Relevant Medications    insulin aspart U-100 (NOVOLOG U-100 INSULIN ASPART) 100 unit/mL injection    Hypothyroidism (acquired) - Primary       Currently on levothyroxine 137 mcg daily.  Clinically euthyroid.  TSH has been low with normal free T4.  Discussed keeping her TSH at goal and adjust levothyroxine dose.  Monitor thyroid function tests.           Insulin pump status     As above         Vitamin D deficiency       Continue vitamin-D supplements.  Monitor periodically            Other Visit Diagnoses       Type 1 diabetes mellitus without complication        Relevant Medications    insulin aspart U-100 (NOVOLOG U-100 INSULIN ASPART) 100 unit/mL injection           Follow-up in 6 months.    Kelley Dobbs MD

## 2024-06-05 ENCOUNTER — LAB VISIT (OUTPATIENT)
Dept: LAB | Facility: HOSPITAL | Age: 44
End: 2024-06-05
Payer: COMMERCIAL

## 2024-06-05 DIAGNOSIS — E03.9 HYPOTHYROIDISM (ACQUIRED): ICD-10-CM

## 2024-06-05 DIAGNOSIS — E10.9 CONTROLLED DIABETES MELLITUS TYPE 1 WITHOUT COMPLICATIONS: ICD-10-CM

## 2024-06-05 DIAGNOSIS — E78.2 MIXED HYPERLIPIDEMIA: ICD-10-CM

## 2024-06-05 DIAGNOSIS — E55.9 VITAMIN D DEFICIENCY: ICD-10-CM

## 2024-06-05 LAB
25(OH)D3+25(OH)D2 SERPL-MCNC: 17 NG/ML (ref 30–96)
ALBUMIN SERPL BCP-MCNC: 3.6 G/DL (ref 3.5–5.2)
ALBUMIN/CREAT UR: 9 UG/MG (ref 0–30)
ALP SERPL-CCNC: 62 U/L (ref 55–135)
ALT SERPL W/O P-5'-P-CCNC: 24 U/L (ref 10–44)
ANION GAP SERPL CALC-SCNC: 5 MMOL/L (ref 8–16)
AST SERPL-CCNC: 30 U/L (ref 10–40)
BILIRUB SERPL-MCNC: 0.4 MG/DL (ref 0.1–1)
BUN SERPL-MCNC: 7 MG/DL (ref 6–20)
CALCIUM SERPL-MCNC: 9.5 MG/DL (ref 8.7–10.5)
CHLORIDE SERPL-SCNC: 106 MMOL/L (ref 95–110)
CHOLEST SERPL-MCNC: 151 MG/DL (ref 120–199)
CHOLEST/HDLC SERPL: 2.3 {RATIO} (ref 2–5)
CO2 SERPL-SCNC: 28 MMOL/L (ref 23–29)
CREAT SERPL-MCNC: 0.8 MG/DL (ref 0.5–1.4)
CREAT UR-MCNC: 67 MG/DL (ref 15–325)
EST. GFR  (NO RACE VARIABLE): >60 ML/MIN/1.73 M^2
ESTIMATED AVG GLUCOSE: 128 MG/DL (ref 68–131)
GLUCOSE SERPL-MCNC: 100 MG/DL (ref 70–110)
HBA1C MFR BLD: 6.1 % (ref 4–5.6)
HDLC SERPL-MCNC: 67 MG/DL (ref 40–75)
HDLC SERPL: 44.4 % (ref 20–50)
LDLC SERPL CALC-MCNC: 73.6 MG/DL (ref 63–159)
MICROALBUMIN UR DL<=1MG/L-MCNC: 6 UG/ML
NONHDLC SERPL-MCNC: 84 MG/DL
POTASSIUM SERPL-SCNC: 4.7 MMOL/L (ref 3.5–5.1)
PROT SERPL-MCNC: 6.1 G/DL (ref 6–8.4)
SODIUM SERPL-SCNC: 139 MMOL/L (ref 136–145)
T4 FREE SERPL-MCNC: 0.91 NG/DL (ref 0.71–1.51)
TRIGL SERPL-MCNC: 52 MG/DL (ref 30–150)
TSH SERPL DL<=0.005 MIU/L-ACNC: 0.05 UIU/ML (ref 0.4–4)

## 2024-06-05 PROCEDURE — 83036 HEMOGLOBIN GLYCOSYLATED A1C: CPT | Performed by: INTERNAL MEDICINE

## 2024-06-05 PROCEDURE — 84443 ASSAY THYROID STIM HORMONE: CPT | Performed by: INTERNAL MEDICINE

## 2024-06-05 PROCEDURE — 36415 COLL VENOUS BLD VENIPUNCTURE: CPT | Mod: PN | Performed by: INTERNAL MEDICINE

## 2024-06-05 PROCEDURE — 82306 VITAMIN D 25 HYDROXY: CPT | Performed by: INTERNAL MEDICINE

## 2024-06-05 PROCEDURE — 82043 UR ALBUMIN QUANTITATIVE: CPT | Performed by: INTERNAL MEDICINE

## 2024-06-05 PROCEDURE — 80053 COMPREHEN METABOLIC PANEL: CPT | Performed by: INTERNAL MEDICINE

## 2024-06-05 PROCEDURE — 84439 ASSAY OF FREE THYROXINE: CPT | Performed by: INTERNAL MEDICINE

## 2024-06-05 PROCEDURE — 80061 LIPID PANEL: CPT | Performed by: INTERNAL MEDICINE

## 2024-06-06 RX ORDER — LEVOTHYROXINE SODIUM 137 UG/1
137 TABLET ORAL
Qty: 90 TABLET | Refills: 3 | Status: SHIPPED | OUTPATIENT
Start: 2024-06-06

## 2024-06-06 RX ORDER — ERGOCALCIFEROL 1.25 MG/1
50000 CAPSULE ORAL
Qty: 12 CAPSULE | Refills: 1 | Status: SHIPPED | OUTPATIENT
Start: 2024-06-06

## 2024-06-06 RX ORDER — BLOOD-GLUCOSE SENSOR
1 EACH MISCELLANEOUS
Qty: 9 EACH | Refills: 3 | Status: SHIPPED | OUTPATIENT
Start: 2024-06-06 | End: 2025-06-06

## 2024-06-13 ENCOUNTER — OFFICE VISIT (OUTPATIENT)
Dept: OPTOMETRY | Facility: CLINIC | Age: 44
End: 2024-06-13
Payer: COMMERCIAL

## 2024-06-13 DIAGNOSIS — H52.203 MYOPIA OF BOTH EYES WITH ASTIGMATISM: ICD-10-CM

## 2024-06-13 DIAGNOSIS — H52.13 MYOPIA OF BOTH EYES WITH ASTIGMATISM: ICD-10-CM

## 2024-06-13 DIAGNOSIS — Z01.00 EYE EXAM, ROUTINE: Primary | ICD-10-CM

## 2024-06-13 PROCEDURE — 99999 PR PBB SHADOW E&M-EST. PATIENT-LVL III: CPT | Mod: PBBFAC,,, | Performed by: OPTOMETRIST

## 2024-06-13 PROCEDURE — 3044F HG A1C LEVEL LT 7.0%: CPT | Mod: CPTII,S$GLB,, | Performed by: OPTOMETRIST

## 2024-06-13 PROCEDURE — 92015 DETERMINE REFRACTIVE STATE: CPT | Mod: S$GLB,,, | Performed by: OPTOMETRIST

## 2024-06-13 PROCEDURE — 3066F NEPHROPATHY DOC TX: CPT | Mod: CPTII,S$GLB,, | Performed by: OPTOMETRIST

## 2024-06-13 PROCEDURE — 3061F NEG MICROALBUMINURIA REV: CPT | Mod: CPTII,S$GLB,, | Performed by: OPTOMETRIST

## 2024-06-13 PROCEDURE — 2023F DILAT RTA XM W/O RTNOPTHY: CPT | Mod: CPTII,S$GLB,, | Performed by: OPTOMETRIST

## 2024-06-13 PROCEDURE — 92014 COMPRE OPH EXAM EST PT 1/>: CPT | Mod: S$GLB,,, | Performed by: OPTOMETRIST

## 2024-06-13 PROCEDURE — 1159F MED LIST DOCD IN RCRD: CPT | Mod: CPTII,S$GLB,, | Performed by: OPTOMETRIST

## 2024-06-13 NOTE — PROGRESS NOTES
HPI    Patient is here today for a diabetic eye exam. Last seen on 12/17/2021   with Dr. Milan. States there has been a decrease in her VA at a distance.   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 No pain or   discomfort. Longstanding floater in OD.    Eye Meds: None  Past Ocular Sx: None    Hemoglobin A1C       Date                     Value               Ref Range             Status                06/05/2024               6.1 (H)             4.0 - 5.6 %           Final                 07/06/2023               5.8 (H)             4.0 - 5.6 %           Final                 05/23/2022               5.9 (H)             4.0 - 5.6 %           Final            Last edited by Jim Mcdonald, OD on 6/13/2024 10:03 AM.            Assessment /Plan     For exam results, see Encounter Report.    Eye exam, routine  -No retinopathy noted today.  Continued control with primary care physician and annual comprehensive eye exam.  -Eyemed    Myopia of both eyes with astigmatism  Eyeglass Final Rx       Eyeglass Final Rx         Sphere Cylinder [place] Dist [facility]    Right -1.25 +1.00 005 20/20    Left -1.00 +1.25 180 20/20      Type: SVL    Expiration Date: 6/13/2025                      RTC 1 yr

## 2024-06-17 ENCOUNTER — OFFICE VISIT (OUTPATIENT)
Dept: PRIMARY CARE CLINIC | Facility: CLINIC | Age: 44
End: 2024-06-17
Payer: COMMERCIAL

## 2024-06-17 VITALS
SYSTOLIC BLOOD PRESSURE: 122 MMHG | RESPIRATION RATE: 18 BRPM | BODY MASS INDEX: 25.12 KG/M2 | DIASTOLIC BLOOD PRESSURE: 70 MMHG | HEART RATE: 46 BPM | HEIGHT: 72 IN | WEIGHT: 185.44 LBS | OXYGEN SATURATION: 99 %

## 2024-06-17 DIAGNOSIS — E78.2 MIXED HYPERLIPIDEMIA: ICD-10-CM

## 2024-06-17 DIAGNOSIS — F41.1 GENERALIZED ANXIETY DISORDER: Primary | ICD-10-CM

## 2024-06-17 DIAGNOSIS — Z56.6 STRESSFUL JOB: ICD-10-CM

## 2024-06-17 PROCEDURE — G2211 COMPLEX E/M VISIT ADD ON: HCPCS | Mod: S$GLB,,, | Performed by: FAMILY MEDICINE

## 2024-06-17 PROCEDURE — 1159F MED LIST DOCD IN RCRD: CPT | Mod: CPTII,S$GLB,, | Performed by: FAMILY MEDICINE

## 2024-06-17 PROCEDURE — 3044F HG A1C LEVEL LT 7.0%: CPT | Mod: CPTII,S$GLB,, | Performed by: FAMILY MEDICINE

## 2024-06-17 PROCEDURE — 3008F BODY MASS INDEX DOCD: CPT | Mod: CPTII,S$GLB,, | Performed by: FAMILY MEDICINE

## 2024-06-17 PROCEDURE — 3078F DIAST BP <80 MM HG: CPT | Mod: CPTII,S$GLB,, | Performed by: FAMILY MEDICINE

## 2024-06-17 PROCEDURE — 1160F RVW MEDS BY RX/DR IN RCRD: CPT | Mod: CPTII,S$GLB,, | Performed by: FAMILY MEDICINE

## 2024-06-17 PROCEDURE — 99214 OFFICE O/P EST MOD 30 MIN: CPT | Mod: S$GLB,,, | Performed by: FAMILY MEDICINE

## 2024-06-17 PROCEDURE — 3061F NEG MICROALBUMINURIA REV: CPT | Mod: CPTII,S$GLB,, | Performed by: FAMILY MEDICINE

## 2024-06-17 PROCEDURE — 99999 PR PBB SHADOW E&M-EST. PATIENT-LVL III: CPT | Mod: PBBFAC,,, | Performed by: FAMILY MEDICINE

## 2024-06-17 PROCEDURE — 3074F SYST BP LT 130 MM HG: CPT | Mod: CPTII,S$GLB,, | Performed by: FAMILY MEDICINE

## 2024-06-17 PROCEDURE — 3066F NEPHROPATHY DOC TX: CPT | Mod: CPTII,S$GLB,, | Performed by: FAMILY MEDICINE

## 2024-06-17 RX ORDER — ROSUVASTATIN CALCIUM 5 MG/1
5 TABLET, COATED ORAL DAILY
Qty: 90 TABLET | Refills: 3 | Status: SHIPPED | OUTPATIENT
Start: 2024-06-17

## 2024-06-17 RX ORDER — SERTRALINE HYDROCHLORIDE 100 MG/1
100 TABLET, FILM COATED ORAL DAILY
Qty: 90 TABLET | Refills: 3 | Status: SHIPPED | OUTPATIENT
Start: 2024-06-17

## 2024-06-17 SDOH — SOCIAL DETERMINANTS OF HEALTH (SDOH): OTHER PHYSICAL AND MENTAL STRAIN RELATED TO WORK: Z56.6

## 2024-06-17 NOTE — ASSESSMENT & PLAN NOTE
Last A1c was 5.8.  Repeat A1c.  Continue current insulin pump settings.      Continue good diet and lifestyle modifications for diabetes management  Has glucagon injection.  Carry glucose tablets or snacks at all times.     Complications:  Follow up for regular diabetes eye exam  Daily self examination of feet.  Microalbumin: Monitor.

## 2024-06-17 NOTE — PROGRESS NOTES
/70 (BP Location: Right arm, Patient Position: Sitting, BP Method: Medium (Manual))   Pulse (!) 46   Resp 18   Ht 6' (1.829 m)   Wt 84.1 kg (185 lb 6.5 oz)   LMP 06/15/2024   SpO2 99%   BMI 25.15 kg/m²       ===========          HPI    Thao Ybarra is a 43 y.o. female     here for    Stress related to loss of work.   She is having increased stress related to above  She is a neurologist and specializes in epilepsy.    She is seeking further employment.  She would like to increase her sertraline to 100 mg daily.    No suicidal homicidal ideations.    Patient queried and denies any further complaints      Patient Active Problem List   Diagnosis    Controlled diabetes mellitus type 1 without complications    Hypothyroidism (acquired)    Insulin pump status    Vitamin D deficiency    Mixed hyperlipidemia    Generalized anxiety disorder    Stressful job       SURGICAL AND MEDICAL HISTORY: updated and reviewed.  Past Surgical History:   Procedure Laterality Date    ARTHROSCOPIC CHONDROPLASTY OF KNEE JOINT Right 3/28/2024    Procedure: ARTHROSCOPY, KNEE, WITH CHONDROPLASTY;  Surgeon: RUPESH Drummond MD;  Location: [facility] OR;  Service: Orthopedics;  Laterality: Right;     SECTION      x2    COLONOSCOPY N/A 2022    Procedure: COLONOSCOPY;  Surgeon: Conner García MD;  Location: [facility] ([address] [place] [address]);  Service: Endoscopy;  Laterality: N/A;   fully vaccinated; instructions to portal-st    KNEE ARTHROSCOPY W/ MENISCECTOMY Right 3/28/2024    Procedure: ARTHROSCOPY, KNEE, WITH MEDIAL MENISCECTOMY;  Surgeon: RUPESH Drummond MD;  Location: [facility] OR;  Service: Orthopedics;  Laterality: Right;  0.2% Ropivacaine    SYNOVECTOMY OF KNEE Right 3/28/2024    Procedure: SYNOVECTOMY, KNEE;  Surgeon: RUPESH Drummond MD;  Location: [facility] OR;  Service: Orthopedics;  Laterality: Right;     ALLERGIES updated and reviewed.  Review of patient's allergies indicates:  No Known Allergies    CURRENT OUTPATIENT  MEDICATIONS updated and reviewed    Current Outpatient Medications:     blood sugar diagnostic Strp, To check BG 4 times daily, to use with insurance preferred meter., Disp: 200 strip, Rfl: 3    blood-glucose meter (CONTOUR LINK MISC), by Misc.(Non-Drug; Combo Route) route., Disp: , Rfl:     blood-glucose sensor (DEXCOM G6 SENSOR) Crystal, 1 Device by Misc.(Non-Drug; Combo Route) route every 10 days., Disp: 9 each, Rfl: 3    blood-glucose transmitter (DEXCOM G6 TRANSMITTER) Crystal, 1 Device by Misc.(Non-Drug; Combo Route) route every 3 (three) months., Disp: 1 each, Rfl: 3    ergocalciferol (ERGOCALCIFEROL) 50,000 unit Cap, Take 1 capsule (50,000 Units total) by mouth every 7 days. For 8 weeks then take over-the-counter vitamin-D 5000 IU daily, Disp: 12 capsule, Rfl: 1    glucagon (GVOKE HYPOPEN 2-PACK) 0.5 mg/0.1 mL AtIn, Inject 1 each into the skin as needed (hypoglycemia)., Disp: 0.2 mL, Rfl: 3    insulin (LANTUS SOLOSTAR U-100 INSULIN) glargine 100 units/mL (3mL) SubQ pen, Inject 32 Units into the skin once daily. In case insulin pump fails, Disp: 3 mL, Rfl: 1    insulin aspart U-100 (NOVOLOG U-100 INSULIN ASPART) 100 unit/mL injection, For use in insulin pump, max Total Daily Dose 100 units per day, Disp: 100 mL, Rfl: 3    levothyroxine (SYNTHROID) 137 MCG Tab tablet, Take 1 tablet (137 mcg total) by mouth before breakfast., Disp: 90 tablet, Rfl: 3    rosuvastatin (CRESTOR) 5 MG tablet, Take 1 tablet (5 mg total) by mouth once daily., Disp: 90 tablet, Rfl: 3    sertraline (ZOLOFT) 100 MG tablet, Take 1 tablet (100 mg total) by mouth once daily., Disp: 90 tablet, Rfl: 3    Review of Systems   Constitutional:  Negative for activity change, appetite change, chills, diaphoresis, fatigue, fever and unexpected weight change.   HENT:  Negative for congestion, ear discharge, ear pain, facial swelling, hearing loss, nosebleeds, postnasal drip, rhinorrhea, sinus pressure, sneezing, sore throat, tinnitus, trouble swallowing  and voice change.    Eyes:  Negative for photophobia, pain, discharge, redness, itching and visual disturbance.   Respiratory:  Negative for cough, chest tightness, shortness of breath and wheezing.    Cardiovascular:  Negative for chest pain, palpitations and leg swelling.   Gastrointestinal:  Negative for abdominal distention, abdominal pain, anal bleeding, blood in stool, constipation, diarrhea, nausea, rectal pain and vomiting.   Endocrine: Negative for cold intolerance, heat intolerance, polydipsia, polyphagia and polyuria.   Genitourinary:  Negative for difficulty urinating, dysuria and flank pain.   Musculoskeletal:  Negative for arthralgias, back pain, joint swelling, myalgias and neck pain.   Skin:  Negative for rash.   Neurological:  Negative for dizziness, tremors, seizures, syncope, speech difficulty, weakness, light-headedness, numbness and headaches.   Psychiatric/Behavioral:  Negative for behavioral problems, confusion, decreased concentration, dysphoric mood, sleep disturbance and suicidal ideas. The patient is not nervous/anxious and is not hyperactive.        /70 (BP Location: Right arm, Patient Position: Sitting, BP Method: Medium (Manual))   Pulse (!) 46   Resp 18   Ht 6' (1.829 m)   Wt 84.1 kg (185 lb 6.5 oz)   LMP 06/15/2024   SpO2 99%   BMI 25.15 kg/m²   Physical Exam  Constitutional:       General: She is not in acute distress.     Appearance: Normal appearance. She is not ill-appearing.   Neurological:      Mental Status: She is alert.   Psychiatric:         Mood and Affect: Mood normal.         Behavior: Behavior normal.         ASSESSMENT/PLAN    1. Generalized anxiety disorder  Increase sertraline to 100 mg daily.  Refills given.  See below.  2. Stressful job  Consider psychotherapy if stress seems to continue to increase.  Maybe improved, hopefully, with new position.  3. Mixed hyperlipidemia  Continue rosuvastatin.  Needs refill.  See below.      Other orders  -      sertraline (ZOLOFT) 100 MG tablet; Take 1 tablet (100 mg total) by mouth once daily.  Dispense: 90 tablet; Refill: 3  -     rosuvastatin (CRESTOR) 5 MG tablet; Take 1 tablet (5 mg total) by mouth once daily.  Dispense: 90 tablet; Refill: 3              Most recent some lab results reviewed with patient.  Any new prescription medications gone over in detail including reason for taking the medication, most common possible side effects and possible costs, etcetera.    Chronic conditions updated. Other than changes or additions as above, cont current medications and maintain follow-up with specialists if indicated.     Danny Harrington MD  A dictation device was used to produce this document. Use of such devices sometimes results in grammatical errors or replacement of words that sound similarly.

## 2024-06-17 NOTE — ASSESSMENT & PLAN NOTE
Currently on levothyroxine 137 mcg daily.  Clinically euthyroid.  TSH has been low with normal free T4.  Discussed keeping her TSH at goal and adjust levothyroxine dose.  Monitor thyroid function tests.

## 2024-06-19 PROBLEM — F41.1 GENERALIZED ANXIETY DISORDER: Status: ACTIVE | Noted: 2024-06-19

## 2024-06-19 PROBLEM — Z56.6 STRESSFUL JOB: Status: ACTIVE | Noted: 2024-06-19

## 2024-10-23 ENCOUNTER — OFFICE VISIT (OUTPATIENT)
Dept: PRIMARY CARE CLINIC | Facility: CLINIC | Age: 44
End: 2024-10-23
Payer: COMMERCIAL

## 2024-10-23 VITALS
HEART RATE: 69 BPM | SYSTOLIC BLOOD PRESSURE: 124 MMHG | RESPIRATION RATE: 18 BRPM | OXYGEN SATURATION: 97 % | BODY MASS INDEX: 26.76 KG/M2 | HEIGHT: 72 IN | WEIGHT: 197.56 LBS | DIASTOLIC BLOOD PRESSURE: 72 MMHG

## 2024-10-23 DIAGNOSIS — Z02.1 PHYSICAL EXAM, PRE-EMPLOYMENT: Primary | ICD-10-CM

## 2024-10-23 PROCEDURE — 3074F SYST BP LT 130 MM HG: CPT | Mod: CPTII,S$GLB,, | Performed by: FAMILY MEDICINE

## 2024-10-23 PROCEDURE — 1160F RVW MEDS BY RX/DR IN RCRD: CPT | Mod: CPTII,S$GLB,, | Performed by: FAMILY MEDICINE

## 2024-10-23 PROCEDURE — 3008F BODY MASS INDEX DOCD: CPT | Mod: CPTII,S$GLB,, | Performed by: FAMILY MEDICINE

## 2024-10-23 PROCEDURE — 3044F HG A1C LEVEL LT 7.0%: CPT | Mod: CPTII,S$GLB,, | Performed by: FAMILY MEDICINE

## 2024-10-23 PROCEDURE — 99213 OFFICE O/P EST LOW 20 MIN: CPT | Mod: S$GLB,,, | Performed by: FAMILY MEDICINE

## 2024-10-23 PROCEDURE — 99999 PR PBB SHADOW E&M-EST. PATIENT-LVL IV: CPT | Mod: PBBFAC,,, | Performed by: FAMILY MEDICINE

## 2024-10-23 PROCEDURE — 3078F DIAST BP <80 MM HG: CPT | Mod: CPTII,S$GLB,, | Performed by: FAMILY MEDICINE

## 2024-10-23 PROCEDURE — 3061F NEG MICROALBUMINURIA REV: CPT | Mod: CPTII,S$GLB,, | Performed by: FAMILY MEDICINE

## 2024-10-23 PROCEDURE — 1159F MED LIST DOCD IN RCRD: CPT | Mod: CPTII,S$GLB,, | Performed by: FAMILY MEDICINE

## 2024-10-23 PROCEDURE — 3066F NEPHROPATHY DOC TX: CPT | Mod: CPTII,S$GLB,, | Performed by: FAMILY MEDICINE

## 2024-11-02 DIAGNOSIS — E10.9 CONTROLLED DIABETES MELLITUS TYPE 1 WITHOUT COMPLICATIONS: ICD-10-CM

## 2024-11-04 RX ORDER — BLOOD-GLUCOSE TRANSMITTER
1 EACH MISCELLANEOUS
Qty: 1 EACH | Refills: 3 | Status: SHIPPED | OUTPATIENT
Start: 2024-11-04 | End: 2025-11-04

## 2024-12-05 ENCOUNTER — PATIENT MESSAGE (OUTPATIENT)
Dept: ENDOCRINOLOGY | Facility: CLINIC | Age: 44
End: 2024-12-05
Payer: COMMERCIAL

## 2024-12-09 ENCOUNTER — OFFICE VISIT (OUTPATIENT)
Dept: ENDOCRINOLOGY | Facility: CLINIC | Age: 44
End: 2024-12-09
Payer: COMMERCIAL

## 2024-12-09 DIAGNOSIS — Z96.41 INSULIN PUMP STATUS: ICD-10-CM

## 2024-12-09 DIAGNOSIS — E55.9 VITAMIN D DEFICIENCY: ICD-10-CM

## 2024-12-09 DIAGNOSIS — E78.2 MIXED HYPERLIPIDEMIA: ICD-10-CM

## 2024-12-09 DIAGNOSIS — E10.9 CONTROLLED DIABETES MELLITUS TYPE 1 WITHOUT COMPLICATIONS: Primary | ICD-10-CM

## 2024-12-09 DIAGNOSIS — E03.9 HYPOTHYROIDISM (ACQUIRED): ICD-10-CM

## 2024-12-09 PROCEDURE — 1159F MED LIST DOCD IN RCRD: CPT | Mod: CPTII,95,, | Performed by: INTERNAL MEDICINE

## 2024-12-09 PROCEDURE — 3044F HG A1C LEVEL LT 7.0%: CPT | Mod: CPTII,95,, | Performed by: INTERNAL MEDICINE

## 2024-12-09 PROCEDURE — 3066F NEPHROPATHY DOC TX: CPT | Mod: CPTII,95,, | Performed by: INTERNAL MEDICINE

## 2024-12-09 PROCEDURE — 1160F RVW MEDS BY RX/DR IN RCRD: CPT | Mod: CPTII,95,, | Performed by: INTERNAL MEDICINE

## 2024-12-09 PROCEDURE — 99214 OFFICE O/P EST MOD 30 MIN: CPT | Mod: 95,,, | Performed by: INTERNAL MEDICINE

## 2024-12-09 PROCEDURE — 3061F NEG MICROALBUMINURIA REV: CPT | Mod: CPTII,95,, | Performed by: INTERNAL MEDICINE

## 2024-12-09 RX ORDER — BLOOD-GLUCOSE SENSOR
1 EACH MISCELLANEOUS
Qty: 9 EACH | Refills: 3 | Status: SHIPPED | OUTPATIENT
Start: 2024-12-09 | End: 2025-12-09

## 2024-12-09 RX ORDER — INSULIN LISPRO-AABC 100 [IU]/ML
INJECTION, SOLUTION INTRAVENOUS; SUBCUTANEOUS
Qty: 100 ML | Refills: 3 | Status: SHIPPED | OUTPATIENT
Start: 2024-12-09

## 2024-12-09 RX ORDER — BLOOD-GLUCOSE TRANSMITTER
1 EACH MISCELLANEOUS
Qty: 1 EACH | Refills: 3 | Status: SHIPPED | OUTPATIENT
Start: 2024-12-09 | End: 2025-12-09

## 2024-12-09 RX ORDER — LEVOTHYROXINE SODIUM 137 UG/1
137 TABLET ORAL
Qty: 90 TABLET | Refills: 3 | Status: SHIPPED | OUTPATIENT
Start: 2024-12-09

## 2024-12-09 NOTE — ASSESSMENT & PLAN NOTE
Currently on levothyroxine 137 mcg daily.  Clinically euthyroid.  TSH has been low with normal free T4.  Discussed keeping her TSH at goal.  Monitor thyroid function test.

## 2024-12-09 NOTE — ASSESSMENT & PLAN NOTE
Last A1c was 6.1.  Repeat A1c.  Continue current insulin pump settings.    Unable to review her insulin pump/Dexcom data today.    Continue good diet and lifestyle modifications for diabetes management  Has glucagon injection.  Carry glucagon, glucose tablets or snacks at all times.     Complications:  Follow up for regular diabetes eye exam  Daily self examination of feet.  Microalbumin: Monitor.     Lab Results   Component Value Date    HGBA1C 6.1 (H) 06/05/2024

## 2024-12-09 NOTE — PROGRESS NOTES
ENDOCRINOLOGY CLINIC    The patient location is: Home    Visit type: audiovisual    Face to Face time with patient: 15 mins  25 minutes of total time spent on the encounter, which includes face to face time and non-face to face time preparing to see the patient (eg, review of tests), Obtaining and/or reviewing separately obtained history, Documenting clinical information in the electronic or other health record, Independently interpreting results (not separately reported) and communicating results to the patient/family/caregiver, or Care coordination (not separately reported).     Each patient to whom he or she provides medical services by telemedicine is:  (1) informed of the relationship between the physician and patient and the respective role of any other health care provider with respect to management of the patient; and (2) notified that he or she may decline to receive medical services by telemedicine and may withdraw from such care at any time.    Notes:      Subjective:        Chief Complaint:  Type 1 diabetes    HPI:   Thao Ybarra is a 44 y.o. female who presents for follow-up of type 1 diabetes.  Patient was seen in the clinic on 06/04/2024.      Diabetes Hx:  Diagnosed w/ DM:   Complications:   Retinopathy: Denies   Last eye exam: : 06/13/2024  Neuropathy: Denies  Nephropathy: Denies  Cardiovascular: Denies  DKA/HHS: Denies    Severe Hypoglycemia: Denies  Hypoglycemia unawareness: Denies  Glucagon: Has glucagon injection  Hypoglycemic episodes: 1-2 times a week, carries skittles.     Current meds:     Pump: Tslim Control IQ with Dexcom G6       Pump Settings     Her most recent insulin pump/Dexcom data not available website.  Patient says she updated then this morning    Prior insulin pump data:  Using manual boluses usually 1:10  Correction 1:40-50    Total daily insulin 52 units.  Insulin duration 5 hours  Target blood sugar 110    Patient says she did adjust her basal rates slightly.  Denies any  significant hypoglycemia recently.    Uses [facility] for insulin pump supply.       Compliance with meds: Yes    Diet/Exercise:   Takes 3 big snacks a day.   Takes bolus for snacks - 1 units, for meal  - 3 units.     Taking adequate water  Quit diet coke  Does yoga 3 times/week, swimming, boxing training with .     Diabetes education:  2 years back.    Last A1c:   Lab Results   Component Value Date    HGBA1C 6.1 (H) 06/05/2024    HGBA1C 5.8 (H) 07/06/2023    HGBA1C 5.9 (H) 05/23/2022     Microalbumin:   Lab Results   Component Value Date    LABMICR 6.0 06/05/2024    CREATRANDUR 67.0 06/05/2024    MICALBCREAT 9.0 06/05/2024    MICALBCREAT 3.9 07/06/2023     Lab Results   Component Value Date    EGFRNORACEVR >60.0 06/05/2024    CREATININE 0.8 06/05/2024     Lipids:   Lab Results   Component Value Date    CHOL 151 06/05/2024    TRIG 52 06/05/2024    HDL 67 06/05/2024    LDLCALC 73.6 06/05/2024    CHOLHDL 44.4 06/05/2024     TSH:  Lab Results   Component Value Date    TSH 0.050 (L) 06/05/2024     Lab Results   Component Value Date    HGB 10.3 (L) 07/06/2023        Aspirin: Yes  Statins: Yes  ACEI/ARB: No    HTN:  Denies    History recurrent UTI/yeast infections: Denies    Polyuria/Polydipsia: Denies      FHx of DM: Denies  Heart disease: Denies      Hypothyroidism    S/p thyroidectomy in her 20s for goiter and nodules, benign  Current medications: Levothyroxine 137 mcg daily    Cardiovascular disorder:  Denies    Thyroid symptoms:  Clinically euthyroid    Family history of thyroid disease: Mother had hypothyroidism.    Lab Results   Component Value Date    TSH 0.050 (L) 06/05/2024    TSH 0.108 (L) 07/06/2023    TSH 2.391 02/22/2022    TSH 1.833 03/22/2021    TSH 1.847 04/22/2020    FREET4 0.91 06/05/2024    FREET4 1.13 07/06/2023    FREET4 1.09 03/22/2021    FREET4 1.48 01/09/2019            Vitamin D deficiency    Vitamin-D 31502 IU weekly, takes it when she remembers - 2 times a month on average.     Lab Results    Component Value Date    [id number] 17 (L) 06/05/2024    [id number] 20 (L) 07/06/2023      ROS: see HPI     Objective:     Physical Exam     There were no vitals taken for this visit.    Wt Readings from Last 3 Encounters:   10/23/24 89.6 kg (197 lb 8.5 oz)   06/17/24 84.1 kg (185 lb 6.5 oz)   06/04/24 85.5 kg (188 lb 7.9 oz)       Constitutional:  Pleasant,  in no acute distress.   HENT:   Eyes:    No scleral icterus.   Respiratory:   Effort normal   Neurological:  Normal speech      LABORATORY REVIEW:  See HPI for other labs reviewed today      Chemistry        Component Value Date/Time     06/05/2024 0940    K 4.7 06/05/2024 0940     06/05/2024 0940    CO2 28 06/05/2024 0940    BUN 7 06/05/2024 0940    CREATININE 0.8 06/05/2024 0940     06/05/2024 0940        Component Value Date/Time    CALCIUM 9.5 06/05/2024 0940    ALKPHOS 62 06/05/2024 0940    AST 30 06/05/2024 0940    ALT 24 06/05/2024 0940    BILITOT 0.4 06/05/2024 0940    ESTGFRAFRICA >60.0 06/14/2022 0846    EGFRNONAA >60.0 06/14/2022 0846          Lab Results   Component Value Date    HGBA1C 6.1 (H) 06/05/2024    HGBA1C 5.8 (H) 07/06/2023    HGBA1C 5.9 (H) 05/23/2022     Other labs reviewed today in HPI    Assessment/Plan:     1. Controlled diabetes mellitus type 1 without complications  Assessment & Plan:      Last A1c was 6.1.  Repeat A1c.  Continue current insulin pump settings.    Unable to review her insulin pump/Dexcom data today.    Continue good diet and lifestyle modifications for diabetes management  Has glucagon injection.  Carry glucagon, glucose tablets or snacks at all times.     Complications:  Follow up for regular diabetes eye exam  Daily self examination of feet.  Microalbumin: Monitor.     Lab Results   Component Value Date    HGBA1C 6.1 (H) 06/05/2024            Orders:  -     blood-glucose sensor (DEXCOM G6 SENSOR) Crystal; 1 Device by Misc.(Non-Drug; Combo Route) route every 10 days.  Dispense: 9 each; Refill:  3  -     blood-glucose transmitter (DEXCOM G6 TRANSMITTER) Crystal; 1 Device by Misc.(Non-Drug; Combo Route) route every 3 (three) months.  Dispense: 1 each; Refill: 3  -     insulin lispro-aabc (LYUMJEV U-100 INSULIN) 100 unit/mL; For use in insulin pump, max Total Daily Dose 100 units per day,  Dispense: 100 mL; Refill: 3    2. Hypothyroidism (acquired)  Assessment & Plan:    Currently on levothyroxine 137 mcg daily.  Clinically euthyroid.  TSH has been low with normal free T4.  Discussed keeping her TSH at goal.  Monitor thyroid function test.      Orders:  -     levothyroxine (SYNTHROID) 137 MCG Tab tablet; Take 1 tablet (137 mcg total) by mouth before breakfast.  Dispense: 90 tablet; Refill: 3    3. Insulin pump status  Assessment & Plan:  As above      4. Mixed hyperlipidemia  Assessment & Plan:    Continue statins.  Monitor lipids.        5. Vitamin D deficiency  Assessment & Plan:    On vitamin-D 65599 IU every 2 weeks.  Repeat vitamin-D                Follow-up in 6 months.    Kelley Dobbs MD

## 2024-12-11 ENCOUNTER — HOSPITAL ENCOUNTER (OUTPATIENT)
Dept: RADIOLOGY | Facility: HOSPITAL | Age: 44
Discharge: HOME | End: 2024-12-11
Attending: FAMILY MEDICINE
Payer: COMMERCIAL

## 2024-12-11 DIAGNOSIS — Z12.31 ENCOUNTER FOR SCREENING MAMMOGRAM FOR BREAST CANCER: ICD-10-CM

## 2024-12-11 PROCEDURE — 77063 BREAST TOMOSYNTHESIS BI: CPT | Mod: TC,PO

## 2024-12-11 PROCEDURE — 77063 BREAST TOMOSYNTHESIS BI: CPT | Mod: 26,,, | Performed by: RADIOLOGY

## 2024-12-11 PROCEDURE — 77067 SCR MAMMO BI INCL CAD: CPT | Mod: 26,,, | Performed by: RADIOLOGY

## 2024-12-19 ENCOUNTER — PATIENT MESSAGE (OUTPATIENT)
Dept: PRIMARY CARE CLINIC | Facility: CLINIC | Age: 44
End: 2024-12-19
Payer: COMMERCIAL

## 2024-12-19 ENCOUNTER — PATIENT MESSAGE (OUTPATIENT)
Dept: ENDOCRINOLOGY | Facility: CLINIC | Age: 44
End: 2024-12-19
Payer: COMMERCIAL

## 2024-12-19 DIAGNOSIS — E10.9 CONTROLLED DIABETES MELLITUS TYPE 1 WITHOUT COMPLICATIONS: Primary | ICD-10-CM

## 2024-12-19 DIAGNOSIS — E03.9 HYPOTHYROIDISM (ACQUIRED): ICD-10-CM

## 2024-12-19 RX ORDER — LEVOTHYROXINE SODIUM 137 UG/1
137 TABLET ORAL
Qty: 90 TABLET | Refills: 3 | Status: SHIPPED | OUTPATIENT
Start: 2024-12-19

## 2024-12-19 RX ORDER — ROSUVASTATIN CALCIUM 5 MG/1
5 TABLET, COATED ORAL DAILY
Qty: 90 TABLET | Refills: 3 | Status: SHIPPED | OUTPATIENT
Start: 2024-12-19

## 2024-12-19 RX ORDER — BLOOD-GLUCOSE SENSOR
9 EACH MISCELLANEOUS
Qty: 9 EACH | Refills: 3 | Status: SHIPPED | OUTPATIENT
Start: 2024-12-19 | End: 2024-12-30 | Stop reason: SDUPTHER

## 2024-12-19 RX ORDER — SERTRALINE HYDROCHLORIDE 100 MG/1
100 TABLET, FILM COATED ORAL DAILY
Qty: 90 TABLET | Refills: 3 | Status: SHIPPED | OUTPATIENT
Start: 2024-12-19

## 2024-12-19 RX ORDER — BLOOD-GLUCOSE TRANSMITTER
1 EACH MISCELLANEOUS
Qty: 1 EACH | Refills: 3 | Status: SHIPPED | OUTPATIENT
Start: 2024-12-19 | End: 2025-12-19

## 2024-12-19 NOTE — TELEPHONE ENCOUNTER
No care due was identified.  [facility] Embedded Care Due Messages. Reference number: [id number].   12/19/2024 12:51:11 PM CST

## 2024-12-19 NOTE — TELEPHONE ENCOUNTER
Pt requesting a refill on Crestor, Zoloft, and Synthroid to be sent to [facility] pharmacy    LOV 10/23/2024  annual

## 2024-12-23 RX ORDER — INSULIN LISPRO-AABC 100 [IU]/ML
INJECTION, SOLUTION INTRAVENOUS; SUBCUTANEOUS
Qty: 100 ML | Refills: 3 | Status: SHIPPED | OUTPATIENT
Start: 2024-12-23 | End: 2024-12-30 | Stop reason: SDUPTHER

## 2024-12-30 RX ORDER — INSULIN LISPRO-AABC 100 [IU]/ML
INJECTION, SOLUTION INTRAVENOUS; SUBCUTANEOUS
Qty: 100 ML | Refills: 3 | Status: SHIPPED | OUTPATIENT
Start: 2024-12-30

## 2025-01-24 ENCOUNTER — LAB VISIT (OUTPATIENT)
Dept: LAB | Facility: HOSPITAL | Age: 45
End: 2025-01-24
Attending: INTERNAL MEDICINE
Payer: COMMERCIAL

## 2025-01-24 DIAGNOSIS — E10.9 CONTROLLED DIABETES MELLITUS TYPE 1 WITHOUT COMPLICATIONS: ICD-10-CM

## 2025-01-24 DIAGNOSIS — E55.9 VITAMIN D DEFICIENCY: ICD-10-CM

## 2025-01-24 DIAGNOSIS — E03.9 HYPOTHYROIDISM (ACQUIRED): ICD-10-CM

## 2025-01-24 DIAGNOSIS — D50.8 OTHER IRON DEFICIENCY ANEMIA: ICD-10-CM

## 2025-01-24 LAB
25(OH)D3+25(OH)D2 SERPL-MCNC: 16 NG/ML (ref 30–96)
ALBUMIN SERPL BCP-MCNC: 3.9 G/DL (ref 3.5–5.2)
ALP SERPL-CCNC: 45 U/L (ref 40–150)
ALT SERPL W/O P-5'-P-CCNC: 16 U/L (ref 10–44)
ANION GAP SERPL CALC-SCNC: 7 MMOL/L (ref 8–16)
AST SERPL-CCNC: 20 U/L (ref 10–40)
BILIRUB SERPL-MCNC: 0.4 MG/DL (ref 0.1–1)
BUN SERPL-MCNC: 10 MG/DL (ref 6–20)
CALCIUM SERPL-MCNC: 9.5 MG/DL (ref 8.7–10.5)
CHLORIDE SERPL-SCNC: 106 MMOL/L (ref 95–110)
CO2 SERPL-SCNC: 25 MMOL/L (ref 23–29)
CREAT SERPL-MCNC: 0.8 MG/DL (ref 0.5–1.4)
ERYTHROCYTE [DISTWIDTH] IN BLOOD BY AUTOMATED COUNT: 16.9 % (ref 11.5–14.5)
EST. GFR  (NO RACE VARIABLE): >60 ML/MIN/1.73 M^2
ESTIMATED AVG GLUCOSE: 160 MG/DL (ref 68–131)
FERRITIN SERPL-MCNC: 6 NG/ML (ref 20–300)
GLUCOSE SERPL-MCNC: 169 MG/DL (ref 70–110)
HBA1C MFR BLD: 7.2 % (ref 4–5.6)
HCT VFR BLD AUTO: 37.7 % (ref 37–48.5)
HGB BLD-MCNC: 10.7 G/DL (ref 12–16)
IRON SERPL-MCNC: 22 UG/DL (ref 30–160)
MCH RBC QN AUTO: 23.1 PG (ref 27–31)
MCHC RBC AUTO-ENTMCNC: 28.4 G/DL (ref 32–36)
MCV RBC AUTO: 81 FL (ref 82–98)
PLATELET # BLD AUTO: 229 K/UL (ref 150–450)
PMV BLD AUTO: ABNORMAL FL (ref 9.2–12.9)
POTASSIUM SERPL-SCNC: 5.1 MMOL/L (ref 3.5–5.1)
PROT SERPL-MCNC: 6.9 G/DL (ref 6–8.4)
RBC # BLD AUTO: 4.63 M/UL (ref 4–5.4)
SATURATED IRON: 5 % (ref 20–50)
SODIUM SERPL-SCNC: 138 MMOL/L (ref 136–145)
T4 FREE SERPL-MCNC: 1.07 NG/DL (ref 0.71–1.51)
TOTAL IRON BINDING CAPACITY: 435 UG/DL (ref 250–450)
TRANSFERRIN SERPL-MCNC: 294 MG/DL (ref 200–375)
TSH SERPL DL<=0.005 MIU/L-ACNC: 0.37 UIU/ML (ref 0.4–4)
WBC # BLD AUTO: 4.98 K/UL (ref 3.9–12.7)

## 2025-01-24 PROCEDURE — 83540 ASSAY OF IRON: CPT | Performed by: INTERNAL MEDICINE

## 2025-01-24 PROCEDURE — 80053 COMPREHEN METABOLIC PANEL: CPT | Performed by: INTERNAL MEDICINE

## 2025-01-24 PROCEDURE — 85027 COMPLETE CBC AUTOMATED: CPT | Performed by: INTERNAL MEDICINE

## 2025-01-24 PROCEDURE — 82728 ASSAY OF FERRITIN: CPT | Performed by: INTERNAL MEDICINE

## 2025-01-24 PROCEDURE — 82306 VITAMIN D 25 HYDROXY: CPT | Performed by: INTERNAL MEDICINE

## 2025-01-24 PROCEDURE — 36415 COLL VENOUS BLD VENIPUNCTURE: CPT | Mod: PN | Performed by: INTERNAL MEDICINE

## 2025-01-24 PROCEDURE — 84443 ASSAY THYROID STIM HORMONE: CPT | Performed by: INTERNAL MEDICINE

## 2025-01-24 PROCEDURE — 83036 HEMOGLOBIN GLYCOSYLATED A1C: CPT | Performed by: INTERNAL MEDICINE

## 2025-01-24 PROCEDURE — 84439 ASSAY OF FREE THYROXINE: CPT | Performed by: INTERNAL MEDICINE

## 2025-01-27 ENCOUNTER — PATIENT MESSAGE (OUTPATIENT)
Dept: DIABETES | Facility: CLINIC | Age: 45
End: 2025-01-27
Payer: COMMERCIAL

## 2025-01-27 ENCOUNTER — PATIENT MESSAGE (OUTPATIENT)
Dept: ENDOCRINOLOGY | Facility: CLINIC | Age: 45
End: 2025-01-27
Payer: COMMERCIAL

## 2025-02-05 ENCOUNTER — TELEPHONE (OUTPATIENT)
Dept: ENDOCRINOLOGY | Facility: CLINIC | Age: 45
End: 2025-02-05
Payer: COMMERCIAL

## 2025-02-05 NOTE — TELEPHONE ENCOUNTER
Called [facility], they will fax over forms needed to provider.    ----- Message from Destinee sent at 2/5/2025 10:36 AM CST -----  Regarding: Pharm Auth  Contact: Madeline [phone]  Madeline/sid Bello calling to check status of prescription request DexcomG6  along with clinical notes Pls call [phone]

## 2025-05-14 ENCOUNTER — PATIENT MESSAGE (OUTPATIENT)
Dept: ENDOCRINOLOGY | Facility: CLINIC | Age: 45
End: 2025-05-14
Payer: COMMERCIAL

## 2025-05-15 NOTE — PROGRESS NOTES
ENDOCRINOLOGY CLINIC    The patient location is: Home    Visit type: audiovisual    Face to Face time with patient: 11 mins    30 minutes of total time spent on the encounter, which includes face to face time and non-face to face time preparing to see the patient (eg, review of tests), Obtaining and/or reviewing separately obtained history, Documenting clinical information in the electronic or other health record, Independently interpreting results (not separately reported) and communicating results to the patient/family/caregiver, or Care coordination (not separately reported).     Each patient to whom he or she provides medical services by telemedicine is:  (1) informed of the relationship between the physician and patient and the respective role of any other health care provider with respect to management of the patient; and (2) notified that he or she may decline to receive medical services by telemedicine and may withdraw from such care at any time.    Notes:      Subjective:        Chief Complaint:  Type 1 diabetes    HPI:   Thao Ybarra is a 44 y.o. female who presents for follow-up of type 1 diabetes. Patient was seen in the clinic on 12/09/2024.      Diabetes Hx:  Diagnosed w/ DM:   Complications:   Retinopathy: Denies   Last eye exam: : 06/13/2024  Neuropathy: Denies  Nephropathy: Denies  Cardiovascular: Denies  DKA/HHS: Denies    Severe Hypoglycemia: Denies  Hypoglycemia unawareness: Denies  Glucagon: Has glucagon injection  Hypoglycemic episodes: 1-2 times a week, carries skittles.     Current meds:     Pump: Tslim Control IQ with Dexcom G6       Pump Settings     Insulin pump data reviewed from 05/08/2025 05/21/2025.                        Denies any significant hypoglycemia recently.    Uses [employer] for insulin pump supply.   Uses [email or web address] for her Dexcom.       Compliance with meds: Yes    Diet/Exercise:   Takes 3 big snacks a day.   Takes bolus for snacks - 1 units, for meal  - 3 units.     Taking  adequate water  Quit diet coke  Does yoga 3 times/week, swimming, boxing training with .     Diabetes education:  2 years back.    Last A1c:   Lab Results   Component Value Date    HGBA1C 7.2 (H) 01/24/2025    HGBA1C 6.1 (H) 06/05/2024    HGBA1C 5.8 (H) 07/06/2023     Microalbumin:   Lab Results   Component Value Date    LABMICR 6.0 06/05/2024    CREATRANDUR 67.0 06/05/2024    MICALBCREAT 9.0 06/05/2024    MICALBCREAT 3.9 07/06/2023     Lab Results   Component Value Date    EGFRNORACEVR >60.0 01/24/2025    CREATININE 0.8 01/24/2025     Lipids:   Lab Results   Component Value Date    CHOL 151 06/05/2024    TRIG 52 06/05/2024    HDL 67 06/05/2024    LDLCALC 73.6 06/05/2024    CHOLHDL 44.4 06/05/2024     TSH:  Lab Results   Component Value Date    TSH 0.367 (L) 01/24/2025     Lab Results   Component Value Date    HGB 10.7 (L) 01/24/2025        Aspirin: Yes  Statins: Yes  ACEI/ARB: No    HTN:  Denies    History recurrent UTI/yeast infections: Denies    Polyuria/Polydipsia: Denies      FHx of DM: Denies        Hypothyroidism    S/p thyroidectomy in her 20s for goiter and nodules, benign  Current medications: Levothyroxine 137 mcg daily.    TSH has been suppressed with normal free T4.    Cardiovascular disorder:  Denies    Thyroid symptoms:  Clinically euthyroid    Family history of thyroid disease: Mother had hypothyroidism.      Lab Results   Component Value Date    TSH 0.367 (L) 01/24/2025    TSH 0.050 (L) 06/05/2024    TSH 0.108 (L) 07/06/2023    TSH 2.391 02/22/2022    TSH 1.833 03/22/2021    FREET4 1.07 01/24/2025    FREET4 0.91 06/05/2024    FREET4 1.13 07/06/2023    FREET4 1.09 03/22/2021    FREET4 1.48 01/09/2019         Vitamin D deficiency    Vitamin-D 53796 IU weekly, reports compliance recently.  Previously has been forgetting to take her supplements.    Lab Results   Component Value Date    [id number] 16 (L) 01/24/2025    ITEOUJBW16EI 17 (L) 06/05/2024      ROS: see HPI     Objective:     Physical  Exam     There were no vitals taken for this visit.    Wt Readings from Last 3 Encounters:   10/23/24 89.6 kg (197 lb 8.5 oz)   06/17/24 84.1 kg (185 lb 6.5 oz)   06/04/24 85.5 kg (188 lb 7.9 oz)       Constitutional:  Pleasant,  in no acute distress.   HENT:   Eyes:    No scleral icterus.   Respiratory:   Effort normal   Neurological:  Normal speech      LABORATORY REVIEW:  See HPI for other labs reviewed today      Chemistry        Component Value Date/Time     01/24/2025 1130    K 5.1 01/24/2025 1130     01/24/2025 1130    CO2 25 01/24/2025 1130    BUN 10 01/24/2025 1130    CREATININE 0.8 01/24/2025 1130     (H) 01/24/2025 1130        Component Value Date/Time    CALCIUM 9.5 01/24/2025 1130    ALKPHOS 45 01/24/2025 1130    AST 20 01/24/2025 1130    ALT 16 01/24/2025 1130    BILITOT 0.4 01/24/2025 1130    ESTGFRAFRICA >60.0 06/14/2022 0846    EGFRNONAA >60.0 06/14/2022 0846          Lab Results   Component Value Date    HGBA1C 7.2 (H) 01/24/2025    HGBA1C 6.1 (H) 06/05/2024    HGBA1C 5.8 (H) 07/06/2023     Other labs reviewed today in HPI    Assessment/Plan:     1. Controlled diabetes mellitus type 1 without complications  Assessment & Plan:      Last A1c was 7.2.  Repeat A1c.  Insulin pump data for the last 2 weeks showed average blood sugar of 149, 72% in range and 4% below range.  Prior 2 weeks she had 2% below range.  Denies significant hypoglycemic episodes recently.  Using Control IQ.    Continue current insulin pump settings.      Continue good diet and lifestyle modifications for diabetes management  Carry glucagon, glucose tablets or snacks at all times.     Complications:  Follow up for regular diabetes eye exam  Daily self examination of feet.  Microalbumin: Monitor.     Lab Results   Component Value Date    HGBA1C 7.2 (H) 01/24/2025              2. Hypothyroidism (acquired)  Assessment & Plan:    Currently on levothyroxine 137 mcg daily.  Clinically euthyroid.  TSH has been low with  normal free T4.  Decrease levothyroxine to 137 mcg tablet daily for 6 days and take half tablet on Sunday.  Discussed keeping her TSH at goal.  Monitor thyroid function test.        3. Insulin pump status  Assessment & Plan:  As above      4. Mixed hyperlipidemia  Assessment & Plan:    Continue statins.  Monitor lipids.        5. Vitamin D deficiency  Assessment & Plan:    On vitamin-D 00690 IU once a week.  Repeat vitamin-D                  Follow-up in 6 months.    Kelley Dobbs MD

## 2025-05-21 ENCOUNTER — OFFICE VISIT (OUTPATIENT)
Dept: ENDOCRINOLOGY | Facility: CLINIC | Age: 45
End: 2025-05-21
Payer: COMMERCIAL

## 2025-05-21 DIAGNOSIS — Z96.41 INSULIN PUMP STATUS: ICD-10-CM

## 2025-05-21 DIAGNOSIS — E78.2 MIXED HYPERLIPIDEMIA: ICD-10-CM

## 2025-05-21 DIAGNOSIS — E10.9 CONTROLLED DIABETES MELLITUS TYPE 1 WITHOUT COMPLICATIONS: Primary | ICD-10-CM

## 2025-05-21 DIAGNOSIS — E03.9 HYPOTHYROIDISM (ACQUIRED): Primary | ICD-10-CM

## 2025-05-21 DIAGNOSIS — E10.9 CONTROLLED DIABETES MELLITUS TYPE 1 WITHOUT COMPLICATIONS: ICD-10-CM

## 2025-05-21 DIAGNOSIS — E03.9 HYPOTHYROIDISM (ACQUIRED): ICD-10-CM

## 2025-05-21 DIAGNOSIS — E55.9 VITAMIN D DEFICIENCY: ICD-10-CM

## 2025-05-21 RX ORDER — GLUCAGON INJECTION, SOLUTION 0.5 MG/.1ML
1 INJECTION, SOLUTION SUBCUTANEOUS
Qty: 0.2 ML | Refills: 3 | Status: SHIPPED | OUTPATIENT
Start: 2025-05-21

## 2025-05-21 RX ORDER — INSULIN LISPRO-AABC 100 [IU]/ML
INJECTION, SOLUTION INTRAVENOUS; SUBCUTANEOUS
Qty: 100 ML | Refills: 3 | Status: SHIPPED | OUTPATIENT
Start: 2025-05-21

## 2025-05-21 RX ORDER — LEVOTHYROXINE SODIUM 137 UG/1
TABLET ORAL
Qty: 90 TABLET | Refills: 3 | Status: SHIPPED | OUTPATIENT
Start: 2025-05-21

## 2025-05-21 RX ORDER — ROSUVASTATIN CALCIUM 5 MG/1
5 TABLET, COATED ORAL DAILY
Qty: 90 TABLET | Refills: 3 | Status: SHIPPED | OUTPATIENT
Start: 2025-05-21

## 2025-05-21 NOTE — ASSESSMENT & PLAN NOTE
Last A1c was 7.2.  Repeat A1c.  Insulin pump data for the last 2 weeks showed average blood sugar of 149, 72% in range and 4% below range.  Prior 2 weeks she had 2% below range.  Denies significant hypoglycemic episodes recently.  Using Control IQ.    Continue current insulin pump settings.      Continue good diet and lifestyle modifications for diabetes management  Carry glucagon, glucose tablets or snacks at all times.     Complications:  Follow up for regular diabetes eye exam  Daily self examination of feet.  Microalbumin: Monitor.     Lab Results   Component Value Date    HGBA1C 7.2 (H) 01/24/2025

## 2025-05-21 NOTE — PATIENT INSTRUCTIONS
Please get fasting labs done and will contact you with the results.    Continue current insulin pump settings.    Call if blood sugars are frequently less than 70 or above 200.    Call if you need prescriptions for medications.  Carry glucagon, glucose tablets or snacks with you at all times.     Decrease levothyroxine 137 mcg tablets to 1 tablet daily for 6 days and take only half tablet on Sunday.    Follow-up in 6 months.

## 2025-05-21 NOTE — ASSESSMENT & PLAN NOTE
Currently on levothyroxine 137 mcg daily.  Clinically euthyroid.  TSH has been low with normal free T4.  Decrease levothyroxine to 137 mcg tablet daily for 6 days and take half tablet on Sunday.  Discussed keeping her TSH at goal.  Monitor thyroid function test.

## 2025-06-18 DIAGNOSIS — E11.9 TYPE 2 DIABETES MELLITUS WITHOUT COMPLICATION, UNSPECIFIED WHETHER LONG TERM INSULIN USE: ICD-10-CM

## 2025-07-24 DIAGNOSIS — E55.9 VITAMIN D DEFICIENCY: ICD-10-CM

## 2025-07-24 RX ORDER — ERGOCALCIFEROL 1.25 MG/1
50000 CAPSULE ORAL
Qty: 12 CAPSULE | Refills: 1 | Status: SHIPPED | OUTPATIENT
Start: 2025-07-24

## 2025-07-24 NOTE — TELEPHONE ENCOUNTER
Copied from CRM #[id number]. Topic: Medications - Medication Refill  >> Jul 24, 2025  3:25 PM Madison wrote:  .Who Called: Tj     Refill or New Rx:Refill  RX Name and Strength:ergocalciferol (ERGOCALCIFEROL) 50,000 unit Cap  How is the patient currently taking it? (ex. 1XDay):in chart   Is this a 30 day or 90 day RX:in chart   Local or Mail Order:local   List of preferred pharmacies on file (remove unneeded):   [id number] - [address] AT [facility]  [address] [facility] [address]  Phone: [phone] Fax: [phone]  Ordering Provider:this one

## (undated) DEVICE — DRESSING XEROFORM NONADH 1X8IN

## (undated) DEVICE — GLOVE SENSICARE PI GRN 8.5

## (undated) DEVICE — COVER LIGHT HANDLE 80/CA

## (undated) DEVICE — DRAPE STERI INSTRUMENT 1018

## (undated) DEVICE — SUT 3-0 ETHILON 18 FS-1

## (undated) DEVICE — TOURNIQUET SB QC DP 34X4IN

## (undated) DEVICE — BANDAGE MATRIX HK LOOP 6IN 5YD

## (undated) DEVICE — WRAP KNEE ACCU THERM GEL PACK

## (undated) DEVICE — SOL NACL IRR 1000ML BTL

## (undated) DEVICE — BANDAGE ACE ELASTIC 6"

## (undated) DEVICE — TUBE SET INFLOW/OUTFLOW

## (undated) DEVICE — PROBE ARTHSCP EDGE ENERGY 50

## (undated) DEVICE — STRIP MEDI WND CLSR 1/2X4IN

## (undated) DEVICE — GOWN ECLIPSE REINF LV4 XLNG XL

## (undated) DEVICE — PAD ABDOMINAL STERILE 8X10IN

## (undated) DEVICE — ADHESIVE MASTISOL VIAL 48/BX

## (undated) DEVICE — BLADE SHAVER LANZA 4.2X13CM

## (undated) DEVICE — GLOVE BIOGEL PI MICRO INDIC 7

## (undated) DEVICE — Device

## (undated) DEVICE — SPONGE COTTON TRAY 4X4IN

## (undated) DEVICE — COVER CAMERA OPERATING ROOM

## (undated) DEVICE — GLOVE SENSICARE PI SURG 8

## (undated) DEVICE — SOL NACL IRR 3000ML

## (undated) DEVICE — DRAPE ARTHSCP FLD CTRL POUCH

## (undated) DEVICE — DRAPE STERI U-SHAPED 47X51IN

## (undated) DEVICE — APPLICATOR CHLORAPREP ORN 26ML